# Patient Record
Sex: FEMALE | Race: WHITE | Employment: FULL TIME | URBAN - METROPOLITAN AREA
[De-identification: names, ages, dates, MRNs, and addresses within clinical notes are randomized per-mention and may not be internally consistent; named-entity substitution may affect disease eponyms.]

---

## 2017-01-10 DIAGNOSIS — I10 ESSENTIAL HYPERTENSION: ICD-10-CM

## 2017-01-10 RX ORDER — LISINOPRIL AND HYDROCHLOROTHIAZIDE 12.5; 1 MG/1; MG/1
1 TABLET ORAL DAILY
Qty: 90 TABLET | Refills: 1 | OUTPATIENT
Start: 2017-01-10

## 2017-03-21 DIAGNOSIS — E78.1 HYPERTRIGLYCERIDEMIA: ICD-10-CM

## 2017-03-21 RX ORDER — FENOFIBRATE 145 MG/1
145 TABLET, COATED ORAL DAILY
Qty: 90 TABLET | Refills: 1 | OUTPATIENT
Start: 2017-03-21

## 2017-04-03 ENCOUNTER — TELEPHONE (OUTPATIENT)
Dept: INTERNAL MEDICINE CLINIC | Age: 47
End: 2017-04-03

## 2017-04-04 ENCOUNTER — OFFICE VISIT (OUTPATIENT)
Dept: INTERNAL MEDICINE CLINIC | Age: 47
End: 2017-04-04

## 2017-04-04 VITALS
SYSTOLIC BLOOD PRESSURE: 110 MMHG | RESPIRATION RATE: 12 BRPM | WEIGHT: 230.6 LBS | BODY MASS INDEX: 42.44 KG/M2 | DIASTOLIC BLOOD PRESSURE: 72 MMHG | TEMPERATURE: 98.1 F | HEART RATE: 75 BPM | OXYGEN SATURATION: 99 % | HEIGHT: 62 IN

## 2017-04-04 DIAGNOSIS — I10 ESSENTIAL HYPERTENSION: Primary | ICD-10-CM

## 2017-04-04 DIAGNOSIS — E78.1 HYPERTRIGLYCERIDEMIA: ICD-10-CM

## 2017-04-04 DIAGNOSIS — Z00.00 ANNUAL PHYSICAL EXAM: ICD-10-CM

## 2017-04-04 DIAGNOSIS — H93.8X2 EAR FULLNESS, LEFT: ICD-10-CM

## 2017-04-04 DIAGNOSIS — R73.03 PRE-DIABETES: ICD-10-CM

## 2017-04-04 DIAGNOSIS — K21.9 GASTROESOPHAGEAL REFLUX DISEASE, ESOPHAGITIS PRESENCE NOT SPECIFIED: ICD-10-CM

## 2017-04-04 DIAGNOSIS — H61.21 EXCESSIVE CERUMEN IN RIGHT EAR CANAL: ICD-10-CM

## 2017-04-04 LAB
CHOLESTEROL, TOTAL: 166 MG/DL (ref 0–199)
HDLC SERPL-MCNC: 28 MG/DL (ref 40–60)
LDL CHOLESTEROL CALCULATED: 97 MG/DL
TRIGL SERPL-MCNC: 204 MG/DL (ref 0–150)
TSH SERPL DL<=0.05 MIU/L-ACNC: 2.19 UIU/ML (ref 0.27–4.2)
VLDLC SERPL CALC-MCNC: 41 MG/DL

## 2017-04-04 PROCEDURE — 99214 OFFICE O/P EST MOD 30 MIN: CPT | Performed by: INTERNAL MEDICINE

## 2017-04-06 ASSESSMENT — ENCOUNTER SYMPTOMS
COUGH: 0
CHEST TIGHTNESS: 0
WHEEZING: 0
ABDOMINAL PAIN: 0
SHORTNESS OF BREATH: 0
SINUS PRESSURE: 0
RHINORRHEA: 0
CONSTIPATION: 0
DIARRHEA: 0
NAUSEA: 0
VOMITING: 0
SORE THROAT: 0
BLOOD IN STOOL: 0

## 2017-07-02 DIAGNOSIS — I10 ESSENTIAL HYPERTENSION: ICD-10-CM

## 2017-07-03 RX ORDER — LISINOPRIL AND HYDROCHLOROTHIAZIDE 12.5; 1 MG/1; MG/1
TABLET ORAL
Qty: 90 TABLET | Refills: 0 | Status: SHIPPED | OUTPATIENT
Start: 2017-07-03 | End: 2017-10-09 | Stop reason: SDUPTHER

## 2017-09-26 DIAGNOSIS — E78.1 HYPERTRIGLYCERIDEMIA: ICD-10-CM

## 2017-09-26 RX ORDER — FENOFIBRATE 145 MG/1
TABLET, COATED ORAL
Qty: 90 TABLET | Refills: 0 | Status: SHIPPED | OUTPATIENT
Start: 2017-09-26 | End: 2017-11-09 | Stop reason: SDUPTHER

## 2017-10-09 DIAGNOSIS — I10 ESSENTIAL HYPERTENSION: ICD-10-CM

## 2017-10-09 RX ORDER — LISINOPRIL AND HYDROCHLOROTHIAZIDE 12.5; 1 MG/1; MG/1
TABLET ORAL
Qty: 90 TABLET | Refills: 0 | Status: SHIPPED | OUTPATIENT
Start: 2017-10-09 | End: 2017-11-09 | Stop reason: SDUPTHER

## 2017-11-09 ENCOUNTER — OFFICE VISIT (OUTPATIENT)
Dept: INTERNAL MEDICINE CLINIC | Age: 47
End: 2017-11-09

## 2017-11-09 VITALS
DIASTOLIC BLOOD PRESSURE: 74 MMHG | BODY MASS INDEX: 40.3 KG/M2 | TEMPERATURE: 98 F | HEIGHT: 62 IN | WEIGHT: 219 LBS | HEART RATE: 86 BPM | SYSTOLIC BLOOD PRESSURE: 114 MMHG | RESPIRATION RATE: 12 BRPM | OXYGEN SATURATION: 98 %

## 2017-11-09 DIAGNOSIS — R73.03 PREDIABETES: ICD-10-CM

## 2017-11-09 DIAGNOSIS — F41.9 ANXIETY: ICD-10-CM

## 2017-11-09 DIAGNOSIS — E78.2 MIXED HYPERLIPIDEMIA: ICD-10-CM

## 2017-11-09 DIAGNOSIS — R82.998 LEUKOCYTES IN URINE: ICD-10-CM

## 2017-11-09 DIAGNOSIS — Z00.00 ANNUAL PHYSICAL EXAM: Primary | ICD-10-CM

## 2017-11-09 DIAGNOSIS — Z12.31 ENCOUNTER FOR SCREENING MAMMOGRAM FOR BREAST CANCER: ICD-10-CM

## 2017-11-09 DIAGNOSIS — Z23 NEED FOR INFLUENZA VACCINATION: ICD-10-CM

## 2017-11-09 DIAGNOSIS — I10 ESSENTIAL HYPERTENSION: ICD-10-CM

## 2017-11-09 LAB
BILIRUBIN, POC: NORMAL
BLOOD URINE, POC: NORMAL
CLARITY, POC: NORMAL
COLOR, POC: NORMAL
GLUCOSE URINE, POC: NORMAL
KETONES, POC: NORMAL
LEUKOCYTE EST, POC: NORMAL
NITRITE, POC: NORMAL
PH, POC: 7
PROTEIN, POC: NORMAL
SPECIFIC GRAVITY, POC: 1.01
UROBILINOGEN, POC: 0.2

## 2017-11-09 PROCEDURE — 90471 IMMUNIZATION ADMIN: CPT | Performed by: INTERNAL MEDICINE

## 2017-11-09 PROCEDURE — 99396 PREV VISIT EST AGE 40-64: CPT | Performed by: INTERNAL MEDICINE

## 2017-11-09 PROCEDURE — 90630 INFLUENZA, QUADV, 18-64 YRS, ID, PF, MICRO INJ, 0.1ML (FLUZONE QUADV, PF): CPT | Performed by: INTERNAL MEDICINE

## 2017-11-09 PROCEDURE — 81002 URINALYSIS NONAUTO W/O SCOPE: CPT | Performed by: INTERNAL MEDICINE

## 2017-11-09 RX ORDER — FENOFIBRATE 145 MG/1
TABLET, COATED ORAL
Qty: 90 TABLET | Refills: 1 | Status: SHIPPED | OUTPATIENT
Start: 2017-11-09 | End: 2018-05-10 | Stop reason: SDUPTHER

## 2017-11-09 RX ORDER — SERTRALINE HYDROCHLORIDE 25 MG/1
25 TABLET, FILM COATED ORAL DAILY
Qty: 30 TABLET | Refills: 1 | Status: SHIPPED | OUTPATIENT
Start: 2017-11-09 | End: 2017-12-11 | Stop reason: SDUPTHER

## 2017-11-09 RX ORDER — LISINOPRIL AND HYDROCHLOROTHIAZIDE 12.5; 1 MG/1; MG/1
TABLET ORAL
Qty: 90 TABLET | Refills: 1 | Status: SHIPPED | OUTPATIENT
Start: 2017-11-09 | End: 2018-05-10 | Stop reason: SDUPTHER

## 2017-11-09 ASSESSMENT — ENCOUNTER SYMPTOMS
DIARRHEA: 0
NAUSEA: 0
VOICE CHANGE: 0
PHOTOPHOBIA: 0
BACK PAIN: 1
VOMITING: 0
APNEA: 0
WHEEZING: 0
EYE PAIN: 0
CHEST TIGHTNESS: 0
SINUS PRESSURE: 0
CONSTIPATION: 0
SORE THROAT: 0
EYE ITCHING: 0
RECTAL PAIN: 0
FACIAL SWELLING: 0
COUGH: 0
RHINORRHEA: 0
ABDOMINAL DISTENTION: 0
TROUBLE SWALLOWING: 0
SHORTNESS OF BREATH: 0
ABDOMINAL PAIN: 0
ANAL BLEEDING: 0
EYE REDNESS: 0
BLOOD IN STOOL: 0
EYE DISCHARGE: 0
CHOKING: 1

## 2017-11-09 ASSESSMENT — PATIENT HEALTH QUESTIONNAIRE - PHQ9
2. FEELING DOWN, DEPRESSED OR HOPELESS: 0
SUM OF ALL RESPONSES TO PHQ9 QUESTIONS 1 & 2: 0
1. LITTLE INTEREST OR PLEASURE IN DOING THINGS: 0
SUM OF ALL RESPONSES TO PHQ QUESTIONS 1-9: 0

## 2017-11-09 NOTE — PROGRESS NOTES
Mackenzie Dupree   YOB: 1970    Date of Visit:  11/9/2017    Chief Complaint   Patient presents with    Annual Exam       HPI  Pt presents for annual physical exam.    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 Negative for appetite change, chills, diaphoresis, fatigue, fever and unexpected weight change. HENT: Negative for congestion, ear discharge, ear pain, facial swelling, hearing loss, mouth sores, nosebleeds, postnasal drip, rhinorrhea, sinus pressure, sneezing, sore throat, tinnitus, trouble swallowing and voice change. Eyes: Negative for photophobia, pain, discharge, redness, itching and visual disturbance. Respiratory: Positive for choking (with drinking fluids couple of times in years). Negative for apnea, cough, chest tightness, shortness of breath and wheezing. Cardiovascular: Negative for chest pain, palpitations and leg swelling. Gastrointestinal: Negative for abdominal distention, abdominal pain, anal bleeding, blood in stool, constipation, diarrhea, nausea, rectal pain and vomiting. Endocrine: Negative for cold intolerance and heat intolerance.    Genitourinary: Negative for decreased urine volume, difficulty urinating, dysuria, flank pain, frequency, genital sores, hematuria, pelvic pain, urgency, vaginal bleeding, vaginal discharge and vaginal pain. LMP 2013 s/p hysterectomy   Musculoskeletal: Positive for arthralgias (patient is s/p a Podiatrist for left foot pain) and back pain (pt states she sees a Chiropractor). Negative for gait problem, joint swelling, myalgias, neck pain and neck stiffness. Skin: Negative for rash and wound. Neurological: Positive for dizziness (episode 1 month or so ago for less than 30 seconds). Negative for tremors, seizures, syncope, facial asymmetry, speech difficulty, weakness, light-headedness, numbness and headaches. Hematological: Negative for adenopathy. Does not bruise/bleed easily. Psychiatric/Behavioral: Negative for decreased concentration, dysphoric mood and sleep disturbance. The patient is not nervous/anxious. All other systems reviewed and are negative. Past Medical History:   Diagnosis Date    Endometriosis     Hypertension        Past Surgical History:   Procedure Laterality Date    [facility]  2004   [facility]    COLONOSCOPY  03/18/2015    [facility]OME[facility]    FOOT SURGERY Left 2013    left foot recontstructon    HYSTERECTOMY  2013    complete - vaginal hyst    TUBAL LIGATION  2013    with ablation       Outpatient Prescriptions Marked as Taking for the 11/9/17 encounter (Office Visit) with Atilano Closs, MD   Medication Sig Dispense Refill    lisinopril-hydrochlorothiazide (PRINZIDE;ZESTORETIC) 10-12.5 MG per tablet TAKE 1 TABLET BY MOUTH DAILY 90 tablet 0    fenofibrate (TRICOR) 145 MG tablet TAKE 1 TABLET BY MOUTH DAILY 90 tablet 0    Multiple Vitamins-Minerals (THERAPEUTIC MULTIVITAMIN-MINERALS) tablet Take 1 tablet by mouth daily      ibuprofen (ADVIL;MOTRIN) 200 MG tablet Take 400 mg by mouth every 6 hours as needed for Pain      omeprazole (PRILOSEC) 20 MG capsule Take 1 capsule by mouth Daily 90 capsule 1    fluticasone (FLONASE) 50 MCG/ACT nasal spray 2 sprays by Nasal route daily.  1 Bottle 3         Allergies   Allergen Reactions    Sulfa Antibiotics Rash       Social History   Substance Use Topics    Smoking status: Never Smoker    Smokeless tobacco: Never Used    Alcohol use 0.5 oz/week     1 Standard drinks or equivalent per week      Comment: social       Family History   Problem Relation Age of Onset    Diabetes Mother     High Blood Pressure Mother     Stroke Mother     High Blood Pressure Father     Cancer Maternal Aunt 58     breast    Cancer Maternal Grandmother 72     colon cancer    Cancer Paternal Grandmother      lung       Immunization History   Administered Date(s) Administered    Hepatitis B (Recombivax HB) 07/01/2015, 08/12/2015    Influenza, Intradermal, Quadrivalent, Preservative Free 10/27/2016, 11/09/2017    Tdap (Boostrix, Adacel) 09/24/2012       Vitals:    11/09/17 1656   BP: 114/74   Site: Right Arm   Position: Sitting   Cuff Size: Large Adult   Pulse: 86   Resp: 12   Temp: 98 °F (36.7 °C)   TempSrc: Oral   SpO2: 98%   Weight: 219 lb (99.3 kg)   Height: 5' 1.5\" (1.562 m)     Body mass index is 40.71 kg/m². Physical Exam   Constitutional: She is oriented to person, place, and time. She appears well-developed and well-nourished. No distress. Obese middle aged WF   HENT:   Head: Normocephalic and atraumatic. Right Ear: Hearing, tympanic membrane and ear canal normal.   Left Ear: Hearing, tympanic membrane and ear canal normal.   Nose: Nose normal.   Mouth/Throat: Uvula is midline, oropharynx is clear and moist and mucous membranes are normal.   Eyes: Conjunctivae, EOM and lids are normal. Pupils are equal, round, and reactive to light. Neck: Neck supple. Carotid bruit is not present. No thyromegaly present. Cardiovascular: Normal rate, regular rhythm, S1 normal, S2 normal, normal heart sounds and intact distal pulses. Exam reveals no gallop and no friction rub. No murmur heard.   Pulmonary/Chest: Effort normal and breath sounds normal. No exhibits no tenderness. Left lower leg: She exhibits no tenderness. Right foot: There is no tenderness and no swelling. Left foot: There is no tenderness and no swelling. Lymphadenopathy:        Head (right side): No submandibular adenopathy present. Head (left side): No submandibular adenopathy present. She has no cervical adenopathy. She has no axillary adenopathy. Neurological: She is alert and oriented to person, place, and time. She has normal strength and normal reflexes. No cranial nerve deficit. Gait normal.   Skin: Skin is warm, dry and intact. No bruising, no lesion and no rash noted. No erythema. Psychiatric: She has a normal mood and affect. Her speech is normal.   Nursing note reviewed. Assessment/Plan     1. Annual physical exam    - POCT Urinalysis no Micro  - Lipid Panel; Future  - CBC Auto Differential; Future  - Comprehensive Metabolic Panel; Future  - TSH without Reflex; Future    2. Essential hypertension    - lisinopril-hydrochlorothiazide (PRINZIDE;ZESTORETIC) 10-12.5 MG per tablet; TAKE 1 TABLET BY MOUTH DAILY  Dispense: 90 tablet; Refill: 1    3. Mixed hyperlipidemia    - Lipid Panel; Future  - fenofibrate (TRICOR) 145 MG tablet; TAKE 1 TABLET BY MOUTH DAILY  Dispense: 90 tablet; Refill: 1    4. Prediabetes    - Hemoglobin A1C; Future    5. Anxiety    - sertraline (ZOLOFT) 25 MG tablet; Take 1 tablet by mouth daily  Dispense: 30 tablet; Refill: 1    6. Need for influenza vaccination    - INFLUENZA, QUADV, 18-64 YRS, ID, PF, MICRO INJ, 0.1ML (FLUZONE QUADV, PF)    7. Leukocytes in urine    - Urine Culture; Future  - Urine Culture    8. Encounter for screening mammogram for breast cancer    - [facility] Screening Bilateral; Future      Discussed medications with patient, who voiced understanding of their use and indications. All questions answered. Return in about 4 weeks (around 12/7/2017) for anxiety.

## 2017-11-09 NOTE — PROGRESS NOTES
Vaccine Information Sheet, \"Influenza - Inactivated\"  given to Adis Prince, or parent/legal guardian of  Adis Prince and verbalized understanding. Patient responses:    Have you ever had a reaction to a flu vaccine? No  Are you able to eat eggs without adverse effects? Yes  Do you have any current illness? No  Have you ever had Guillian Boone Syndrome? No    Flu vaccine given per order. Please see immunization tab.

## 2017-11-09 NOTE — PATIENT INSTRUCTIONS
been estimated at 1 or 2 additional cases per million people vaccinated. This is much lower than the risk of severe complications from flu, which can be prevented by flu vaccine. · [facility] children who get the flu shot along with pneumococcal vaccine (PCV13) and/or DTaP vaccine at the same time might be slightly more likely to have a seizure caused by fever. Ask your doctor for more information. Tell your doctor if a child who is getting flu vaccine has ever had a seizure  Problems that could happen after any injected vaccine:  · People sometimes faint after a medical procedure, including vaccination. Sitting or lying down for about 15 minutes can help prevent fainting, and injuries caused by a fall. Tell your doctor if you feel dizzy, or have vision changes or ringing in the ears. · Some people get severe pain in the shoulder and have difficulty moving the arm where a shot was given. This happens very rarely. · Any medication can cause a severe allergic reaction. Such reactions from a vaccine are very rare, estimated at about 1 in a million doses, and would happen within a few minutes to a few hours after the vaccination. As with any medicine, there is a very remote chance of a vaccine causing a serious injury or death. The safety of vaccines is always being monitored. For more information, visit: www.cdc.gov/vaccinesafety/. What if there is a serious reaction? What should I look for? · Look for anything that concerns you, such as signs of a severe allergic reaction, very high fever, or unusual behavior. Signs of a severe allergic reaction can include hives, swelling of the face and throat, difficulty breathing, a fast heartbeat, dizziness, and weakness  usually within a few minutes to a few hours after the vaccination. What should I do?   · If you think it is a severe allergic reaction or other emergency that can't wait, call 9-1-1 and get the person to the nearest hospital. Otherwise, call your symptoms such as sweaty palms and a nervous feeling. In an anxiety disorder, the symptoms are far more severe. Constant worry, muscle tension, trouble sleeping, nausea and diarrhea, and other symptoms can make normal daily activities difficult or impossible. These symptoms may occur for no reason, and they can affect your work, school, or social life. Medicines, counseling, and self-care can all help. Follow-up care is a key part of your treatment and safety. Be sure to make and go to all appointments, and call your doctor if you are having problems. It's also a good idea to know your test results and keep a list of the medicines you take. How can you care for yourself at home? · Take medicines exactly as directed. Call your doctor if you think you are having a problem with your medicine. · Go to your counseling sessions and follow-up appointments. · Recognize and accept your anxiety. Then, when you are in a situation that makes you anxious, say to yourself, \"This is not an emergency. I feel uncomfortable, but I am not in danger. I can keep going even if I feel anxious. \"  · Be kind to your body:  ¨ Relieve tension with exercise or a massage. ¨ Get enough rest.  ¨ Avoid alcohol, caffeine, nicotine, and illegal drugs. They can increase your anxiety level and cause sleep problems. ¨ Learn and do relaxation techniques. See below for more about these techniques. · Engage your mind. Get out and do something you enjoy. Go to a funny movie, or take a walk or hike. Plan your day. Having too much or too little to do can make you anxious. · Keep a record of your symptoms. Discuss your fears with a good friend or family member, or join a support group for people with similar problems. Talking to others sometimes relieves stress. · Get involved in social groups, or volunteer to help others. Being alone sometimes makes things seem worse than they are.   · Get at least 30 minutes of exercise on most days of the week to relieve stress. Walking is a good choice. You also may want to do other activities, such as running, swimming, cycling, or playing tennis or team sports. Relaxation techniques  Do relaxation exercises 10 to 20 minutes a day. You can play soothing, relaxing music while you do them, if you wish. · Tell others in your house that you are going to do your relaxation exercises. Ask them not to disturb you. · Find a comfortable place, away from all distractions and noise. · Lie down on your back, or sit with your back straight. · Focus on your breathing. Make it slow and steady. · Breathe in through your nose. Breathe out through either your nose or mouth. · Breathe deeply, filling up the area between your navel and your rib cage. Breathe so that your belly goes up and down. · Do not hold your breath. · Breathe like this for 5 to 10 minutes. Notice the feeling of calmness throughout your whole body. As you continue to breathe slowly and deeply, relax by doing the following for another 5 to 10 minutes:  · Tighten and relax each muscle group in your body. You can begin at your toes and work your way up to your head. · Imagine your muscle groups relaxing and becoming heavy. · Empty your mind of all thoughts. · Let yourself relax more and more deeply. · Become aware of the state of calmness that surrounds you. · When your relaxation time is over, you can bring yourself back to alertness by moving your fingers and toes and then your hands and feet and then stretching and moving your entire body. Sometimes people fall asleep during relaxation, but they usually wake up shortly afterward. · Always give yourself time to return to full alertness before you drive a car or do anything that might cause an accident if you are not fully alert. Never play a relaxation tape while you drive a car. When should you call for help? Call 911 anytime you think you may need emergency care.  For example, call if:  · You feel you cannot stop from hurting yourself or someone else. Keep the numbers for these national suicide hotlines: 8-936-440-TALK (7-596.877.9849) and 5-408-NRTGDID (0-570.989.1315). If you or someone you know talks about suicide or feeling hopeless, get help right away. Watch closely for changes in your health, and be sure to contact your doctor if:  · You have anxiety or fear that affects your life. · You have symptoms of anxiety that are new or different from those you had before. Where can you learn more? Go to https://One World Virtualpepiceweb.MobilePaks. org and sign in to your [employer] account. Enter P754 in the [employer] box to learn more about \"Anxiety Disorder: Care Instructions. \"     If you do not have an account, please click on the \"Sign Up Now\" link. Current as of: July 26, 2016  Content Version: 11.3  © 0225-9776 [employer]. Care instructions adapted under license by [facility] etouches [facility] ([facility]). If you have questions about a medical condition or this instruction, always ask your healthcare professional. [place] [address] any warranty or liability for your use of this information. Patient Education        Well Visit, Ages 25 to 48: Care Instructions  Your Care Instructions  Physical exams can help you stay healthy. Your doctor has checked your overall health and may have suggested ways to take good care of yourself. He or she also may have recommended tests. At home, you can help prevent illness with healthy eating, regular exercise, and other steps. Follow-up care is a key part of your treatment and safety. Be sure to make and go to all appointments, and call your doctor if you are having problems. It's also a good idea to know your test results and keep a list of the medicines you take. How can you care for yourself at home? · Reach and stay at a healthy weight.  This will lower your risk for many problems, such as obesity, diabetes, heart disease, and high blood factors. · Vision. Talk with your doctor about how often to have a glaucoma test.  · Diabetes. Ask your doctor whether you should have tests for diabetes. · Colon cancer. Have a test for colon cancer at age 48. You may have one of several tests. If you are younger than 48, you may need a test earlier if you have any risk factors. Risk factors include whether you already had a precancerous polyp removed from your colon or whether your parent, brother, sister, or child has had colon cancer. For women  · Breast exam and mammogram. Talk to your doctor about when you should have a clinical breast exam and a mammogram. Medical experts differ on whether and how often women under 50 should have these tests. Your doctor can help you decide what is right for you. · Pap test and pelvic exam. Begin Pap tests at age 24. A Pap test is the best way to find cervical cancer. The test often is part of a pelvic exam. Ask how often to have this test.  · Tests for sexually transmitted infections (STIs). Ask whether you should have tests for STIs. You may be at risk if you have sex with more than one person, especially if your partners do not wear condoms. For men  · Tests for sexually transmitted infections (STIs). Ask whether you should have tests for STIs. You may be at risk if you have sex with more than one person, especially if you do not wear a condom. · Testicular cancer exam. Ask your doctor whether you should check your testicles regularly. · Prostate exam. Talk to your doctor about whether you should have a blood test (called a PSA test) for prostate cancer. Experts differ on whether and when men should have this test. Some experts suggest it if you are older than 39 and are -American or have a father or brother who got prostate cancer when he was younger than 72. When should you call for help?   Watch closely for changes in your health, and be sure to contact your doctor if you have any problems or symptoms that concern you. Where can you learn more? Go to https://chpepiceweb.healthResQU. org and sign in to your Mobi account. Enter P072 in the [facility] box to learn more about \"Well Visit, Ages 25 to 48: Care Instructions. \"     If you do not have an account, please click on the \"Sign Up Now\" link. Current as of: July 19, 2016  Content Version: 11.3  © 4100-2560 [employer]. Care instructions adapted under license by [facility] ([facility]). If you have questions about a medical condition or this instruction, always ask your healthcare professional. [address] any warranty or liability for your use of this information.

## 2017-11-11 LAB — URINE CULTURE, ROUTINE: NORMAL

## 2017-12-11 ENCOUNTER — OFFICE VISIT (OUTPATIENT)
Dept: INTERNAL MEDICINE CLINIC | Age: 47
End: 2017-12-11

## 2017-12-11 VITALS
HEIGHT: 62 IN | OXYGEN SATURATION: 98 % | DIASTOLIC BLOOD PRESSURE: 78 MMHG | BODY MASS INDEX: 40.45 KG/M2 | WEIGHT: 219.8 LBS | HEART RATE: 86 BPM | RESPIRATION RATE: 12 BRPM | TEMPERATURE: 98.5 F | SYSTOLIC BLOOD PRESSURE: 122 MMHG

## 2017-12-11 DIAGNOSIS — F41.9 ANXIETY: Primary | ICD-10-CM

## 2017-12-11 PROCEDURE — 99213 OFFICE O/P EST LOW 20 MIN: CPT | Performed by: INTERNAL MEDICINE

## 2017-12-11 RX ORDER — NAPROXEN 500 MG/1
500 TABLET ORAL 2 TIMES DAILY WITH MEALS
COMMUNITY
End: 2019-06-13

## 2017-12-11 RX ORDER — SERTRALINE HYDROCHLORIDE 25 MG/1
25 TABLET, FILM COATED ORAL DAILY
Qty: 90 TABLET | Refills: 0 | Status: SHIPPED | OUTPATIENT
Start: 2017-12-11 | End: 2018-04-10 | Stop reason: SDUPTHER

## 2017-12-11 ASSESSMENT — ENCOUNTER SYMPTOMS
CHEST TIGHTNESS: 0
VOMITING: 0
SHORTNESS OF BREATH: 0
NAUSEA: 0

## 2017-12-11 ASSESSMENT — PATIENT HEALTH QUESTIONNAIRE - PHQ9
SUM OF ALL RESPONSES TO PHQ QUESTIONS 1-9: 0
1. LITTLE INTEREST OR PLEASURE IN DOING THINGS: 0
2. FEELING DOWN, DEPRESSED OR HOPELESS: 0
SUM OF ALL RESPONSES TO PHQ9 QUESTIONS 1 & 2: 0

## 2017-12-11 NOTE — PROGRESS NOTES
BP: 122/78   Pulse: 86   Resp: 12   Temp: 98.5 °F (36.9 °C)   SpO2: 98%   Weight: 219 lb 12.8 oz (99.7 kg)   Height: 5' 1.5\" (1.562 m)     Body mass index is 40.86 kg/m². Physical Exam   Constitutional: She appears well-developed and well-nourished. No distress. Obese middle aged WF   HENT:   Mouth/Throat: Oropharynx is clear and moist.   Eyes: EOM are normal. Pupils are equal, round, and reactive to light. Neck: Neck supple. No thyromegaly present. Cardiovascular: Normal rate, regular rhythm and normal heart sounds. No murmur heard. Pulmonary/Chest: Effort normal and breath sounds normal. No respiratory distress. Psychiatric: She has a normal mood and affect. Nursing note reviewed. Assessment/Plan     1. Anxiety  -stable  - Continue sertraline (ZOLOFT) 25 MG tablet; Take 1 tablet by mouth daily        Discussed medications with patient, who voiced understanding of their use and indications. All questions answered. Return in about 5 months (around 5/11/2018) for hypertension, anxiety, hypertriglyceridemia, and GERD.

## 2018-02-01 DIAGNOSIS — F41.9 ANXIETY: ICD-10-CM

## 2018-02-01 RX ORDER — SERTRALINE HYDROCHLORIDE 25 MG/1
25 TABLET, FILM COATED ORAL DAILY
Qty: 90 TABLET | Refills: 1 | Status: CANCELLED | OUTPATIENT
Start: 2018-02-01

## 2018-02-05 ENCOUNTER — OFFICE VISIT (OUTPATIENT)
Dept: INTERNAL MEDICINE CLINIC | Age: 48
End: 2018-02-05

## 2018-02-05 VITALS
RESPIRATION RATE: 14 BRPM | HEIGHT: 62 IN | OXYGEN SATURATION: 97 % | SYSTOLIC BLOOD PRESSURE: 110 MMHG | HEART RATE: 89 BPM | BODY MASS INDEX: 40.63 KG/M2 | WEIGHT: 220.8 LBS | DIASTOLIC BLOOD PRESSURE: 70 MMHG | TEMPERATURE: 98 F

## 2018-02-05 DIAGNOSIS — R00.2 PALPITATIONS: ICD-10-CM

## 2018-02-05 DIAGNOSIS — R06.02 SHORTNESS OF BREATH: Primary | ICD-10-CM

## 2018-02-05 DIAGNOSIS — R06.02 SHORTNESS OF BREATH: ICD-10-CM

## 2018-02-05 LAB
BASOPHILS ABSOLUTE: 0.1 K/UL (ref 0–0.2)
BASOPHILS RELATIVE PERCENT: 0.6 %
EOSINOPHILS ABSOLUTE: 0.2 K/UL (ref 0–0.6)
EOSINOPHILS RELATIVE PERCENT: 1.8 %
HCT VFR BLD CALC: 37.4 % (ref 36–48)
HEMOGLOBIN: 12.3 G/DL (ref 12–16)
LYMPHOCYTES ABSOLUTE: 2.4 K/UL (ref 1–5.1)
LYMPHOCYTES RELATIVE PERCENT: 27.1 %
MCH RBC QN AUTO: 26.9 PG (ref 26–34)
MCHC RBC AUTO-ENTMCNC: 33 G/DL (ref 31–36)
MCV RBC AUTO: 81.5 FL (ref 80–100)
MONOCYTES ABSOLUTE: 0.6 K/UL (ref 0–1.3)
MONOCYTES RELATIVE PERCENT: 7.4 %
NEUTROPHILS ABSOLUTE: 5.5 K/UL (ref 1.7–7.7)
NEUTROPHILS RELATIVE PERCENT: 63.1 %
PDW BLD-RTO: 13.8 % (ref 12.4–15.4)
PLATELET # BLD: 363 K/UL (ref 135–450)
PMV BLD AUTO: 8.1 FL (ref 5–10.5)
RBC # BLD: 4.58 M/UL (ref 4–5.2)
WBC # BLD: 8.7 K/UL (ref 4–11)

## 2018-02-05 PROCEDURE — 99213 OFFICE O/P EST LOW 20 MIN: CPT | Performed by: INTERNAL MEDICINE

## 2018-02-05 PROCEDURE — 93000 ELECTROCARDIOGRAM COMPLETE: CPT | Performed by: INTERNAL MEDICINE

## 2018-02-05 NOTE — PATIENT INSTRUCTIONS
-Chest xray  -Echocardiogram  -Continue same medications  -wean caffeine  -avoid decongestants      Patient Education        Palpitations: Care Instructions  Your Care Instructions    Heart palpitations are the uncomfortable sensation that your heart is beating fast or irregularly. You might feel pounding or fluttering in your chest. It might feel like your heart is skipping a beat. Although palpitations may be caused by a heart problem, they also occur because of stress, fatigue, or use of alcohol, caffeine, or nicotine. Many medicines, including diet pills, antihistamines, decongestants, and some herbal products, can cause heart palpitations. Nearly everyone has palpitations from time to time. Depending on your symptoms, your doctor may need to do more tests to try to find the cause of your palpitations. Follow-up care is a key part of your treatment and safety. Be sure to make and go to all appointments, and call your doctor if you are having problems. It's also a good idea to know your test results and keep a list of the medicines you take. How can you care for yourself at home? · Avoid caffeine, nicotine, and excess alcohol. · Do not take illegal drugs, such as methamphetamines and cocaine. · Do not take weight loss or diet medicines unless you talk with your doctor first.  · Get plenty of sleep. · Do not overeat. · If you have palpitations again, take deep breaths and try to relax. This may slow a racing heart. · If you start to feel lightheaded, lie down to avoid injuries that might result if you pass out and fall down. · Keep a record of your palpitations and bring it to your next doctor's appointment. Write down:  ¨ The date and time. ¨ Your pulse. (If your heart is beating fast, it may be hard to count your pulse.)  ¨ What you were doing when the palpitations started. ¨ How long the palpitations lasted. ¨ Any other symptoms. · If an activity causes palpitations, slow down or stop.  Talk to your doctor before you do that activity again. · Take your medicines exactly as prescribed. Call your doctor if you think you are having a problem with your medicine. When should you call for help? Call 911 anytime you think you may need emergency care. For example, call if:  ? · You passed out (lost consciousness). ? · You have symptoms of a heart attack. These may include:  ¨ Chest pain or pressure, or a strange feeling in the chest.  ¨ Sweating. ¨ Shortness of breath. ¨ Pain, pressure, or a strange feeling in the back, neck, jaw, or upper belly or in one or both shoulders or arms. ¨ Lightheadedness or sudden weakness. ¨ A fast or irregular heartbeat. After you call 911, the  may tell you to chew 1 adult-strength or 2 to 4 low-dose aspirin. Wait for an ambulance. Do not try to drive yourself. ? · You have symptoms of a stroke. These may include:  ¨ Sudden numbness, tingling, weakness, or loss of movement in your face, arm, or leg, especially on only one side of your body. ¨ Sudden vision changes. ¨ Sudden trouble speaking. ¨ Sudden confusion or trouble understanding simple statements. ¨ Sudden problems with walking or balance. ¨ A sudden, severe headache that is different from past headaches. ?Call your doctor now or seek immediate medical care if:  ? · You have heart palpitations and:  ¨ Are dizzy or lightheaded, or you feel like you may faint. ¨ Have new or increased shortness of breath. ? Watch closely for changes in your health, and be sure to contact your doctor if:  ? · You continue to have heart palpitations. Where can you learn more? Go to [email or web address]://chperichareweb.[email or web address] org and sign in to your BusyEvent account. Enter R508 in the [employer] box to learn more about \"Palpitations: Care Instructions. \"     If you do not have an account, please click on the \"Sign Up Now\" link.   Current as of: September 21, 2016  Content Version: 11.5  © 6692-3410 Healthwise,

## 2018-02-06 LAB
ANION GAP SERPL CALCULATED.3IONS-SCNC: 15 MMOL/L (ref 3–16)
BUN BLDV-MCNC: 20 MG/DL (ref 7–20)
CALCIUM SERPL-MCNC: 10.5 MG/DL (ref 8.3–10.6)
CHLORIDE BLD-SCNC: 103 MMOL/L (ref 99–110)
CO2: 27 MMOL/L (ref 21–32)
CREAT SERPL-MCNC: 0.7 MG/DL (ref 0.6–1.1)
GFR AFRICAN AMERICAN: >60
GFR NON-AFRICAN AMERICAN: >60
GLUCOSE BLD-MCNC: 94 MG/DL (ref 70–99)
POTASSIUM SERPL-SCNC: 4.5 MMOL/L (ref 3.5–5.1)
SODIUM BLD-SCNC: 145 MMOL/L (ref 136–145)
TSH SERPL DL<=0.05 MIU/L-ACNC: 2.49 UIU/ML (ref 0.27–4.2)

## 2018-02-13 ENCOUNTER — HOSPITAL ENCOUNTER (OUTPATIENT)
Dept: NEUROLOGY | Age: 48
Discharge: HOME | End: 2018-02-13
Attending: INTERNAL MEDICINE | Admitting: INTERNAL MEDICINE

## 2018-02-13 DIAGNOSIS — R06.02 SHORTNESS OF BREATH: ICD-10-CM

## 2018-02-13 DIAGNOSIS — R00.2 PALPITATIONS: ICD-10-CM

## 2018-02-13 LAB
LEFT VENTRICULAR EJECTION FRACTION MODE: NORMAL
LV EF: 55 %
LV EF: 55 %
LVEF MODALITY: NORMAL

## 2018-02-15 LAB
ACQUISITION DURATION: NORMAL S
AVERAGE HEART RATE: 82 BPM
EKG DIAGNOSIS: NORMAL
HOLTER MAX HEART RATE: 123 BPM
HOOKUP DATE: NORMAL
HOOKUP TIME: NORMAL
Lab: NORMAL
MAX HEART RATE TIME/DATE: NORMAL
MIN HEART RATE TIME/DATE: NORMAL
MIN HEART RATE: 53 BPM
NUMBER OF QRS COMPLEXES: NORMAL
NUMBER OF SUPRAVENTRICULAR BEATS IN RUNS: 0
NUMBER OF SUPRAVENTRICULAR COUPLETS: 1
NUMBER OF SUPRAVENTRICULAR ECTOPICS: 2
NUMBER OF SUPRAVENTRICULAR ISOLATED BEATS: 0
NUMBER OF SUPRAVENTRICULAR RUNS: 0
NUMBER OF VENTRICULAR BEATS IN RUNS: 0
NUMBER OF VENTRICULAR BIGEMINAL CYCLES: 0
NUMBER OF VENTRICULAR COUPLETS: 0
NUMBER OF VENTRICULAR ECTOPICS: 1
NUMBER OF VENTRICULAR ISOLATED BEATS: 1
NUMBER OF VENTRICULAR RUNS: 0

## 2018-04-10 DIAGNOSIS — F41.9 ANXIETY: ICD-10-CM

## 2018-04-10 RX ORDER — SERTRALINE HYDROCHLORIDE 25 MG/1
25 TABLET, FILM COATED ORAL DAILY
Qty: 90 TABLET | Refills: 0 | Status: SHIPPED | OUTPATIENT
Start: 2018-04-10 | End: 2018-07-08 | Stop reason: SDUPTHER

## 2018-05-10 ENCOUNTER — OFFICE VISIT (OUTPATIENT)
Dept: INTERNAL MEDICINE CLINIC | Age: 48
End: 2018-05-10

## 2018-05-10 VITALS
HEIGHT: 62 IN | BODY MASS INDEX: 41.18 KG/M2 | DIASTOLIC BLOOD PRESSURE: 70 MMHG | OXYGEN SATURATION: 96 % | HEART RATE: 82 BPM | SYSTOLIC BLOOD PRESSURE: 122 MMHG | WEIGHT: 223.8 LBS

## 2018-05-10 DIAGNOSIS — K21.9 GASTROESOPHAGEAL REFLUX DISEASE, ESOPHAGITIS PRESENCE NOT SPECIFIED: ICD-10-CM

## 2018-05-10 DIAGNOSIS — L84 FOOT CALLUS: ICD-10-CM

## 2018-05-10 DIAGNOSIS — R73.03 PREDIABETES: ICD-10-CM

## 2018-05-10 DIAGNOSIS — F41.9 ANXIETY: ICD-10-CM

## 2018-05-10 DIAGNOSIS — E78.2 MIXED HYPERLIPIDEMIA: ICD-10-CM

## 2018-05-10 DIAGNOSIS — I10 ESSENTIAL HYPERTENSION: Primary | ICD-10-CM

## 2018-05-10 LAB — HBA1C MFR BLD: 5.9 %

## 2018-05-10 PROCEDURE — 83036 HEMOGLOBIN GLYCOSYLATED A1C: CPT | Performed by: INTERNAL MEDICINE

## 2018-05-10 PROCEDURE — 99214 OFFICE O/P EST MOD 30 MIN: CPT | Performed by: INTERNAL MEDICINE

## 2018-05-10 RX ORDER — FENOFIBRATE 145 MG/1
TABLET, COATED ORAL
Qty: 90 TABLET | Refills: 1 | Status: SHIPPED | OUTPATIENT
Start: 2018-05-10 | End: 2018-11-12 | Stop reason: SDUPTHER

## 2018-05-10 RX ORDER — LISINOPRIL AND HYDROCHLOROTHIAZIDE 12.5; 1 MG/1; MG/1
TABLET ORAL
Qty: 90 TABLET | Refills: 1 | Status: SHIPPED | OUTPATIENT
Start: 2018-05-10 | End: 2018-11-12 | Stop reason: SDUPTHER

## 2018-05-11 ASSESSMENT — ENCOUNTER SYMPTOMS
WHEEZING: 0
CHEST TIGHTNESS: 0
CONSTIPATION: 0
SINUS PRESSURE: 0
RHINORRHEA: 0
DIARRHEA: 0
ABDOMINAL PAIN: 0
SHORTNESS OF BREATH: 0
SORE THROAT: 0
BLOOD IN STOOL: 0
NAUSEA: 0
VOMITING: 0
COUGH: 0

## 2018-06-01 DIAGNOSIS — E78.2 MIXED HYPERLIPIDEMIA: ICD-10-CM

## 2018-06-01 DIAGNOSIS — I10 ESSENTIAL HYPERTENSION: ICD-10-CM

## 2018-06-01 LAB
A/G RATIO: 1.8 (ref 1.1–2.2)
ALBUMIN SERPL-MCNC: 4.6 G/DL (ref 3.4–5)
ALP BLD-CCNC: 71 U/L (ref 40–129)
ALT SERPL-CCNC: 52 U/L (ref 10–40)
ANION GAP SERPL CALCULATED.3IONS-SCNC: 14 MMOL/L (ref 3–16)
AST SERPL-CCNC: 32 U/L (ref 15–37)
BILIRUB SERPL-MCNC: <0.2 MG/DL (ref 0–1)
BUN BLDV-MCNC: 17 MG/DL (ref 7–20)
CALCIUM SERPL-MCNC: 10.2 MG/DL (ref 8.3–10.6)
CHLORIDE BLD-SCNC: 100 MMOL/L (ref 99–110)
CHOLESTEROL, TOTAL: 182 MG/DL (ref 0–199)
CO2: 26 MMOL/L (ref 21–32)
CREAT SERPL-MCNC: 0.6 MG/DL (ref 0.6–1.1)
GFR AFRICAN AMERICAN: >60
GFR NON-AFRICAN AMERICAN: >60
GLOBULIN: 2.5 G/DL
GLUCOSE BLD-MCNC: 93 MG/DL (ref 70–99)
HDLC SERPL-MCNC: 26 MG/DL (ref 40–60)
LDL CHOLESTEROL CALCULATED: 104 MG/DL
POTASSIUM SERPL-SCNC: 4.3 MMOL/L (ref 3.5–5.1)
SODIUM BLD-SCNC: 140 MMOL/L (ref 136–145)
TOTAL PROTEIN: 7.1 G/DL (ref 6.4–8.2)
TRIGL SERPL-MCNC: 261 MG/DL (ref 0–150)
VLDLC SERPL CALC-MCNC: 52 MG/DL

## 2018-07-08 DIAGNOSIS — F41.9 ANXIETY: ICD-10-CM

## 2018-07-09 RX ORDER — SERTRALINE HYDROCHLORIDE 25 MG/1
25 TABLET, FILM COATED ORAL DAILY
Qty: 90 TABLET | Refills: 0 | Status: SHIPPED | OUTPATIENT
Start: 2018-07-09 | End: 2018-10-05 | Stop reason: SDUPTHER

## 2018-10-05 DIAGNOSIS — F41.9 ANXIETY: ICD-10-CM

## 2018-10-05 RX ORDER — SERTRALINE HYDROCHLORIDE 25 MG/1
TABLET, FILM COATED ORAL
Qty: 90 TABLET | Refills: 0 | Status: SHIPPED | OUTPATIENT
Start: 2018-10-05 | End: 2019-01-02 | Stop reason: SDUPTHER

## 2018-11-12 ENCOUNTER — OFFICE VISIT (OUTPATIENT)
Dept: INTERNAL MEDICINE CLINIC | Age: 48
End: 2018-11-12
Payer: COMMERCIAL

## 2018-11-12 VITALS
TEMPERATURE: 98.8 F | BODY MASS INDEX: 41.72 KG/M2 | HEART RATE: 93 BPM | SYSTOLIC BLOOD PRESSURE: 112 MMHG | DIASTOLIC BLOOD PRESSURE: 70 MMHG | WEIGHT: 226.7 LBS | HEIGHT: 62 IN | RESPIRATION RATE: 14 BRPM | OXYGEN SATURATION: 97 %

## 2018-11-12 DIAGNOSIS — R82.998 LEUKOCYTES IN URINE: ICD-10-CM

## 2018-11-12 DIAGNOSIS — I10 ESSENTIAL HYPERTENSION: ICD-10-CM

## 2018-11-12 DIAGNOSIS — L84 CALLUS OF FOOT: ICD-10-CM

## 2018-11-12 DIAGNOSIS — K21.9 GASTROESOPHAGEAL REFLUX DISEASE, ESOPHAGITIS PRESENCE NOT SPECIFIED: ICD-10-CM

## 2018-11-12 DIAGNOSIS — F41.9 ANXIETY: ICD-10-CM

## 2018-11-12 DIAGNOSIS — R00.2 PALPITATIONS: ICD-10-CM

## 2018-11-12 DIAGNOSIS — Z23 NEED FOR INFLUENZA VACCINATION: ICD-10-CM

## 2018-11-12 DIAGNOSIS — R73.03 PREDIABETES: ICD-10-CM

## 2018-11-12 DIAGNOSIS — Z00.00 ANNUAL PHYSICAL EXAM: Primary | ICD-10-CM

## 2018-11-12 DIAGNOSIS — E78.2 MIXED HYPERLIPIDEMIA: ICD-10-CM

## 2018-11-12 DIAGNOSIS — Z12.31 ENCOUNTER FOR SCREENING MAMMOGRAM FOR BREAST CANCER: ICD-10-CM

## 2018-11-12 LAB
BILIRUBIN, POC: ABNORMAL
BLOOD URINE, POC: ABNORMAL
CLARITY, POC: CLEAR
COLOR, POC: YELLOW
GLUCOSE URINE, POC: ABNORMAL
KETONES, POC: ABNORMAL
LEUKOCYTE EST, POC: ABNORMAL
NITRITE, POC: ABNORMAL
PH, POC: 6
PROTEIN, POC: ABNORMAL
SPECIFIC GRAVITY, POC: 1.02
UROBILINOGEN, POC: 0.2

## 2018-11-12 PROCEDURE — 99396 PREV VISIT EST AGE 40-64: CPT | Performed by: INTERNAL MEDICINE

## 2018-11-12 PROCEDURE — 90686 IIV4 VACC NO PRSV 0.5 ML IM: CPT | Performed by: INTERNAL MEDICINE

## 2018-11-12 PROCEDURE — 90471 IMMUNIZATION ADMIN: CPT | Performed by: INTERNAL MEDICINE

## 2018-11-12 PROCEDURE — 81002 URINALYSIS NONAUTO W/O SCOPE: CPT | Performed by: INTERNAL MEDICINE

## 2018-11-12 RX ORDER — LISINOPRIL AND HYDROCHLOROTHIAZIDE 12.5; 1 MG/1; MG/1
TABLET ORAL
Qty: 90 TABLET | Refills: 1 | Status: SHIPPED | OUTPATIENT
Start: 2018-11-12 | End: 2019-07-04 | Stop reason: SDUPTHER

## 2018-11-12 RX ORDER — FENOFIBRATE 145 MG/1
TABLET, COATED ORAL
Qty: 90 TABLET | Refills: 1 | Status: SHIPPED | OUTPATIENT
Start: 2018-11-12 | End: 2019-07-04 | Stop reason: SDUPTHER

## 2018-11-12 ASSESSMENT — ENCOUNTER SYMPTOMS
PHOTOPHOBIA: 0
CONSTIPATION: 0
SORE THROAT: 0
VOMITING: 0
DIARRHEA: 0
TROUBLE SWALLOWING: 0
BACK PAIN: 0
COUGH: 0
EYE ITCHING: 0
RHINORRHEA: 0
ABDOMINAL DISTENTION: 0
CHOKING: 0
ANAL BLEEDING: 0
SINUS PRESSURE: 0
VOICE CHANGE: 0
ABDOMINAL PAIN: 0
EYE DISCHARGE: 0
CHEST TIGHTNESS: 0
APNEA: 0
NAUSEA: 0
BLOOD IN STOOL: 0
RECTAL PAIN: 0
WHEEZING: 0
FACIAL SWELLING: 0
EYE PAIN: 0
EYE REDNESS: 0
SHORTNESS OF BREATH: 0

## 2018-11-12 ASSESSMENT — PATIENT HEALTH QUESTIONNAIRE - PHQ9
1. LITTLE INTEREST OR PLEASURE IN DOING THINGS: 0
SUM OF ALL RESPONSES TO PHQ QUESTIONS 1-9: 0
SUM OF ALL RESPONSES TO PHQ9 QUESTIONS 1 & 2: 0
SUM OF ALL RESPONSES TO PHQ QUESTIONS 1-9: 0
2. FEELING DOWN, DEPRESSED OR HOPELESS: 0

## 2018-11-12 NOTE — PATIENT INSTRUCTIONS
cases per million people vaccinated. This is much lower than the risk of severe complications from flu, which can be prevented by flu vaccine. · [facility] children who get the flu shot along with pneumococcal vaccine (PCV13) and/or DTaP vaccine at the same time might be slightly more likely to have a seizure caused by fever. Ask your doctor for more information. Tell your doctor if a child who is getting flu vaccine has ever had a seizure  Problems that could happen after any injected vaccine:  · People sometimes faint after a medical procedure, including vaccination. Sitting or lying down for about 15 minutes can help prevent fainting, and injuries caused by a fall. Tell your doctor if you feel dizzy, or have vision changes or ringing in the ears. · Some people get severe pain in the shoulder and have difficulty moving the arm where a shot was given. This happens very rarely. · Any medication can cause a severe allergic reaction. Such reactions from a vaccine are very rare, estimated at about 1 in a million doses, and would happen within a few minutes to a few hours after the vaccination. As with any medicine, there is a very remote chance of a vaccine causing a serious injury or death. The safety of vaccines is always being monitored. For more information, visit: www.cdc.gov/vaccinesafety/. What if there is a serious reaction? What should I look for? · Look for anything that concerns you, such as signs of a severe allergic reaction, very high fever, or unusual behavior. Signs of a severe allergic reaction can include hives, swelling of the face and throat, difficulty breathing, a fast heartbeat, dizziness, and weakness - usually within a few minutes to a few hours after the vaccination. What should I do? · If you think it is a severe allergic reaction or other emergency that can't wait, call 9-1-1 and get the person to the nearest hospital. Otherwise, call your doctor.   · Reactions should be reported to the \"Vaccine Adverse Event Reporting System\" (VAERS). Your doctor should file this report, or you can do it yourself through the VAERS website at www.vaers. hhs.gov, or by calling 5-606.464.4871. VAERS does not give medical advice. The National Vaccine Injury Compensation Program  The National Vaccine Injury Compensation Program (VICP) is a federal program that was created to compensate people who may have been injured by certain vaccines. Persons who believe they may have been injured by a vaccine can learn about the program and about filing a claim by calling 6-995.536.2884 or visiting the [employer] website at www.[facility].gov/vaccinecompensation. There is a time limit to file a claim for compensation. How can I learn more? · Ask your healthcare provider. He or she can give you the vaccine package insert or suggest other sources of information. · Call your local or state health department. · Contact the Centers for Disease Control and Prevention (CDC):  ? Call 4-122.350.1690 (1-800-CDC-INFO) or  ? Visit CDC's website at www.cdc.gov/flu  Vaccine Information Statement  Inactivated Influenza Vaccine  8/7/2015)  42 JESSENIA [place] 647PB-32  Department of Health and Human Services  Centers for Disease Control and Prevention  Many Vaccine Information Statements are available in [place] and other languages. See www.immunize.org/vis. Muchas hojas de información sobre vacunas están disponibles en español y en otros idiomas. Visite www.immunize.org/vis. Care instructions adapted under license by [facility] ([facility]). If you have questions about a medical condition or this instruction, always ask your healthcare professional. [place] [address] any warranty or liability for your use of this information. Patient Education        Well Visit, Ages 25 to 48: Care Instructions  Your Care Instructions    Physical exams can help you stay healthy.  Your doctor has checked your overall health and may have suggested ways to take good

## 2018-11-12 NOTE — PROGRESS NOTES
PHQ Scores 11/12/2018 12/11/2017 11/9/2017 10/27/2016   PHQ2 Score 0 0 0 0   PHQ9 Score 0 0 0 0     Interpretation of Total Score Depression Severity: 1-4 = Minimal depression, 5-9 = Mild depression, 10-14 = Moderate depression, 15-19 = Moderately severe depression, 20-27 = Severe depression    Vaccine Information Sheet, \"Influenza - Inactivated\"  given to Luca Wolf, or parent/legal guardian of  Luca Wolf and verbalized understanding. Patient responses:    Have you ever had a reaction to a flu vaccine? No  Are you able to eat eggs without adverse effects? Yes  Do you have any current illness? No  Have you ever had Guillian [place]anna Syndrome? No    Flu vaccine given per order. Please see immunization tab.

## 2018-11-14 LAB — URINE CULTURE, ROUTINE: NORMAL

## 2019-01-02 DIAGNOSIS — F41.9 ANXIETY: ICD-10-CM

## 2019-01-02 RX ORDER — SERTRALINE HYDROCHLORIDE 25 MG/1
TABLET, FILM COATED ORAL
Qty: 90 TABLET | Refills: 1 | Status: SHIPPED | OUTPATIENT
Start: 2019-01-02 | End: 2019-06-30 | Stop reason: SDUPTHER

## 2019-02-26 ENCOUNTER — OFFICE VISIT (OUTPATIENT)
Dept: INTERNAL MEDICINE CLINIC | Age: 49
End: 2019-02-26
Payer: COMMERCIAL

## 2019-02-26 VITALS
HEART RATE: 95 BPM | TEMPERATURE: 98.4 F | WEIGHT: 228.2 LBS | SYSTOLIC BLOOD PRESSURE: 100 MMHG | DIASTOLIC BLOOD PRESSURE: 70 MMHG | HEIGHT: 62 IN | BODY MASS INDEX: 41.99 KG/M2 | OXYGEN SATURATION: 98 %

## 2019-02-26 DIAGNOSIS — R19.7 DIARRHEA, UNSPECIFIED TYPE: ICD-10-CM

## 2019-02-26 DIAGNOSIS — J06.9 UPPER RESPIRATORY TRACT INFECTION, UNSPECIFIED TYPE: Primary | ICD-10-CM

## 2019-02-26 DIAGNOSIS — R11.0 NAUSEA: ICD-10-CM

## 2019-02-26 DIAGNOSIS — R53.83 FATIGUE, UNSPECIFIED TYPE: ICD-10-CM

## 2019-02-26 PROCEDURE — 99214 OFFICE O/P EST MOD 30 MIN: CPT | Performed by: INTERNAL MEDICINE

## 2019-02-26 RX ORDER — CELECOXIB 200 MG/1
200 CAPSULE ORAL DAILY
COMMUNITY
End: 2019-06-13

## 2019-02-26 RX ORDER — BROMPHENIRAMINE MALEATE, PSEUDOEPHEDRINE HYDROCHLORIDE, AND DEXTROMETHORPHAN HYDROBROMIDE 2; 30; 10 MG/5ML; MG/5ML; MG/5ML
5 SYRUP ORAL 4 TIMES DAILY PRN
Qty: 120 ML | Refills: 0 | Status: SHIPPED | OUTPATIENT
Start: 2019-02-26 | End: 2019-06-13

## 2019-02-26 RX ORDER — AMOXICILLIN 875 MG/1
875 TABLET, COATED ORAL 2 TIMES DAILY
Qty: 20 TABLET | Refills: 0 | Status: SHIPPED | OUTPATIENT
Start: 2019-02-26 | End: 2019-03-08

## 2019-02-26 RX ORDER — ONDANSETRON 4 MG/1
4 TABLET, FILM COATED ORAL 3 TIMES DAILY PRN
Qty: 10 TABLET | Refills: 0 | Status: SHIPPED | OUTPATIENT
Start: 2019-02-26 | End: 2019-05-13

## 2019-02-26 ASSESSMENT — ENCOUNTER SYMPTOMS
DIARRHEA: 1
RHINORRHEA: 0
CHEST TIGHTNESS: 0
SINUS PAIN: 0
WHEEZING: 0
SORE THROAT: 1
ABDOMINAL PAIN: 0
SHORTNESS OF BREATH: 0
CONSTIPATION: 0
COUGH: 1
BLOOD IN STOOL: 0
SINUS PRESSURE: 1
NAUSEA: 1
VOMITING: 0

## 2019-02-26 ASSESSMENT — PATIENT HEALTH QUESTIONNAIRE - PHQ9
1. LITTLE INTEREST OR PLEASURE IN DOING THINGS: 0
SUM OF ALL RESPONSES TO PHQ9 QUESTIONS 1 & 2: 0
2. FEELING DOWN, DEPRESSED OR HOPELESS: 0
SUM OF ALL RESPONSES TO PHQ QUESTIONS 1-9: 0
SUM OF ALL RESPONSES TO PHQ QUESTIONS 1-9: 0

## 2019-02-27 DIAGNOSIS — Z00.00 ANNUAL PHYSICAL EXAM: ICD-10-CM

## 2019-02-27 DIAGNOSIS — R73.03 PREDIABETES: ICD-10-CM

## 2019-02-27 LAB
A/G RATIO: 2 (ref 1.1–2.2)
ALBUMIN SERPL-MCNC: 4.5 G/DL (ref 3.4–5)
ALP BLD-CCNC: 69 U/L (ref 40–129)
ALT SERPL-CCNC: 38 U/L (ref 10–40)
ANION GAP SERPL CALCULATED.3IONS-SCNC: 12 MMOL/L (ref 3–16)
AST SERPL-CCNC: 25 U/L (ref 15–37)
BASOPHILS ABSOLUTE: 0 K/UL (ref 0–0.2)
BASOPHILS RELATIVE PERCENT: 0.4 %
BILIRUB SERPL-MCNC: 0.3 MG/DL (ref 0–1)
BUN BLDV-MCNC: 14 MG/DL (ref 7–20)
CALCIUM SERPL-MCNC: 10.1 MG/DL (ref 8.3–10.6)
CHLORIDE BLD-SCNC: 99 MMOL/L (ref 99–110)
CO2: 28 MMOL/L (ref 21–32)
CREAT SERPL-MCNC: 0.6 MG/DL (ref 0.6–1.1)
EOSINOPHILS ABSOLUTE: 0.2 K/UL (ref 0–0.6)
EOSINOPHILS RELATIVE PERCENT: 2.8 %
GFR AFRICAN AMERICAN: >60
GFR NON-AFRICAN AMERICAN: >60
GLOBULIN: 2.3 G/DL
GLUCOSE BLD-MCNC: 92 MG/DL (ref 70–99)
HCT VFR BLD CALC: 36.2 % (ref 36–48)
HEMOGLOBIN: 12.3 G/DL (ref 12–16)
LYMPHOCYTES ABSOLUTE: 1.9 K/UL (ref 1–5.1)
LYMPHOCYTES RELATIVE PERCENT: 26.4 %
MCH RBC QN AUTO: 27.2 PG (ref 26–34)
MCHC RBC AUTO-ENTMCNC: 34 G/DL (ref 31–36)
MCV RBC AUTO: 80 FL (ref 80–100)
MONOCYTES ABSOLUTE: 0.6 K/UL (ref 0–1.3)
MONOCYTES RELATIVE PERCENT: 7.9 %
NEUTROPHILS ABSOLUTE: 4.6 K/UL (ref 1.7–7.7)
NEUTROPHILS RELATIVE PERCENT: 62.5 %
PDW BLD-RTO: 13.5 % (ref 12.4–15.4)
PLATELET # BLD: 362 K/UL (ref 135–450)
PMV BLD AUTO: 7.9 FL (ref 5–10.5)
POTASSIUM SERPL-SCNC: 3.7 MMOL/L (ref 3.5–5.1)
RBC # BLD: 4.53 M/UL (ref 4–5.2)
SODIUM BLD-SCNC: 139 MMOL/L (ref 136–145)
TOTAL PROTEIN: 6.8 G/DL (ref 6.4–8.2)
TSH SERPL DL<=0.05 MIU/L-ACNC: 1.44 UIU/ML (ref 0.27–4.2)
WBC # BLD: 7.3 K/UL (ref 4–11)

## 2019-02-28 LAB
ESTIMATED AVERAGE GLUCOSE: 125.5 MG/DL
HBA1C MFR BLD: 6 %

## 2019-05-13 ENCOUNTER — OFFICE VISIT (OUTPATIENT)
Dept: INTERNAL MEDICINE CLINIC | Age: 49
End: 2019-05-13
Payer: COMMERCIAL

## 2019-05-13 VITALS
WEIGHT: 238.8 LBS | BODY MASS INDEX: 43.94 KG/M2 | OXYGEN SATURATION: 97 % | TEMPERATURE: 97.8 F | HEART RATE: 103 BPM | HEIGHT: 62 IN

## 2019-05-13 DIAGNOSIS — J06.9 UPPER RESPIRATORY TRACT INFECTION, UNSPECIFIED TYPE: ICD-10-CM

## 2019-05-13 DIAGNOSIS — E78.2 MIXED HYPERLIPIDEMIA: ICD-10-CM

## 2019-05-13 DIAGNOSIS — I10 ESSENTIAL HYPERTENSION: Primary | ICD-10-CM

## 2019-05-13 DIAGNOSIS — K21.9 GASTROESOPHAGEAL REFLUX DISEASE, ESOPHAGITIS PRESENCE NOT SPECIFIED: ICD-10-CM

## 2019-05-13 DIAGNOSIS — R73.03 PREDIABETES: ICD-10-CM

## 2019-05-13 DIAGNOSIS — F41.9 ANXIETY: ICD-10-CM

## 2019-05-13 PROCEDURE — 99214 OFFICE O/P EST MOD 30 MIN: CPT | Performed by: INTERNAL MEDICINE

## 2019-05-13 RX ORDER — BROMPHENIRAMINE MALEATE, PSEUDOEPHEDRINE HYDROCHLORIDE, AND DEXTROMETHORPHAN HYDROBROMIDE 2; 30; 10 MG/5ML; MG/5ML; MG/5ML
5 SYRUP ORAL 4 TIMES DAILY PRN
Qty: 120 ML | Refills: 0 | Status: SHIPPED | OUTPATIENT
Start: 2019-05-13 | End: 2019-06-13

## 2019-05-13 NOTE — PROGRESS NOTES
Mackenzie Rueda   Date ofBirth:  1970    Date of Visit:  5/13/2019    Chief Complaint   Patient presents with    Anxiety    Gastroesophageal Reflux    Hyperlipidemia    Hypertension    Other     prediabetes       HPI  Hypertension- Pt takes Lisinopril-HCTZ 10-12.5mg po q day. Pt decreases salt. Pt is not exercising.     Hyperlipidemia-  Pt takes Fenofibrate 145mg po q day. Pt stats she tries to decreases fat.     Prediabetes- Pt states she doesn't decrease carbs and is a carb junkie.     Anxiety- Pt takes Sertraline 25mg po q day. Pt states her anxiety has been stable. Pt denies panic attacks, feeling nervous, or jittery.     GERD- Pt takes Omeprazole 20mg po q day. Pt denies heartburn and reflux. Pt states she gave up [employer]eps[employer]. Pt drinks iced tea. Ot doesn't drink coffee. Patient states she eats tomato based foods but they don't always bother her. Pt c/o being sick since 5/9/19 with dry cough, stuffy nose, and runny nose. Review of Systems   Constitutional: Negative for appetite change, chills, fatigue, fever and unexpected weight change. HENT: Positive for congestion and rhinorrhea. Negative for ear pain, postnasal drip, sinus pressure, sinus pain, sneezing, sore throat and trouble swallowing. Eyes: Negative for visual disturbance. Respiratory: Positive for cough. Negative for chest tightness, shortness of breath and wheezing. Cardiovascular: Negative for chest pain, palpitations and leg swelling. Gastrointestinal: Negative for abdominal pain, blood in stool, constipation, diarrhea, nausea and vomiting. Genitourinary: Negative for dysuria, frequency and hematuria. Musculoskeletal: Negative for arthralgias and myalgias. Skin: Negative for rash. Neurological: Negative for dizziness, tremors, syncope, weakness, light-headedness, numbness and headaches. Psychiatric/Behavioral: Negative for decreased concentration, dysphoric mood and sleep disturbance. The patient is nervous/anxious. Allergies   Allergen Reactions    Sulfa Antibiotics Rash     Outpatient Medications Marked as Taking for the 5/13/19 encounter (Office Visit) with Saundra Moreno MD   Medication Sig Dispense Refill    celecoxib (CELEBREX) 200 MG capsule Take 200 mg by mouth daily      sertraline (ZOLOFT) 25 MG tablet TAKE 1 TABLET BY MOUTH EVERY DAY 90 tablet 1    lisinopril-hydrochlorothiazide (PRINZIDE;ZESTORETIC) 10-12.5 MG per tablet TAKE 1 TABLET BY MOUTH DAILY 90 tablet 1    fenofibrate (TRICOR) 145 MG tablet TAKE 1 TABLET BY MOUTH DAILY 90 tablet 1    naproxen (NAPROSYN) 500 MG tablet Take 500 mg by mouth 2 times daily (with meals)      Multiple Vitamins-Minerals (THERAPEUTIC MULTIVITAMIN-MINERALS) tablet Take 1 tablet by mouth daily      ibuprofen (ADVIL;MOTRIN) 200 MG tablet Take 400 mg by mouth every 6 hours as needed for Pain      omeprazole (PRILOSEC) 20 MG capsule Take 1 capsule by mouth Daily 90 capsule 1    fluticasone (FLONASE) 50 MCG/ACT nasal spray 2 sprays by Nasal route daily. 1 Bottle 3         Vitals:    05/13/19 1441 05/13/19 1443   Pulse: 105 103   Temp: 97.8 °F (36.6 °C)    TempSrc: Oral    SpO2: 97%    Weight: 238 lb 12.8 oz (108.3 kg)    Height: 5' 1.5\" (1.562 m)      Body mass index is 44.39 kg/m². Physical Exam   Constitutional: She is oriented to person, place, and time. She appears well-developed and well-nourished. No distress. HENT:   Right Ear: Tympanic membrane and ear canal normal.   Left Ear: Tympanic membrane and ear canal normal.   Nose: Right sinus exhibits no maxillary sinus tenderness. Left sinus exhibits no maxillary sinus tenderness. Mouth/Throat: Oropharynx is clear and moist and mucous membranes are normal.   Eyes: Pupils are equal, round, and reactive to light. Conjunctivae, EOM and lids are normal.   Neck: Neck supple. Carotid bruit is not present. No thyromegaly present.    Cardiovascular: Normal rate, regular rhythm, S1 normal, S2 normal and normal heart sounds. Exam reveals no gallop and no friction rub. No murmur heard. Pulmonary/Chest: Effort normal and breath sounds normal. No respiratory distress. She has no wheezes. She has no rhonchi. She has no rales. Abdominal: Soft. Normal appearance and bowel sounds are normal. She exhibits no distension. There is no hepatosplenomegaly. There is no tenderness. Musculoskeletal: She exhibits no edema. Lymphadenopathy:        Head (right side): No submandibular adenopathy present. Head (left side): No submandibular adenopathy present. Neurological: She is alert and oriented to person, place, and time. Psychiatric: She has a normal mood and affect. Nursing note reviewed. No results found for this visit on 05/13/19.   Lab Review   Orders Only on 02/27/2019   Component Date Value    WBC 02/27/2019 7.3     RBC 02/27/2019 4.53     Hemoglobin 02/27/2019 12.3     Hematocrit 02/27/2019 36.2     MCV 02/27/2019 80.0     MCH 02/27/2019 27.2     MCHC 02/27/2019 34.0     RDW 02/27/2019 13.5     Platelets 37/12/3909 362     MPV 02/27/2019 7.9     Neutrophils % 02/27/2019 62.5     Lymphocytes % 02/27/2019 26.4     Monocytes % 02/27/2019 7.9     Eosinophils % 02/27/2019 2.8     Basophils % 02/27/2019 0.4     Neutrophils # 02/27/2019 4.6     Lymphocytes # 02/27/2019 1.9     Monocytes # 02/27/2019 0.6     Eosinophils # 02/27/2019 0.2     Basophils # 02/27/2019 0.0     Sodium 02/27/2019 139     Potassium 02/27/2019 3.7     Chloride 02/27/2019 99     CO2 02/27/2019 28     Anion Gap 02/27/2019 12     Glucose 02/27/2019 92     BUN 02/27/2019 14     CREATININE 02/27/2019 0.6     GFR Non- 02/27/2019 >60     GFR  02/27/2019 >60     Calcium 02/27/2019 10.1     Total Protein 02/27/2019 6.8     Alb 02/27/2019 4.5     Albumin/Globulin Ratio 02/27/2019 2.0     Total Bilirubin 02/27/2019 0.3     Alkaline Phosphatase 02/27/2019 69     ALT 02/27/2019 38     AST 02/27/2019 25     Globulin 02/27/2019 2.3     Hemoglobin A1C 02/27/2019 6.0     eAG 02/27/2019 125.5     TSH 02/27/2019 1.44          Assessment/Plan     1. Essential hypertension  -stable  -Continue same medications  -Low sodium diet  -Regular aerobic exercise    2. Mixed hyperlipidemia  -Continue same medications  -Low fat, low cholesterol diet  -Regular aerobic exercise    3. Gastroesophageal reflux disease, esophagitis presence not specified  -stable  -Continue same medications  -Decrease caffeine, avoid spicy foods, avoid tomato based foods  -Eat small meals instead of large meals  -Wait 2-3 hours after eating before lying down    4. Anxiety  -stable  -Continue same medications    5. Prediabetes  -Low carbohydrate diet  -Regular aerobic exercise    6. Upper respiratory tract infection, unspecified type  - brompheniramine-pseudoephedrine-DM (BROMFED DM) 2-30-10 MG/5ML syrup; Take 5 mLs by mouth 4 times daily as needed for Congestion or Cough  Dispense: 120 mL; Refill: 0      Discussed medications with patient, who voiced understanding of their use and indications. All questions answered.       Return in about 6 months (around 11/13/2019) for annual physical exam.

## 2019-05-19 ASSESSMENT — ENCOUNTER SYMPTOMS
TROUBLE SWALLOWING: 0
VOMITING: 0
NAUSEA: 0
DIARRHEA: 0
COUGH: 1
BLOOD IN STOOL: 0
SINUS PAIN: 0
WHEEZING: 0
SINUS PRESSURE: 0
RHINORRHEA: 1
ABDOMINAL PAIN: 0
CHEST TIGHTNESS: 0
SHORTNESS OF BREATH: 0
SORE THROAT: 0
CONSTIPATION: 0

## 2019-06-13 NOTE — PROGRESS NOTES
no eye makeup) or nail polish on your fingers or toes. 10. DO NOT wear any jewelry or piercings on day of surgery. All body piercing jewelry must be removed. 11. If you have ___dentures, they will be removed before going to the OR; we will provide you a container. If you wear ___contact lenses or ___glasses, they will be removed; please bring a case for them. 12. Please see your family doctor/pediatrician for a history & physical and/or concerning medications. Bring any test results/reports from your physician's office. PCP__________________Phone___________H&P Appt. Date________             13 If you  have a Living Will and Durable Power of  for Healthcare, please bring in a copy. 15. Notify your Surgeon if you develop any illness between now and surgery  time, cough, cold, fever, sore throat, nausea, vomiting, etc.  Please notify your surgeon if you experience dizziness, shortness of breath or blurred vision between now & the time of your surgery             15. X  DO NOT shave your operative site 96 hours prior to surgery. For face & neck surgery, men may use an electric razor 48 hours prior to surgery. 16. Shower the night before surgery with _X__Antibacterial soap ___Hibiclens             17. To provide excellent care visitors will be limited to one in the room at any given time. 18.  Please bring picture ID and insurance card. 19.  Visit our web site for additional information:  [email or web address]              20.During flu season no children under the age of 15 are permitted in the hospital for the safety of all patients.                               21. If you take a long acting insulin in the evening only  take half of your usual  dose the night  before your procedure              22. If you use a c-pap please bring DOS if staying overnight,             23.For your convenience [facility] has a pharmacy on site to fill your prescriptions. 24. If you use oxygen and have a portable tank please bring it  with you the DOS             25. Bring a complete list of all your medications with name and dose include any supplements. 26. Other__________________________________________   *Please call pre admission testing if you any further questions   Deena Barajas     [id number] [facility]  [phone]   [address] [place] [address]       All above information reviewed with patient in person or by phone. Patient verbalizes understanding. All questions and concerns addressed.                                                                                                  Patient/Rep____PATIENT________________                                                                                                                                    PRE OP INSTRUCTIONS

## 2019-06-24 ENCOUNTER — ANESTHESIA (OUTPATIENT)
Dept: OPERATING ROOM | Age: 49
End: 2019-06-24
Payer: COMMERCIAL

## 2019-06-24 ENCOUNTER — HOSPITAL ENCOUNTER (OUTPATIENT)
Age: 49
Setting detail: OUTPATIENT SURGERY
Discharge: HOME | End: 2019-06-24
Attending: PODIATRIST | Admitting: PODIATRIST
Payer: COMMERCIAL

## 2019-06-24 ENCOUNTER — ANESTHESIA EVENT (OUTPATIENT)
Dept: OPERATING ROOM | Age: 49
End: 2019-06-24
Payer: COMMERCIAL

## 2019-06-24 ENCOUNTER — APPOINTMENT (OUTPATIENT)
Dept: GENERAL RADIOLOGY | Age: 49
End: 2019-06-24
Attending: PODIATRIST
Payer: COMMERCIAL

## 2019-06-24 VITALS
SYSTOLIC BLOOD PRESSURE: 135 MMHG | TEMPERATURE: 96.9 F | HEIGHT: 62 IN | OXYGEN SATURATION: 100 % | WEIGHT: 232.56 LBS | BODY MASS INDEX: 42.8 KG/M2 | DIASTOLIC BLOOD PRESSURE: 85 MMHG | HEART RATE: 85 BPM | RESPIRATION RATE: 2 BRPM

## 2019-06-24 VITALS — OXYGEN SATURATION: 95 % | TEMPERATURE: 98.2 F | SYSTOLIC BLOOD PRESSURE: 117 MMHG | DIASTOLIC BLOOD PRESSURE: 89 MMHG

## 2019-06-24 DIAGNOSIS — G89.18 POST-OP PAIN: Primary | ICD-10-CM

## 2019-06-24 PROCEDURE — 7100000010 HC PHASE II RECOVERY - FIRST 15 MIN: Performed by: PODIATRIST

## 2019-06-24 PROCEDURE — 3700000000 HC ANESTHESIA ATTENDED CARE: Performed by: PODIATRIST

## 2019-06-24 PROCEDURE — 6360000002 HC RX W HCPCS: Performed by: NURSE ANESTHETIST, CERTIFIED REGISTERED

## 2019-06-24 PROCEDURE — 6370000000 HC RX 637 (ALT 250 FOR IP)

## 2019-06-24 PROCEDURE — 2580000003 HC RX 258: Performed by: PODIATRIST

## 2019-06-24 PROCEDURE — 3700000001 HC ADD 15 MINUTES (ANESTHESIA): Performed by: PODIATRIST

## 2019-06-24 PROCEDURE — 64445 NJX AA&/STRD SCIATIC NRV IMG: CPT | Performed by: FAMILY MEDICINE

## 2019-06-24 PROCEDURE — 7100000000 HC PACU RECOVERY - FIRST 15 MIN: Performed by: PODIATRIST

## 2019-06-24 PROCEDURE — 2720000010 HC SURG SUPPLY STERILE: Performed by: PODIATRIST

## 2019-06-24 PROCEDURE — 6360000002 HC RX W HCPCS: Performed by: FAMILY MEDICINE

## 2019-06-24 PROCEDURE — 2709999900 HC NON-CHARGEABLE SUPPLY: Performed by: PODIATRIST

## 2019-06-24 PROCEDURE — C1713 ANCHOR/SCREW BN/BN,TIS/BN: HCPCS | Performed by: PODIATRIST

## 2019-06-24 PROCEDURE — 7100000001 HC PACU RECOVERY - ADDTL 15 MIN: Performed by: PODIATRIST

## 2019-06-24 PROCEDURE — 2500000003 HC RX 250 WO HCPCS: Performed by: PODIATRIST

## 2019-06-24 PROCEDURE — 3600000014 HC SURGERY LEVEL 4 ADDTL 15MIN: Performed by: PODIATRIST

## 2019-06-24 PROCEDURE — 6360000002 HC RX W HCPCS: Performed by: PODIATRIST

## 2019-06-24 PROCEDURE — 7100000011 HC PHASE II RECOVERY - ADDTL 15 MIN: Performed by: PODIATRIST

## 2019-06-24 PROCEDURE — 3600000004 HC SURGERY LEVEL 4 BASE: Performed by: PODIATRIST

## 2019-06-24 PROCEDURE — 2500000003 HC RX 250 WO HCPCS: Performed by: NURSE ANESTHETIST, CERTIFIED REGISTERED

## 2019-06-24 DEVICE — SYSTEM IMPL W/ BIOCOMPOSITE SUT ANCHR 2X5IN JUMPSTART DSG: Type: IMPLANTABLE DEVICE | Site: HEEL | Status: FUNCTIONAL

## 2019-06-24 RX ORDER — CEFAZOLIN SODIUM 2 G/100ML
2 INJECTION, SOLUTION INTRAVENOUS
Status: COMPLETED | OUTPATIENT
Start: 2019-06-24 | End: 2019-06-24

## 2019-06-24 RX ORDER — MIDAZOLAM HYDROCHLORIDE 1 MG/ML
2 INJECTION INTRAMUSCULAR; INTRAVENOUS ONCE
Status: COMPLETED | OUTPATIENT
Start: 2019-06-24 | End: 2019-06-24

## 2019-06-24 RX ORDER — FENTANYL CITRATE 50 UG/ML
INJECTION, SOLUTION INTRAMUSCULAR; INTRAVENOUS PRN
Status: DISCONTINUED | OUTPATIENT
Start: 2019-06-24 | End: 2019-06-24 | Stop reason: SDUPTHER

## 2019-06-24 RX ORDER — LIDOCAINE HYDROCHLORIDE 20 MG/ML
INJECTION, SOLUTION EPIDURAL; INFILTRATION; INTRACAUDAL; PERINEURAL PRN
Status: DISCONTINUED | OUTPATIENT
Start: 2019-06-24 | End: 2019-06-24 | Stop reason: SDUPTHER

## 2019-06-24 RX ORDER — BUPIVACAINE HYDROCHLORIDE 5 MG/ML
INJECTION, SOLUTION EPIDURAL; INTRACAUDAL
Status: COMPLETED | OUTPATIENT
Start: 2019-06-24 | End: 2019-06-24

## 2019-06-24 RX ORDER — PROPOFOL 10 MG/ML
INJECTION, EMULSION INTRAVENOUS PRN
Status: DISCONTINUED | OUTPATIENT
Start: 2019-06-24 | End: 2019-06-24 | Stop reason: SDUPTHER

## 2019-06-24 RX ORDER — ONDANSETRON 2 MG/ML
INJECTION INTRAMUSCULAR; INTRAVENOUS PRN
Status: DISCONTINUED | OUTPATIENT
Start: 2019-06-24 | End: 2019-06-24 | Stop reason: SDUPTHER

## 2019-06-24 RX ORDER — FENTANYL CITRATE 50 UG/ML
100 INJECTION, SOLUTION INTRAMUSCULAR; INTRAVENOUS ONCE
Status: COMPLETED | OUTPATIENT
Start: 2019-06-24 | End: 2019-06-24

## 2019-06-24 RX ORDER — SODIUM CHLORIDE 9 MG/ML
INJECTION, SOLUTION INTRAVENOUS CONTINUOUS
Status: DISCONTINUED | OUTPATIENT
Start: 2019-06-24 | End: 2019-06-24 | Stop reason: HOSPADM

## 2019-06-24 RX ORDER — HYDROCODONE BITARTRATE AND ACETAMINOPHEN 5; 325 MG/1; MG/1
1 TABLET ORAL ONCE
Status: DISCONTINUED | OUTPATIENT
Start: 2019-06-24 | End: 2019-06-24 | Stop reason: HOSPADM

## 2019-06-24 RX ORDER — HYDROCODONE BITARTRATE AND ACETAMINOPHEN 5; 325 MG/1; MG/1
1 TABLET ORAL EVERY 6 HOURS PRN
Qty: 35 TABLET | Refills: 0 | Status: SHIPPED | OUTPATIENT
Start: 2019-06-24 | End: 2019-06-29

## 2019-06-24 RX ORDER — DEXAMETHASONE SODIUM PHOSPHATE 4 MG/ML
INJECTION, SOLUTION INTRA-ARTICULAR; INTRALESIONAL; INTRAMUSCULAR; INTRAVENOUS; SOFT TISSUE PRN
Status: DISCONTINUED | OUTPATIENT
Start: 2019-06-24 | End: 2019-06-24 | Stop reason: SDUPTHER

## 2019-06-24 RX ORDER — HYDROCODONE BITARTRATE AND ACETAMINOPHEN 5; 325 MG/1; MG/1
TABLET ORAL
Status: COMPLETED
Start: 2019-06-24 | End: 2019-06-24

## 2019-06-24 RX ORDER — SUCCINYLCHOLINE/SOD CL,ISO/PF 200MG/10ML
SYRINGE (ML) INTRAVENOUS PRN
Status: DISCONTINUED | OUTPATIENT
Start: 2019-06-24 | End: 2019-06-24 | Stop reason: SDUPTHER

## 2019-06-24 RX ORDER — LIDOCAINE HYDROCHLORIDE 10 MG/ML
0.5 INJECTION, SOLUTION EPIDURAL; INFILTRATION; INTRACAUDAL; PERINEURAL ONCE
Status: DISCONTINUED | OUTPATIENT
Start: 2019-06-24 | End: 2019-06-24 | Stop reason: HOSPADM

## 2019-06-24 RX ORDER — MIDAZOLAM HYDROCHLORIDE 1 MG/ML
INJECTION INTRAMUSCULAR; INTRAVENOUS PRN
Status: DISCONTINUED | OUTPATIENT
Start: 2019-06-24 | End: 2019-06-24 | Stop reason: SDUPTHER

## 2019-06-24 RX ADMIN — CEFAZOLIN SODIUM 2 G: 2 INJECTION, SOLUTION INTRAVENOUS at 07:27

## 2019-06-24 RX ADMIN — LIDOCAINE HYDROCHLORIDE 100 MG: 20 INJECTION, SOLUTION EPIDURAL; INFILTRATION; INTRACAUDAL; PERINEURAL at 07:35

## 2019-06-24 RX ADMIN — SODIUM CHLORIDE: 9 INJECTION, SOLUTION INTRAVENOUS at 07:30

## 2019-06-24 RX ADMIN — PROPOFOL 200 MG: 10 INJECTION, EMULSION INTRAVENOUS at 07:35

## 2019-06-24 RX ADMIN — PHENYLEPHRINE HYDROCHLORIDE 100 MCG: 10 INJECTION INTRAVENOUS at 08:46

## 2019-06-24 RX ADMIN — ONDANSETRON 4 MG: 2 INJECTION INTRAMUSCULAR; INTRAVENOUS at 07:54

## 2019-06-24 RX ADMIN — FENTANYL CITRATE 50 MCG: 50 INJECTION, SOLUTION INTRAMUSCULAR; INTRAVENOUS at 07:50

## 2019-06-24 RX ADMIN — HYDROCODONE BITARTRATE AND ACETAMINOPHEN 1 TABLET: 5; 325 TABLET ORAL at 09:53

## 2019-06-24 RX ADMIN — Medication 140 MG: at 07:35

## 2019-06-24 RX ADMIN — PHENYLEPHRINE HYDROCHLORIDE 100 MCG: 10 INJECTION INTRAVENOUS at 08:35

## 2019-06-24 RX ADMIN — FENTANYL CITRATE 50 MCG: 50 INJECTION INTRAMUSCULAR; INTRAVENOUS at 07:01

## 2019-06-24 RX ADMIN — DEXAMETHASONE SODIUM PHOSPHATE 8 MG: 4 INJECTION, SOLUTION INTRAMUSCULAR; INTRAVENOUS at 07:54

## 2019-06-24 RX ADMIN — MIDAZOLAM HYDROCHLORIDE 1 MG: 1 INJECTION, SOLUTION INTRAMUSCULAR; INTRAVENOUS at 07:30

## 2019-06-24 RX ADMIN — SODIUM CHLORIDE: 9 INJECTION, SOLUTION INTRAVENOUS at 06:40

## 2019-06-24 RX ADMIN — FENTANYL CITRATE 50 MCG: 50 INJECTION, SOLUTION INTRAMUSCULAR; INTRAVENOUS at 07:35

## 2019-06-24 RX ADMIN — SODIUM CHLORIDE: 9 INJECTION, SOLUTION INTRAVENOUS at 08:49

## 2019-06-24 RX ADMIN — MIDAZOLAM 2 MG: 1 INJECTION INTRAMUSCULAR; INTRAVENOUS at 07:01

## 2019-06-24 ASSESSMENT — PULMONARY FUNCTION TESTS
PIF_VALUE: 22
PIF_VALUE: 22
PIF_VALUE: 27
PIF_VALUE: 3
PIF_VALUE: 28
PIF_VALUE: 22
PIF_VALUE: 23
PIF_VALUE: 23
PIF_VALUE: 22
PIF_VALUE: 22
PIF_VALUE: 23
PIF_VALUE: 22
PIF_VALUE: 24
PIF_VALUE: 22
PIF_VALUE: 24
PIF_VALUE: 23
PIF_VALUE: 25
PIF_VALUE: 2
PIF_VALUE: 23
PIF_VALUE: 22
PIF_VALUE: 23
PIF_VALUE: 0
PIF_VALUE: 0
PIF_VALUE: 1
PIF_VALUE: 0
PIF_VALUE: 21
PIF_VALUE: 2
PIF_VALUE: 22
PIF_VALUE: 24
PIF_VALUE: 21
PIF_VALUE: 23
PIF_VALUE: 22
PIF_VALUE: 1
PIF_VALUE: 23
PIF_VALUE: 22
PIF_VALUE: 1
PIF_VALUE: 23
PIF_VALUE: 23
PIF_VALUE: 1
PIF_VALUE: 22
PIF_VALUE: 29
PIF_VALUE: 22
PIF_VALUE: 21
PIF_VALUE: 22
PIF_VALUE: 21
PIF_VALUE: 22
PIF_VALUE: 36
PIF_VALUE: 4
PIF_VALUE: 22
PIF_VALUE: 27
PIF_VALUE: 22
PIF_VALUE: 21
PIF_VALUE: 25
PIF_VALUE: 22
PIF_VALUE: 22
PIF_VALUE: 23
PIF_VALUE: 22
PIF_VALUE: 23
PIF_VALUE: 21
PIF_VALUE: 22
PIF_VALUE: 25
PIF_VALUE: 24
PIF_VALUE: 1
PIF_VALUE: 26
PIF_VALUE: 22
PIF_VALUE: 0
PIF_VALUE: 22
PIF_VALUE: 23
PIF_VALUE: 23
PIF_VALUE: 22
PIF_VALUE: 1
PIF_VALUE: 22
PIF_VALUE: 22
PIF_VALUE: 1

## 2019-06-24 ASSESSMENT — PAIN SCALES - GENERAL
PAINLEVEL_OUTOF10: 4
PAINLEVEL_OUTOF10: 0

## 2019-06-24 ASSESSMENT — PAIN - FUNCTIONAL ASSESSMENT
PAIN_FUNCTIONAL_ASSESSMENT: 0-10
PAIN_FUNCTIONAL_ASSESSMENT: PREVENTS OR INTERFERES SOME ACTIVE ACTIVITIES AND ADLS

## 2019-06-24 NOTE — ANESTHESIA PROCEDURE NOTES
Peripheral Block    Patient location during procedure: pre-op  Start time: 6/24/2019 7:08 AM  End time: 6/24/2019 7:12 AM  Staffing  Anesthesiologist: Tate Ross MD  Performed: anesthesiologist   Preanesthetic Checklist  Completed: patient identified, site marked, surgical consent, pre-op evaluation, timeout performed, IV checked, risks and benefits discussed, monitors and equipment checked, anesthesia consent given, oxygen available and patient being monitored  Peripheral Block  Patient position: supine  Prep: ChloraPrep  Patient monitoring: cardiac monitor, continuous pulse ox, frequent blood pressure checks and IV access  Block type: Sciatic  Laterality: right  Injection technique: single-shot  Procedures: ultrasound guided  Local infiltration: lidocaine  Infiltration strength: 1 %  Dose: 3 mL  Popliteal  Provider prep: mask and sterile gloves  Local infiltration: lidocaine  Needle  Needle type: combined needle/nerve stimulator   Needle gauge: 21 G  Needle length: 10 cm  Needle localization: ultrasound guidance  Assessment  Injection assessment: negative aspiration for heme, no paresthesia on injection and local visualized surrounding nerve on ultrasound  Paresthesia pain: none  Slow fractionated injection: yes  Hemodynamics: stable  Additional Notes  Right lateral sciatic block placed with US and NS to 0.8 mA. Poor pictures but good twitch. With local injection the nerve became more evident. No complications. 30 ml 0.5% bupi.         Reason for block: post-op pain management and at surgeon's request

## 2019-06-24 NOTE — ANESTHESIA PRE PROCEDURE
Department of Anesthesiology  Preprocedure Note       Name:  Reuben Newberry   Age:  51 y.o.  :  1970                                          MRN:  [id number]         Date:  2019      Surgeon: Reynaldo Travis):  Saw Chance DPM    Procedure: ACHILLES TENDON RELEASE RIGHT (POPLITEAL BLOCK) (Right Ankle)  HEEL EXOSTECTOMY RIGHT (Right )    Medications prior to admission:   Prior to Admission medications    Medication Sig Start Date End Date Taking? Authorizing Provider   sertraline (ZOLOFT) 25 MG tablet TAKE 1 TABLET BY MOUTH EVERY DAY 19   Kojo Boyer MD   lisinopril-hydrochlorothiazide (PRINZIDE;ZESTORETIC) 10-12.5 MG per tablet TAKE 1 TABLET BY MOUTH DAILY 18   Kojo Boyer MD   fenofibrate (TRICOR) 145 MG tablet TAKE 1 TABLET BY MOUTH DAILY 18   Kojo Boyer MD   Multiple Vitamins-Minerals (THERAPEUTIC MULTIVITAMIN-MINERALS) tablet Take 1 tablet by mouth daily    Historical Provider, MD   ibuprofen (ADVIL;MOTRIN) 200 MG tablet Take 400 mg by mouth every 6 hours as needed for Pain    Historical Provider, MD   omeprazole (PRILOSEC) 20 MG capsule Take 1 capsule by mouth Daily 12/17/15   Frank Escobar MD   fluticasone [facility]) 50 MCG/ACT nasal spray 2 sprays by Nasal route daily. 1/6/15   Emeterio Boyd DO       Current medications:    Current Facility-Administered Medications   Medication Dose Route Frequency Provider Last Rate Last Dose    0.9 % sodium chloride infusion   Intravenous Continuous Saw Chance DPM        lidocaine PF 1 % injection 0.5 mL  0.5 mL Intradermal Once Saw Chance DPM        ceFAZolin (ANCEF) 2 g in dextrose 4 % 100 mL IVPB (premix)  2 g Intravenous On Call to [address] DPM           Allergies:     Allergies   Allergen Reactions    Sulfa Antibiotics Rash       Problem List:    Patient Active Problem List   Diagnosis Code    HTN (hypertension) I10    Surgical menopause on hormone replacement 10.1 02/27/2019    BILITOT 0.3 02/27/2019    ALKPHOS 69 02/27/2019    AST 25 02/27/2019    ALT 38 02/27/2019       POC Tests: No results for input(s): POCGLU, POCNA, POCK, POCCL, POCBUN, POCHEMO, POCHCT in the last 72 hours. Coags: No results found for: PROTIME, INR, APTT    HCG (If Applicable): No results found for: PREGTESTUR, PREGSERUM, HCG, HCGQUANT     ABGs: No results found for: PHART, PO2ART, UNS2EJJ, NYP3EUD, BEART, S1EFJEJU     Type & Screen (If Applicable):  No results found for: LABABO, LABRH    Anesthesia Evaluation    Airway: Mallampati: II  TM distance: >3 FB   Neck ROM: full  Mouth opening: > = 3 FB Dental:          Pulmonary:                              Cardiovascular:    (+) hypertension:,         Rhythm: regular  Rate: normal                    Neuro/Psych:               GI/Hepatic/Renal:   (+) GERD:,           Endo/Other:                     Abdominal:           Vascular:                                        Anesthesia Plan      general     ASA 3       Induction: intravenous. Anesthetic plan and risks discussed with patient. Plan discussed with CRNA.                   Golden Manuel MD   6/24/2019

## 2019-06-24 NOTE — BRIEF OP NOTE
Brief Postoperative Note  ______________________________________________________________    Patient: Sanaz Cartagena  YOB: 1970  MRN: [id number]  Date of Procedure: 6/24/2019    Pre-Op Diagnosis: ACHILLES TENDINITIS RIGHT LEGT M76.61    Post-Op Diagnosis: Same       Procedure(s):  RIGHT ACHILLES TENDON RELEASE AND HEEL EXOSTECTOMY    Anesthesia: General    Surgeon(s):  Yeny Barrera DPM        Estimated Blood Loss (mL): less than 50     Complications: None    Specimens:   * No specimens in log *    Implants:  Implant Name Type Inv. Item Serial No.  Lot No. LRB No. Used   ANCHOR [employer] W/JUMPSTART Fastener [employer] W/JUMPSTART  [address] [id number] Right 1         Drains: * No LDAs found *    Findings: Large posterior spur removed. Small vertical tear of Achilles encountered and repaired.      Domingo Anne DPM  Date: 6/24/2019  Time: 8:51 AM

## 2019-06-24 NOTE — ANESTHESIA POSTPROCEDURE EVALUATION
Department of Anesthesiology  Postprocedure Note    Patient: Jarrett Friedman  MRN: [id number]  YOB: 1970  Date of evaluation: 6/24/2019  Time:  11:26 AM     Procedure Summary     Date:  06/24/19 Room / Location:  [facility] ASC OR [address] [place] [address] ASC OR    Anesthesia Start:  0730 Anesthesia Stop:  8617    Procedure:  RIGHT ACHILLES TENDON RELEASE AND HEEL EXOSTECTOMY (Right Ankle) Diagnosis:  (ACHILLES TENDINITIS RIGHT LEGT M76.61)    Surgeon:  Misa Sloan DPM Responsible Provider:  Yinka Fernández MD    Anesthesia Type:  general ASA Status:  3          Anesthesia Type: general    Mario Phase I: Mario Score: 10    Mario Phase II: Mario Score: 10    Last vitals: Reviewed and per EMR flowsheets.        Anesthesia Post Evaluation    Level of consciousness: awake  Complications: no

## 2019-06-24 NOTE — PROGRESS NOTES
Monitors applied. Time out completed prior to right popliteal block. Versed 2mg and fentanyl 50 mcg given IV as ordered by anesthesia.

## 2019-06-24 NOTE — H&P
[facility]   [facility] AND PHYSICAL      I am seeing KECIA RASHEL COOK at the request of Dr Ankush Green for evaluation of the patient's medical problems prior to right achilles tendon release right ( popliteal block) right ankle heel exostectomy right         6/24/2019 6:42 AM    Patient Information:  KECIA Tariq is a 51 y.o. female [id number]  PCP:  Sherrie Goemz MD (Tel: [phone] )    Chief complaint:  I am having surgery on my achilles     History of Present Illness:  Steven Renee is a 51 y.o. female who presented with right foot pain. Symptom onset was gradual for a time period of several weeks. The severity is described as moderate. The course of her symptoms over time is recurrent. The symptoms improved with non weight bearing and worsened with weight bearing. The patient's symptom is associated with heel and lower leg pain. History obtained from patient and record review     REVIEW OF SYSTEMS:   Constitutional:  Negative for fever,chills or night sweats  ENT:  Negative for rhinorrhea, epistaxis, hoarseness, sore throat. Respiratory:   Negative for shortness of breath,wheezing  Cardiovascular:   Negative for  chest pain, palpitations   Gastrointestinal:  Negative for nausea, vomiting, diarrhea  Genitourinary:  Negative for polyuria, dysuria   Hematologic/Lymphatic:  Negative for  bleeding tendency, easy bruising  Musculoskeletal:  Negative for myalgias and arthralgias  Neurologic:  Negative for  confusion,dysarthria. Skin:  Negative for itching,rash  Psychiatric:  Negative for depression,anxiety, agitation. Endocrine:  Negative for polydipsia,polyuria,heat /cold intolerance. Past Medical History:   has a past medical history of Anxiety, Endometriosis, Hyperlipidemia, and Hypertension. Past Surgical History:   has a past surgical history that includes Foot surgery (Left, 2013); Hysterectomy (2013);  Tubal ligation (); Cholecystectomy (); Appendectomy (); Colonoscopy (2015); and  section. Medications:  No current facility-administered medications on file prior to encounter. Current Outpatient Medications on File Prior to Encounter   Medication Sig Dispense Refill    sertraline (ZOLOFT) 25 MG tablet TAKE 1 TABLET BY MOUTH EVERY DAY 90 tablet 1    lisinopril-hydrochlorothiazide (PRINZIDE;ZESTORETIC) 10-12.5 MG per tablet TAKE 1 TABLET BY MOUTH DAILY 90 tablet 1    fenofibrate (TRICOR) 145 MG tablet TAKE 1 TABLET BY MOUTH DAILY 90 tablet 1    Multiple Vitamins-Minerals (THERAPEUTIC MULTIVITAMIN-MINERALS) tablet Take 1 tablet by mouth daily      omeprazole (PRILOSEC) 20 MG capsule Take 1 capsule by mouth Daily 90 capsule 1    ibuprofen (ADVIL;MOTRIN) 200 MG tablet Take 400 mg by mouth every 6 hours as needed for Pain      fluticasone (FLONASE) 50 MCG/ACT nasal spray 2 sprays by Nasal route daily. 1 Bottle 3       Allergies: Allergies   Allergen Reactions    Sulfa Antibiotics Rash        Social History:   reports that she has never smoked. She has never used smokeless tobacco. She reports that she drinks about 0.5 oz of alcohol per week. She reports that she does not use drugs. Family History:  family history includes Cancer in her paternal grandmother; Cancer (age of onset: 58) in her maternal aunt; Cancer (age of onset: 72) in her maternal grandmother; Diabetes in her mother; High Blood Pressure in her father and mother; Stroke in her mother. Physical Exam:  /89   Pulse 84   Temp 97.9 °F (36.6 °C) (Temporal)   Resp 18   Ht 5' 2\" (1.575 m)   Wt 230 lb (104.3 kg)   SpO2 99%   BMI 42.07 kg/m²     General appearance:  Appears comfortable. Well nourished  Eyes: Sclera clear, pupils equal  ENT: Moist mucus membranes, no thrush. Trachea midline. Cardiovascular: Regular rhythm, normal S1, S2. No murmur, gallop, rub.  No edema in lower extremities  Respiratory: Clear to auscultation bilaterally, no wheeze, good inspiratory effort  Gastrointestinal: Abdomen soft, non-tender, not distended, normal bowel sounds  Musculoskeletal: No cyanosis in digits, neck supple  Neurology: Cranial nerves grossly intact. Alert and oriented in time, place and person. No speech or motor deficits  Psychiatry: Appropriate affect. Not agitated  Skin: Warm, dry, normal turgor, no rash    Labs:  CBC:   Lab Results   Component Value Date    WBC 7.3 02/27/2019    RBC 4.53 02/27/2019    HGB 12.3 02/27/2019    HCT 36.2 02/27/2019    MCV 80.0 02/27/2019    MCH 27.2 02/27/2019    MCHC 34.0 02/27/2019    RDW 13.5 02/27/2019     02/27/2019    MPV 7.9 02/27/2019     BMP:    Lab Results   Component Value Date     02/27/2019    K 3.7 02/27/2019    CL 99 02/27/2019    CO2 28 02/27/2019    BUN 14 02/27/2019    CREATININE 0.6 02/27/2019    CALCIUM 10.1 02/27/2019    GFRAA >60 02/27/2019    LABGLOM >60 02/27/2019    GLUCOSE 92 02/27/2019         Problem List  HTN  GERD  Foot pain      Assessment & Recommendation:     1. HTN:  BP  In range on current therpay   2. GERD: no current sx on PPI therapy  3. DEPRESSION:  Stable on home therapy   4. Foot pain: for surgery today      Patient is medically optimized for surgery. Thank you for the opportunity to participate in the care of your patient.   Venkata Calderón, TROY - CNP    6/24/2019 6:42 AM

## 2019-06-24 NOTE — PROGRESS NOTES
CLINICAL PHARMACY NOTE: MEDS TO [address] Select Patient?: No  Total # of Prescriptions Filled: 1   The following medications were delivered to the patient:  · Hydrocodone/APAP  Total # of Interventions Completed: 0  Time Spent (min): 75    Additional Documentation:  Delivered to patient  Richy Ortega

## 2019-06-30 DIAGNOSIS — F41.9 ANXIETY: ICD-10-CM

## 2019-07-01 NOTE — TELEPHONE ENCOUNTER
Medication:   Requested Prescriptions     Pending Prescriptions Disp Refills    sertraline (ZOLOFT) 25 MG tablet [Pharmacy Med Name: SERTRALINE HCL 25 MG TABLET] 90 tablet 0     Sig: TAKE ONE TABLET BY MOUTH DAILY       Last Filled:  1/2/2019    Patient Phone Number: [phone] (home)     Last appt: 5/13/2019   Next appt: 11/26/2019    Last Labs DM:   Lab Results   Component Value Date    LABA1C 6.0 02/27/2019     Last Lipid:   Lab Results   Component Value Date    CHOL 182 06/01/2018    TRIG 261 06/01/2018    HDL 26 06/01/2018    LDLCALC 104 06/01/2018     Last PSA: No results found for: PSA  Last Thyroid:   Lab Results   Component Value Date    TSH 1.44 02/27/2019    T4FREE 1.2 01/09/2015

## 2019-07-02 RX ORDER — SERTRALINE HYDROCHLORIDE 25 MG/1
TABLET, FILM COATED ORAL
Qty: 90 TABLET | Refills: 1 | Status: SHIPPED | OUTPATIENT
Start: 2019-07-02 | End: 2020-03-20 | Stop reason: SDUPTHER

## 2019-07-04 DIAGNOSIS — I10 ESSENTIAL HYPERTENSION: ICD-10-CM

## 2019-07-04 DIAGNOSIS — E78.2 MIXED HYPERLIPIDEMIA: ICD-10-CM

## 2019-07-05 RX ORDER — FENOFIBRATE 145 MG/1
TABLET, COATED ORAL
Qty: 90 TABLET | Refills: 1 | Status: SHIPPED | OUTPATIENT
Start: 2019-07-05 | End: 2020-01-01

## 2019-07-05 RX ORDER — LISINOPRIL AND HYDROCHLOROTHIAZIDE 12.5; 1 MG/1; MG/1
TABLET ORAL
Qty: 90 TABLET | Refills: 1 | Status: SHIPPED | OUTPATIENT
Start: 2019-07-05 | End: 2020-01-01

## 2019-07-30 NOTE — OP NOTE
[facility]                     [facility], [address] [place] [address]                                OPERATIVE REPORT    PATIENT NAME: KECIA COOK                      :        1970  MED REC NO:   [id number]                          ROOM:  ACCOUNT NO:   [de-identified]                           ADMIT DATE: 2019  PROVIDER:     Tiffanie Fontaine DPM    DATE OF PROCEDURE:  2019    PREOPERATIVE DIAGNOSES:  1. Raj's deformity, right foot. 2.  Achilles tendinitis, right foot. POSTOPERATIVE DIAGNOSES:  1. Raj's deformity, right foot. 2.  Longitudinal Achilles tendon tear, right Achilles tendon. OPERATION PERFORMED:  1. Right posterior calcaneal exostectomy. 2.  Right Achilles tendon repair. SURGEON:  Tiffanie Fontaine DPM    ANESTHESIA:  General.    HEMOSTASIS:  Thigh tourniquet set at 350 mmHg. INDICATIONS:  This 56-year-old female presented to my office with a  chief complaint of pain along the posterior aspect of her right heel and  ankle. The patient had tried conservative care including physical  therapy, antiinflammatories, and immobilization in a walking boot;  however, none of these have helped for very long. The patient would  like to proceed with surgical options at this time. The patient was  seen preoperatively. Procedure and postop course were discussed as well  as alternatives, risks, and complications. No guarantees were given. All questions were answered to the patient's satisfaction. The patient  agrees to comply and opts to proceed with the operation. REPORT OF OPERATION:  The patient was brought to the operating room,  properly identified and placed on the operating room table in the prone  position. The right lower extremity was then scrubbed, prepped, and  draped in the usual sterile and aseptic manner.   An Esmarch bandage was  then used to exsanguinate the right lower extremity and a previously  applied well-padded thigh tourniquet was inflated to 350 mmHg. Attention was then directed to the posterior aspect of the right heel  and ankle area where a linear longitudinal incision of approximately 6  cm in length was made. The incision was deepened in the same plane with  care taken to identify and retract all vital neurovascular structures. All superficial bleeders were cauterized or ligated as deemed necessary. The incision was carried down to the level of the paratenon of the  Achilles tendon where the paratenon was longitudinally incised and  marked with 2-0 Vicryl. The paratenon was then reflected off the  posterior aspect of the Achilles tendon. Upon inspection of the  Achilles tendon, it was noted that there was some discoloration and  fibrosis of the posterior aspect of the central area of the Achilles  tendon. Upon further inspection it was noted that there was a small  longitudinal tear in the midsubstance of the Achilles tendon. At this  time, a #15 blade was used to incise the Achilles tendon longitudinally  and then the Achilles tendon was then reflected off the posterior aspect  of the calcaneus. Next, with the use of a sagittal saw and an  aggressive hand rasp, the posterior Raj's resection and bone spur  was removed and remodeled along the posterior right calcaneus. The  surgical site was then evaluated intraoperatively and using  intraoperative fluoroscopy an adequate resection of the Raj's  deformity as well as the posterior calcaneal exostectomy had been  achieved. The surgical site was then flushed with copious amounts of  normal sterile saline, suctioned and found to be free of all debris. Next, the Achilles tendon was evaluated and all fibrotic and degenerated  fibers of the Achilles tendon was debrided. Next, the Achilles tendon  was then repaired using 2-0 FiberWire. At this time, reattachment of  the Achilles tendon was achieved using the Arthrex SpeedBridge. Four  holes were drilled in the posterior aspect of the calcaneus and the  FiberWire and suture was passed through the Achilles tendon and the  anchors were inserted into the calcaneus in accordance with normal  operation of the Arthrex Achilles tendon SpeedBridge. The foot was put  through its range of motion and it was noted that adequate length and  tightness had been achieved to the Achilles tendon. The surgical site  was flushed with copious amounts of normal sterile saline and closure  was undertaken in layers with paratenon being closed using 2-0 Vicryl,  subcutaneous tissue was closed using 2-0 Vicryl, and the skin was closed  using staples. The patient was then placed in a postoperative dressing  consisting of Adaptic, 4 x 4s, Kerlix, and a modified Sinclair compression  dressing of cast padding, posterior splint, and a double Ace wrap was  applied to the right lower extremity. The patient tolerated the  procedure and anesthesia well and left the operating room with vital  signs stable and vascular status intact to all digits of her right foot. Following a period of postoperative monitoring, she will be discharged  home after being given both oral and written home-going instructions. She is to follow up in my office within one week.         Bijal Dan DPM    D: 07/30/2019 0:14:04       T: 07/30/2019 7:29:17     [email or web address]  Job#: [id number]     Doc#: [id number]    CC:

## 2019-09-23 ENCOUNTER — HOSPITAL ENCOUNTER (OUTPATIENT)
Dept: WOUND CARE | Age: 49
Discharge: HOME | End: 2019-09-23
Payer: COMMERCIAL

## 2019-09-23 VITALS
HEART RATE: 88 BPM | TEMPERATURE: 97.3 F | RESPIRATION RATE: 16 BRPM | SYSTOLIC BLOOD PRESSURE: 145 MMHG | DIASTOLIC BLOOD PRESSURE: 86 MMHG

## 2019-09-23 PROCEDURE — 99203 OFFICE O/P NEW LOW 30 MIN: CPT

## 2019-09-23 PROCEDURE — 11042 DBRDMT SUBQ TIS 1ST 20SQCM/<: CPT | Performed by: PODIATRIST

## 2019-09-23 RX ORDER — NAPROXEN 250 MG/1
250 TABLET ORAL 2 TIMES DAILY WITH MEALS
COMMUNITY
End: 2020-03-20

## 2019-09-23 RX ORDER — LIDOCAINE 40 MG/G
CREAM TOPICAL PRN
Status: DISCONTINUED | OUTPATIENT
Start: 2019-09-23 | End: 2019-09-24 | Stop reason: HOSPADM

## 2019-09-23 ASSESSMENT — PAIN SCALES - GENERAL: PAINLEVEL_OUTOF10: 3

## 2019-09-23 ASSESSMENT — PAIN DESCRIPTION - ORIENTATION: ORIENTATION: RIGHT

## 2019-09-23 ASSESSMENT — PAIN DESCRIPTION - PAIN TYPE: TYPE: SURGICAL PAIN

## 2019-09-23 ASSESSMENT — PAIN DESCRIPTION - DESCRIPTORS: DESCRIPTORS: BURNING

## 2019-09-23 ASSESSMENT — PAIN DESCRIPTION - FREQUENCY: FREQUENCY: INTERMITTENT

## 2019-09-23 ASSESSMENT — PAIN DESCRIPTION - LOCATION: LOCATION: FOOT

## 2019-09-23 NOTE — PROGRESS NOTES
Debridement    Using curette the wound was sharply debrided    down through and including the removal of epidermis, dermis and subcutaneous tissue. Devitalized Tissue Debrided:  fibrin, biofilm and slough    Pre Debridement Measurements:  Are located in the Wound Documentation Flow Sheet    Wound #: 1     Post  Debridement Measurements:  Wound 09/23/19 Heel Right #1 (Active)   Wound Image   9/23/2019  2:05 PM   Wound Pressure Stage  3 9/23/2019  2:05 PM   Wound Cleansed Rinsed/Irrigated with saline 9/23/2019  2:05 PM   Wound Length (cm) 2.3 cm 9/23/2019  2:05 PM   Wound Width (cm) 1.8 cm 9/23/2019  2:05 PM   Wound Depth (cm) 0.6 cm 9/23/2019  2:05 PM   Wound Surface Area (cm^2) 4.14 cm^2 9/23/2019  2:05 PM   Wound Volume (cm^3) 2.48 cm^3 9/23/2019  2:05 PM   Post-Procedure Length (cm) 2.3 cm 9/23/2019  2:17 PM   Post-Procedure Width (cm) 1.8 cm 9/23/2019  2:17 PM   Post-Procedure Depth (cm) 0.6 cm 9/23/2019  2:17 PM   Post-Procedure Surface Area (cm^2) 4.14 cm^2 9/23/2019  2:17 PM   Post-Procedure Volume (cm^3) 2.48 cm^3 9/23/2019  2:17 PM   Wound Assessment Bleeding 9/23/2019  2:17 PM   Drainage Amount Moderate 9/23/2019  2:17 PM   Drainage Description Serosanguinous 9/23/2019  2:05 PM   Odor None 9/23/2019  2:05 PM   [place]%Wound Bed 0 9/23/2019  2:05 PM   Red%Wound Bed 0 9/23/2019  2:05 PM   Yellow%Wound Bed 100 9/23/2019  2:05 PM   Black%Wound Bed 0 9/23/2019  2:05 PM   Purple%Wound Bed 0 9/23/2019  2:05 PM   Other%Wound Bed 0 9/23/2019  2:05 PM   Number of days: 0           Total Surface Area Debrided:  4.14 sq cm     Percentage of wound debrided 100%    Bleeding:  Minimal    Hemostasis Achieved:  by pressure    Procedural Pain:  2  / 10     Post Procedural Pain:  0 / 10     Response to treatment:  Well tolerated by patient. Plan:     The nature of the patient's condition was explained in depth.  The patient was informed that their compliance to the treatment plan is paramount to successful healing and prevention of further ulceration and/or infection     Discharge Treatment       With each dressing change, rinse wounds with 0.9% Saline. (May use wound wash or soft contact solution. Both can be purchased at a local drug store). If unable to obtain saline, may use a gentle soap and water. Dressing care:Right Foot- pack with collagen, betadine to the karina wound, dry dressing.  Change every other day    Written Patient Discharge Instructions Given            Electronically signed by Nic Enrique DPM on 9/23/2019 at 2:24 PM

## 2019-09-30 ENCOUNTER — HOSPITAL ENCOUNTER (OUTPATIENT)
Dept: WOUND CARE | Age: 49
Discharge: HOME | End: 2019-09-30
Payer: COMMERCIAL

## 2019-09-30 VITALS — HEART RATE: 97 BPM | SYSTOLIC BLOOD PRESSURE: 145 MMHG | RESPIRATION RATE: 16 BRPM | DIASTOLIC BLOOD PRESSURE: 79 MMHG

## 2019-09-30 PROCEDURE — 11042 DBRDMT SUBQ TIS 1ST 20SQCM/<: CPT

## 2019-09-30 RX ORDER — LIDOCAINE 40 MG/G
CREAM TOPICAL PRN
Status: DISCONTINUED | OUTPATIENT
Start: 2019-09-30 | End: 2019-10-01 | Stop reason: HOSPADM

## 2019-09-30 ASSESSMENT — PAIN SCALES - GENERAL: PAINLEVEL_OUTOF10: 5

## 2019-09-30 ASSESSMENT — PAIN DESCRIPTION - DESCRIPTORS: DESCRIPTORS: BURNING

## 2019-09-30 ASSESSMENT — PAIN DESCRIPTION - PAIN TYPE: TYPE: SURGICAL PAIN

## 2019-09-30 ASSESSMENT — PAIN DESCRIPTION - FREQUENCY: FREQUENCY: INTERMITTENT

## 2019-09-30 ASSESSMENT — PAIN DESCRIPTION - ORIENTATION: ORIENTATION: RIGHT

## 2019-09-30 ASSESSMENT — PAIN DESCRIPTION - LOCATION: LOCATION: FOOT

## 2019-10-14 ENCOUNTER — HOSPITAL ENCOUNTER (OUTPATIENT)
Dept: WOUND CARE | Age: 49
Discharge: HOME | End: 2019-10-14
Payer: COMMERCIAL

## 2019-10-14 VITALS
TEMPERATURE: 97.2 F | HEART RATE: 89 BPM | DIASTOLIC BLOOD PRESSURE: 76 MMHG | SYSTOLIC BLOOD PRESSURE: 118 MMHG | BODY MASS INDEX: 45.45 KG/M2 | WEIGHT: 247 LBS | HEIGHT: 62 IN

## 2019-10-14 PROCEDURE — 11042 DBRDMT SUBQ TIS 1ST 20SQCM/<: CPT

## 2019-10-14 RX ORDER — LIDOCAINE 40 MG/G
CREAM TOPICAL PRN
Status: DISCONTINUED | OUTPATIENT
Start: 2019-10-14 | End: 2019-10-15 | Stop reason: HOSPADM

## 2019-10-28 ENCOUNTER — HOSPITAL ENCOUNTER (OUTPATIENT)
Dept: WOUND CARE | Age: 49
Discharge: HOME | End: 2019-10-28
Payer: COMMERCIAL

## 2019-10-28 VITALS — RESPIRATION RATE: 16 BRPM | HEART RATE: 87 BPM | SYSTOLIC BLOOD PRESSURE: 136 MMHG | DIASTOLIC BLOOD PRESSURE: 87 MMHG

## 2019-10-28 PROCEDURE — 11042 DBRDMT SUBQ TIS 1ST 20SQCM/<: CPT

## 2019-10-28 RX ORDER — LIDOCAINE 40 MG/G
CREAM TOPICAL PRN
Status: DISCONTINUED | OUTPATIENT
Start: 2019-10-28 | End: 2019-10-29 | Stop reason: HOSPADM

## 2019-10-28 ASSESSMENT — PAIN DESCRIPTION - DESCRIPTORS: DESCRIPTORS: SORE

## 2019-10-28 ASSESSMENT — PAIN DESCRIPTION - PAIN TYPE: TYPE: CHRONIC PAIN

## 2019-10-28 ASSESSMENT — PAIN SCALES - GENERAL: PAINLEVEL_OUTOF10: 2

## 2019-10-28 ASSESSMENT — PAIN DESCRIPTION - ORIENTATION: ORIENTATION: RIGHT

## 2019-10-28 ASSESSMENT — PAIN DESCRIPTION - LOCATION: LOCATION: FOOT

## 2019-11-11 ENCOUNTER — HOSPITAL ENCOUNTER (OUTPATIENT)
Dept: WOUND CARE | Age: 49
Discharge: HOME | End: 2019-11-11
Payer: COMMERCIAL

## 2019-11-11 VITALS — SYSTOLIC BLOOD PRESSURE: 138 MMHG | HEART RATE: 99 BPM | DIASTOLIC BLOOD PRESSURE: 78 MMHG | RESPIRATION RATE: 16 BRPM

## 2019-11-11 PROCEDURE — 99212 OFFICE O/P EST SF 10 MIN: CPT

## 2019-11-11 RX ORDER — LIDOCAINE 40 MG/G
CREAM TOPICAL PRN
Status: DISCONTINUED | OUTPATIENT
Start: 2019-11-11 | End: 2019-11-12 | Stop reason: HOSPADM

## 2020-03-20 ENCOUNTER — HOSPITAL ENCOUNTER (OUTPATIENT)
Age: 50
Discharge: HOME | End: 2020-03-20
Payer: COMMERCIAL

## 2020-03-20 ENCOUNTER — OFFICE VISIT (OUTPATIENT)
Dept: PRIMARY CARE CLINIC | Age: 50
End: 2020-03-20
Payer: COMMERCIAL

## 2020-03-20 ENCOUNTER — HOSPITAL ENCOUNTER (OUTPATIENT)
Dept: GENERAL RADIOLOGY | Age: 50
Discharge: HOME | End: 2020-03-20
Payer: COMMERCIAL

## 2020-03-20 VITALS
HEART RATE: 86 BPM | DIASTOLIC BLOOD PRESSURE: 70 MMHG | WEIGHT: 247 LBS | SYSTOLIC BLOOD PRESSURE: 118 MMHG | OXYGEN SATURATION: 98 % | BODY MASS INDEX: 45.45 KG/M2 | HEIGHT: 62 IN

## 2020-03-20 PROBLEM — M79.644 PAIN OF RIGHT MIDDLE FINGER: Status: ACTIVE | Noted: 2020-03-20

## 2020-03-20 PROBLEM — J30.9 ALLERGIC RHINITIS: Status: ACTIVE | Noted: 2020-03-20

## 2020-03-20 PROCEDURE — 90471 IMMUNIZATION ADMIN: CPT | Performed by: INTERNAL MEDICINE

## 2020-03-20 PROCEDURE — 99214 OFFICE O/P EST MOD 30 MIN: CPT | Performed by: INTERNAL MEDICINE

## 2020-03-20 PROCEDURE — 90653 IIV ADJUVANT VACCINE IM: CPT | Performed by: INTERNAL MEDICINE

## 2020-03-20 PROCEDURE — 73130 X-RAY EXAM OF HAND: CPT

## 2020-03-20 RX ORDER — SERTRALINE HYDROCHLORIDE 25 MG/1
TABLET, FILM COATED ORAL
Qty: 90 TABLET | Refills: 1 | Status: SHIPPED | OUTPATIENT
Start: 2020-03-20 | End: 2020-09-12

## 2020-03-20 RX ORDER — LISINOPRIL AND HYDROCHLOROTHIAZIDE 12.5; 1 MG/1; MG/1
1 TABLET ORAL DAILY
Qty: 90 TABLET | Refills: 1 | Status: SHIPPED | OUTPATIENT
Start: 2020-03-20 | End: 2020-09-12

## 2020-03-20 RX ORDER — FENOFIBRATE 145 MG/1
145 TABLET, COATED ORAL DAILY
Qty: 90 TABLET | Refills: 1 | Status: SHIPPED | OUTPATIENT
Start: 2020-03-20 | End: 2020-09-12

## 2020-03-20 ASSESSMENT — PATIENT HEALTH QUESTIONNAIRE - PHQ9
SUM OF ALL RESPONSES TO PHQ9 QUESTIONS 1 & 2: 0
2. FEELING DOWN, DEPRESSED OR HOPELESS: 0
SUM OF ALL RESPONSES TO PHQ QUESTIONS 1-9: 0
1. LITTLE INTEREST OR PLEASURE IN DOING THINGS: 0
SUM OF ALL RESPONSES TO PHQ QUESTIONS 1-9: 0

## 2020-03-20 ASSESSMENT — ENCOUNTER SYMPTOMS
SORE THROAT: 0
CHEST TIGHTNESS: 0
BLOOD IN STOOL: 0
EYE ITCHING: 1
TROUBLE SWALLOWING: 0
SINUS PAIN: 0
CONSTIPATION: 0
VOMITING: 0
RHINORRHEA: 1
COUGH: 0
NAUSEA: 0
SHORTNESS OF BREATH: 0
DIARRHEA: 0
ABDOMINAL PAIN: 0
SINUS PRESSURE: 0
WHEEZING: 0

## 2020-03-20 NOTE — PATIENT INSTRUCTIONS
last up to 10 days. The flu can make you feel very sick, but most of the time it doesn't lead to other problems. But it can cause serious problems in people who are older or who have a long-term illness, such as heart disease or diabetes. You can help prevent the flu by getting a flu vaccine every year, as soon as it is available. You cannot get the flu from the vaccine. The vaccine prevents most cases of the flu. But even when the vaccine doesn't prevent the flu, it can make symptoms less severe and reduce the chance of problems from the flu. Sometimes, young children and people who have an immune system problem may have a slight fever or muscle aches or pains 6 to 12 hours after getting the shot. These symptoms usually last 1 or 2 days. Follow-up care is a key part of your treatment and safety. Be sure to make and go to all appointments, and call your doctor if you are having problems. It's also a good idea to know your test results and keep a list of the medicines you take. Who should get the flu vaccine? Everyone age 7 months or older should get a flu vaccine each year. It lowers the chance of getting and spreading the flu. The vaccine is very important for people who are at high risk for getting other health problems from the flu. This includes:  · Anyone 48years of age or older. · People who live in a long-term care center, such as a nursing home. · All children 6 months through 25years of age. · Adults and children 6 months and older who have long-term heart or lung problems, such as asthma. · Adults and children 6 months and older who needed medical care or were in a hospital during the past year because of diabetes, chronic kidney disease, or a weak immune system (including HIV or AIDS). · Women who will be pregnant during the flu season. · People who have any condition that can make it hard to breathe or swallow (such as a brain injury or muscle disorders).   · People who can give the flu to click on the \"Sign Up Now\" link. Current as of: December 8, 2019Content Version: 12.4  © 0369-9858 [employer]. Care instructions adapted under license by [facility] ([facility]). If you have questions about a medical condition or this instruction, always ask your healthcare professional. [address] any warranty or liability for your use of this information. Patient Education        Influenza (Flu) Vaccine: Care Instructions  Your Care Instructions    Influenza (flu) is an infection in the lungs and breathing passages. It is caused by the influenza virus. There are different strains, or types, of the flu virus every year. The flu comes on quickly. It can cause a cough, stuffy nose, fever, chills, tiredness, and aches and pains. These symptoms may last up to 10 days. The flu can make you feel very sick, but most of the time it doesn't lead to other problems. But it can cause serious problems in people who are older or who have a long-term illness, such as heart disease or diabetes. You can help prevent the flu by getting a flu vaccine every year, as soon as it is available. You cannot get the flu from the vaccine. The vaccine prevents most cases of the flu. But even when the vaccine doesn't prevent the flu, it can make symptoms less severe and reduce the chance of problems from the flu. Sometimes, young children and people who have an immune system problem may have a slight fever or muscle aches or pains 6 to 12 hours after getting the shot. These symptoms usually last 1 or 2 days. Follow-up care is a key part of your treatment and safety. Be sure to make and go to all appointments, and call your doctor if you are having problems. It's also a good idea to know your test results and keep a list of the medicines you take. Who should get the flu vaccine? Everyone age 7 months or older should get a flu vaccine each year. It lowers the chance of getting and spreading the flu.  The vaccine is very important for people who are at high risk for getting other health problems from the flu. This includes:  · Anyone 48years of age or older. · People who live in a long-term care center, such as a nursing home. · All children 6 months through 25years of age. · Adults and children 6 months and older who have long-term heart or lung problems, such as asthma. · Adults and children 6 months and older who needed medical care or were in a hospital during the past year because of diabetes, chronic kidney disease, or a weak immune system (including HIV or AIDS). · Women who will be pregnant during the flu season. · People who have any condition that can make it hard to breathe or swallow (such as a brain injury or muscle disorders). · People who can give the flu to others who are at high risk for problems from the flu. This includes all health care workers and close contacts of people age 72 or older. Who should not get the flu vaccine? The person who gives the vaccine may tell you not to get it if you:  · Have a severe allergy to eggs or any part of the vaccine. · Have had a severe reaction to a flu vaccine in the past.  · Have had Guillain-Barré syndrome (GBS). · Are sick with a fever. (Get the vaccine when symptoms are gone.)  How can you care for yourself at home? · If you or your child has a sore arm or a slight fever after the shot, take an over-the-counter pain medicine, such as acetaminophen (Tylenol) or ibuprofen (Advil, Motrin). Read and follow all instructions on the label. Do not give aspirin to anyone younger than 20. It has been linked to Reye syndrome, a serious illness. · Do not take two or more pain medicines at the same time unless the doctor told you to. Many pain medicines have acetaminophen, which is Tylenol. Too much acetaminophen (Tylenol) can be harmful. When should you call for help? Call 911 anytime you think you may need emergency care.  For example, call if after

## 2020-03-20 NOTE — PROGRESS NOTES
PHQ Scores 3/20/2020 2/26/2019 11/12/2018 12/11/2017 11/9/2017 10/27/2016   PHQ2 Score 0 0 0 0 0 0   PHQ9 Score 0 0 0 0 0 0     Interpretation of Total Score Depression Severity: 1-4 = Minimal depression, 5-9 = Mild depression, 10-14 = Moderate depression, 15-19 = Moderately severe depression, 20-27 = Severe depression      Vaccine Information Sheet, \"Influenza - Inactivated\"  given to Marge Shafer, or parent/legal guardian of  Marge Shafer and verbalized understanding. Patient responses:    Have you ever had a reaction to a flu vaccine? No  Do you have any current illness? No  Have you ever had Guillian [place] Syndrome? No  Do you have a serious allergy to any of the follow: Neomycin, Polymyxin, Thimerosal, eggs or egg products? No    Flu vaccine given per order. Please see immunization tab. Risks and benefits explained. Current VIS given.

## 2020-03-20 NOTE — PROGRESS NOTES
Mackenzie Weaver   Date ofBirth:  1970    Date of Visit:  3/20/2020    Chief Complaint   Patient presents with    Hypertension    Hyperlipidemia    Gastroesophageal Reflux    Anxiety    Other     Prediabetes       HPI  Hypertension- Pt takes Lisinopril-HCTZ 10-12.5mg po q day. Pt decreases salt. Pt is not exercising.     Hyperlipidemia-  Pt takes Fenofibrate 145mg po q day. Pt decreases fat and cholesterol. Pt is not exercising. Prediabetes- Pt states she is terrible about decreasing carbohydrates.     Anxiety- Pt takes Sertraline 25mg po q day. Pt states her anxiety has been ok. Pt denies feeling nervous or jittery. Pt denies panic attacks.     GERD- Pt takes Omeprazole 20mg po q day. Pt denies heartburn and reflux. Pt avoids spicy foods. Pt drinks Diet Pepsi. Pt states she eats tomato based foods sometimes. Allergic Rhinitis- Pt states her allergies are seasonal. Pt uses over the counter Flonase nasal spray and generic Claritin as needed. Pt c/o runny nose, post nasal drainage, and watery eyes. Pt states she had itchy eyes last week that is better. Pt states her right middle finger hurts for 1 month or so. Pain is sharp or achy. Pt states it swells. Pt states she takes Ibuprofen and ices it. Pt states it hurts to make a fist. Pt states she just finished PT for right carpal tunnel mid February. Pt sees [facility], Dr. Nasir Escoto. Review of Systems   Constitutional: Negative for appetite change, chills, fatigue, fever and unexpected weight change. HENT: Positive for postnasal drip and rhinorrhea. Negative for congestion, ear pain, sinus pressure, sinus pain, sneezing, sore throat and trouble swallowing. Eyes: Positive for itching. Negative for visual disturbance. Respiratory: Negative for cough, chest tightness, shortness of breath and wheezing. Cardiovascular: Negative for chest pain, palpitations and leg swelling.    Gastrointestinal: Negative for abdominal pain, blood in stool, constipation, diarrhea, nausea and vomiting. Endocrine: Negative for cold intolerance and heat intolerance. Genitourinary: Negative for dysuria, frequency and hematuria. Musculoskeletal: Positive for arthralgias and joint swelling. Negative for myalgias. Skin: Negative for rash. Neurological: Negative for dizziness, tremors, syncope, weakness, light-headedness, numbness and headaches. Psychiatric/Behavioral: Negative for decreased concentration, dysphoric mood and sleep disturbance. The patient is nervous/anxious. Allergies   Allergen Reactions    Sulfa Antibiotics Rash     Outpatient Medications Marked as Taking for the 3/20/20 encounter (Office Visit) with Galen Gonzalez MD   Medication Sig Dispense Refill    lisinopril-hydrochlorothiazide (PRINZIDE;ZESTORETIC) 10-12.5 MG per tablet TAKE ONE TABLET BY MOUTH DAILY 90 tablet 0    fenofibrate (TRICOR) 145 MG tablet TAKE ONE TABLET BY MOUTH DAILY 90 tablet 0    sertraline (ZOLOFT) 25 MG tablet TAKE ONE TABLET BY MOUTH DAILY 90 tablet 1    Multiple Vitamins-Minerals (THERAPEUTIC MULTIVITAMIN-MINERALS) tablet Take 1 tablet by mouth daily      omeprazole (PRILOSEC) 20 MG capsule Take 1 capsule by mouth Daily 90 capsule 1    fluticasone (FLONASE) 50 MCG/ACT nasal spray 2 sprays by Nasal route daily. 1 Bottle 3         Vitals:    03/20/20 1051   BP: 118/70   Site: Right Upper Arm   Position: Sitting   Cuff Size: Medium Adult   Pulse: 86   SpO2: 98%   Weight: 247 lb (112 kg)   Height: 5' 2\" (1.575 m)     Body mass index is 45.18 kg/m². Physical Exam  Nursing note reviewed. Constitutional:       General: She is not in acute distress. Appearance: Normal appearance. She is well-developed. HENT:      Mouth/Throat:      Pharynx: Oropharynx is clear. Eyes:      General: Lids are normal.      Extraocular Movements: Extraocular movements intact.       Conjunctiva/sclera: Conjunctivae normal.      Pupils: Pupils are equal, round, and

## 2020-03-30 ENCOUNTER — TELEPHONE (OUTPATIENT)
Dept: PRIMARY CARE CLINIC | Age: 50
End: 2020-03-30

## 2020-06-03 DIAGNOSIS — I10 ESSENTIAL HYPERTENSION: ICD-10-CM

## 2020-06-03 DIAGNOSIS — E78.2 MIXED HYPERLIPIDEMIA: ICD-10-CM

## 2020-06-03 DIAGNOSIS — R73.03 PREDIABETES: ICD-10-CM

## 2020-06-03 LAB
A/G RATIO: 1.9 (ref 1.1–2.2)
ALBUMIN SERPL-MCNC: 4.7 G/DL (ref 3.4–5)
ALP BLD-CCNC: 80 U/L (ref 40–129)
ALT SERPL-CCNC: 41 U/L (ref 10–40)
ANION GAP SERPL CALCULATED.3IONS-SCNC: 14 MMOL/L (ref 3–16)
AST SERPL-CCNC: 29 U/L (ref 15–37)
BILIRUB SERPL-MCNC: 0.3 MG/DL (ref 0–1)
BUN BLDV-MCNC: 12 MG/DL (ref 7–20)
CALCIUM SERPL-MCNC: 10.8 MG/DL (ref 8.3–10.6)
CHLORIDE BLD-SCNC: 98 MMOL/L (ref 99–110)
CHOLESTEROL, TOTAL: 165 MG/DL (ref 0–199)
CO2: 26 MMOL/L (ref 21–32)
CREAT SERPL-MCNC: 0.7 MG/DL (ref 0.6–1.1)
GFR AFRICAN AMERICAN: >60
GFR NON-AFRICAN AMERICAN: >60
GLOBULIN: 2.5 G/DL
GLUCOSE BLD-MCNC: 107 MG/DL (ref 70–99)
HDLC SERPL-MCNC: 27 MG/DL (ref 40–60)
LDL CHOLESTEROL CALCULATED: 94 MG/DL
POTASSIUM SERPL-SCNC: 4.5 MMOL/L (ref 3.5–5.1)
SODIUM BLD-SCNC: 138 MMOL/L (ref 136–145)
TOTAL PROTEIN: 7.2 G/DL (ref 6.4–8.2)
TRIGL SERPL-MCNC: 221 MG/DL (ref 0–150)
VLDLC SERPL CALC-MCNC: 44 MG/DL

## 2020-06-04 LAB
ESTIMATED AVERAGE GLUCOSE: 122.6 MG/DL
HBA1C MFR BLD: 5.9 %

## 2020-06-05 ENCOUNTER — TELEPHONE (OUTPATIENT)
Dept: PRIMARY CARE CLINIC | Age: 50
End: 2020-06-05

## 2020-06-05 ENCOUNTER — VIRTUAL VISIT (OUTPATIENT)
Dept: PRIMARY CARE CLINIC | Age: 50
End: 2020-06-05
Payer: COMMERCIAL

## 2020-06-05 ENCOUNTER — NURSE TRIAGE (OUTPATIENT)
Dept: OTHER | Facility: CLINIC | Age: 50
End: 2020-06-05

## 2020-06-05 VITALS — HEIGHT: 62 IN | TEMPERATURE: 97.2 F | WEIGHT: 247 LBS | BODY MASS INDEX: 45.45 KG/M2

## 2020-06-05 DIAGNOSIS — E83.52 HYPERCALCEMIA: ICD-10-CM

## 2020-06-05 DIAGNOSIS — R11.0 NAUSEA: ICD-10-CM

## 2020-06-05 DIAGNOSIS — R10.13 EPIGASTRIC ABDOMINAL PAIN: ICD-10-CM

## 2020-06-05 DIAGNOSIS — R39.9 URINARY TRACT INFECTION SYMPTOMS: ICD-10-CM

## 2020-06-05 LAB
BASOPHILS ABSOLUTE: 0.1 K/UL (ref 0–0.2)
BASOPHILS RELATIVE PERCENT: 1.2 %
BILIRUBIN URINE: NEGATIVE
BLOOD, URINE: NEGATIVE
CALCIUM SERPL-MCNC: 11.3 MG/DL (ref 8.3–10.6)
CLARITY: CLEAR
COLOR: YELLOW
EOSINOPHILS ABSOLUTE: 0.1 K/UL (ref 0–0.6)
EOSINOPHILS RELATIVE PERCENT: 2 %
EPITHELIAL CELLS, UA: 1 /HPF (ref 0–5)
GLUCOSE URINE: NEGATIVE MG/DL
HCT VFR BLD CALC: 39.5 % (ref 36–48)
HEMOGLOBIN: 12.8 G/DL (ref 12–16)
HYALINE CASTS: 1 /LPF (ref 0–8)
KETONES, URINE: NEGATIVE MG/DL
LEUKOCYTE ESTERASE, URINE: ABNORMAL
LIPASE: 60 U/L (ref 13–60)
LYMPHOCYTES ABSOLUTE: 1.7 K/UL (ref 1–5.1)
LYMPHOCYTES RELATIVE PERCENT: 23.8 %
MCH RBC QN AUTO: 26.7 PG (ref 26–34)
MCHC RBC AUTO-ENTMCNC: 32.5 G/DL (ref 31–36)
MCV RBC AUTO: 82.3 FL (ref 80–100)
MICROSCOPIC EXAMINATION: YES
MONOCYTES ABSOLUTE: 0.4 K/UL (ref 0–1.3)
MONOCYTES RELATIVE PERCENT: 6.1 %
NEUTROPHILS ABSOLUTE: 4.8 K/UL (ref 1.7–7.7)
NEUTROPHILS RELATIVE PERCENT: 66.9 %
NITRITE, URINE: NEGATIVE
PDW BLD-RTO: 14.5 % (ref 12.4–15.4)
PH UA: 6.5 (ref 5–8)
PLATELET # BLD: 342 K/UL (ref 135–450)
PMV BLD AUTO: 7.5 FL (ref 5–10.5)
PROTEIN UA: NEGATIVE MG/DL
RBC # BLD: 4.81 M/UL (ref 4–5.2)
RBC UA: 2 /HPF (ref 0–4)
SPECIFIC GRAVITY UA: 1.01 (ref 1–1.03)
URINE TYPE: ABNORMAL
UROBILINOGEN, URINE: 0.2 E.U./DL
WBC # BLD: 7.2 K/UL (ref 4–11)
WBC UA: 2 /HPF (ref 0–5)

## 2020-06-05 PROCEDURE — 99214 OFFICE O/P EST MOD 30 MIN: CPT | Performed by: INTERNAL MEDICINE

## 2020-06-05 RX ORDER — PANTOPRAZOLE SODIUM 40 MG/1
40 TABLET, DELAYED RELEASE ORAL
Qty: 30 TABLET | Refills: 0 | Status: SHIPPED | OUTPATIENT
Start: 2020-06-05 | End: 2020-06-24

## 2020-06-05 RX ORDER — PHENAZOPYRIDINE HYDROCHLORIDE 200 MG/1
200 TABLET, FILM COATED ORAL 3 TIMES DAILY
Qty: 6 TABLET | Refills: 0 | Status: SHIPPED | OUTPATIENT
Start: 2020-06-05 | End: 2020-06-07

## 2020-06-05 RX ORDER — CIPROFLOXACIN 250 MG/1
250 TABLET, FILM COATED ORAL 2 TIMES DAILY
Qty: 14 TABLET | Refills: 0 | Status: SHIPPED | OUTPATIENT
Start: 2020-06-05 | End: 2020-06-12

## 2020-06-05 RX ORDER — PROMETHAZINE HYDROCHLORIDE 25 MG/1
25 TABLET ORAL EVERY 6 HOURS PRN
Qty: 20 TABLET | Refills: 0 | Status: SHIPPED | OUTPATIENT
Start: 2020-06-05 | End: 2022-08-11 | Stop reason: SDUPTHER

## 2020-06-05 ASSESSMENT — PATIENT HEALTH QUESTIONNAIRE - PHQ9
SUM OF ALL RESPONSES TO PHQ9 QUESTIONS 1 & 2: 0
SUM OF ALL RESPONSES TO PHQ QUESTIONS 1-9: 0
2. FEELING DOWN, DEPRESSED OR HOPELESS: 0
1. LITTLE INTEREST OR PLEASURE IN DOING THINGS: 0
SUM OF ALL RESPONSES TO PHQ QUESTIONS 1-9: 0

## 2020-06-05 NOTE — PROGRESS NOTES
blood in stool, constipation and vomiting. Genitourinary: Positive for dysuria and urgency. Negative for decreased urine volume, difficulty urinating, flank pain, frequency and hematuria. Musculoskeletal: Positive for back pain. Negative for arthralgias, gait problem, joint swelling and myalgias. Skin: Negative for rash. Neurological: Negative for dizziness, tremors, syncope, weakness, light-headedness, numbness and headaches. Psychiatric/Behavioral: Negative for dysphoric mood and sleep disturbance. The patient is nervous/anxious (Pt takes Sertraline). Prior to Visit Medications    Medication Sig Taking? Authorizing Provider   lisinopril-hydroCHLOROthiazide (PRINZIDE;ZESTORETIC) 10-12.5 MG per tablet Take 1 tablet by mouth daily Yes Tristan Tatum MD   fenofibrate (TRICOR) 145 MG tablet Take 1 tablet by mouth daily Yes Tristan Tatum MD   sertraline (ZOLOFT) 25 MG tablet TAKE ONE TABLET BY MOUTH DAILY Yes Tristan Tatum MD   Multiple Vitamins-Minerals (THERAPEUTIC MULTIVITAMIN-MINERALS) tablet Take 1 tablet by mouth daily Yes Historical Provider, MD   fluticasone (FLONASE) 50 MCG/ACT nasal spray 2 sprays by Nasal route daily. Patient not taking: Reported on 6/12/2020 Yes Deysi Wahl DO       Social History     Tobacco Use    Smoking status: Never Smoker    Smokeless tobacco: Never Used   Substance Use Topics    Alcohol use:  Yes     Alcohol/week: 0.8 standard drinks     Types: 1 Standard drinks or equivalent per week     Comment: social    Drug use: No        Allergies   Allergen Reactions    Sulfa Antibiotics Rash       PHYSICAL EXAMINATION:  [ INSTRUCTIONS:  \"[x]\" Indicates a positive item  \"[]\" Indicates a negative item  -- DELETE ALL ITEMS NOT EXAMINED]  Vital Signs: (As obtained by patient/caregiver or practitioner observation)    Blood pressure-  Heart rate-    Respiratory rate-    Temperature-  Pulse oximetry-     Vitals:    06/05/20 1101   Temp: 97.2 °F (36.2 °C)

## 2020-06-07 LAB — URINE CULTURE, ROUTINE: NORMAL

## 2020-06-08 ENCOUNTER — HOSPITAL ENCOUNTER (OUTPATIENT)
Dept: ULTRASOUND IMAGING | Age: 50
Discharge: HOME | End: 2020-06-08
Payer: COMMERCIAL

## 2020-06-08 PROCEDURE — 76700 US EXAM ABDOM COMPLETE: CPT

## 2020-06-09 ENCOUNTER — HOSPITAL ENCOUNTER (OUTPATIENT)
Dept: WOMENS IMAGING | Age: 50
Discharge: HOME | End: 2020-06-09
Payer: COMMERCIAL

## 2020-06-09 PROCEDURE — 77067 SCR MAMMO BI INCL CAD: CPT

## 2020-06-12 ENCOUNTER — VIRTUAL VISIT (OUTPATIENT)
Dept: PRIMARY CARE CLINIC | Age: 50
End: 2020-06-12
Payer: COMMERCIAL

## 2020-06-12 ENCOUNTER — TELEPHONE (OUTPATIENT)
Dept: ENDOCRINOLOGY | Age: 50
End: 2020-06-12

## 2020-06-12 PROBLEM — M54.50 LOW BACK PAIN WITHOUT SCIATICA: Status: ACTIVE | Noted: 2020-06-12

## 2020-06-12 PROBLEM — R11.0 NAUSEA: Status: ACTIVE | Noted: 2020-06-12

## 2020-06-12 PROBLEM — E83.52 HYPERCALCEMIA: Status: ACTIVE | Noted: 2020-06-12

## 2020-06-12 PROBLEM — R10.13 EPIGASTRIC ABDOMINAL PAIN: Status: ACTIVE | Noted: 2020-06-12

## 2020-06-12 PROBLEM — R19.7 DIARRHEA: Status: ACTIVE | Noted: 2020-06-12

## 2020-06-12 PROBLEM — R39.9 URINARY TRACT INFECTION SYMPTOMS: Status: ACTIVE | Noted: 2020-06-12

## 2020-06-12 PROCEDURE — 99214 OFFICE O/P EST MOD 30 MIN: CPT | Performed by: INTERNAL MEDICINE

## 2020-06-12 ASSESSMENT — ENCOUNTER SYMPTOMS
RHINORRHEA: 0
TROUBLE SWALLOWING: 0
SORE THROAT: 0
SINUS PRESSURE: 0
BACK PAIN: 1
SHORTNESS OF BREATH: 0
BLOOD IN STOOL: 0
COUGH: 0
DIARRHEA: 1
ABDOMINAL PAIN: 1
VOMITING: 0
CHEST TIGHTNESS: 0
NAUSEA: 1
CONSTIPATION: 0
WHEEZING: 0

## 2020-06-12 NOTE — PROGRESS NOTES
(video visit) encounter to address concerns as mentioned above. A caregiver was present when appropriate. Due to this being a TeleHealth encounter (During [place]-[facility] public health emergency), evaluation of the following organ systems was limited: Vitals/Constitutional/EENT/Resp/CV/GI//MS/Neuro/Skin/Heme-Lymph-Imm. Pursuant to the emergency declaration under the [address] [place] [address] and the [employer] and [employer] Act, this Virtual Visit was conducted with patient's (and/or legal guardian's) consent, to reduce the patient's risk of exposure to COVID-19 and provide necessary medical care. The patient (and/or legal guardian) has also been advised to contact this office for worsening conditions or problems, and seek emergency medical treatment and/or call 911 if deemed necessary. Patient identification was verified at the start of the visit: Yes    Total time spent on this encounter: Not billed by time    Services were provided through a video synchronous discussion virtually to substitute for in-person clinic visit. Patient and provider were located at their individual homes. --Janae Ghosh MD on 6/18/2020 at 8:08 AM    An electronic signature was used to authenticate this note.

## 2020-06-18 ASSESSMENT — ENCOUNTER SYMPTOMS
SINUS PRESSURE: 0
DIARRHEA: 0
SHORTNESS OF BREATH: 0
SINUS PAIN: 0
CHEST TIGHTNESS: 0
SORE THROAT: 0
ABDOMINAL PAIN: 1
WHEEZING: 0
VOMITING: 0
TROUBLE SWALLOWING: 0
COUGH: 0
CONSTIPATION: 0
BLOOD IN STOOL: 0
BACK PAIN: 1
RHINORRHEA: 1
NAUSEA: 1

## 2020-06-24 RX ORDER — PANTOPRAZOLE SODIUM 40 MG/1
TABLET, DELAYED RELEASE ORAL
Qty: 30 TABLET | Refills: 2 | Status: SHIPPED | OUTPATIENT
Start: 2020-06-24 | End: 2020-09-01 | Stop reason: SDUPTHER

## 2020-06-25 DIAGNOSIS — E83.52 HYPERCALCEMIA: ICD-10-CM

## 2020-06-26 LAB — PARATHYROID HORMONE INTACT: 38 PG/ML (ref 14–72)

## 2020-06-29 ENCOUNTER — TELEPHONE (OUTPATIENT)
Dept: PRIMARY CARE CLINIC | Age: 50
End: 2020-06-29

## 2020-07-07 ENCOUNTER — OFFICE VISIT (OUTPATIENT)
Dept: ENDOCRINOLOGY | Age: 50
End: 2020-07-07
Payer: COMMERCIAL

## 2020-07-07 ENCOUNTER — HOSPITAL ENCOUNTER (OUTPATIENT)
Dept: ULTRASOUND IMAGING | Age: 50
Discharge: HOME | End: 2020-07-07
Payer: COMMERCIAL

## 2020-07-07 VITALS
TEMPERATURE: 98.6 F | DIASTOLIC BLOOD PRESSURE: 82 MMHG | HEART RATE: 86 BPM | HEIGHT: 62 IN | WEIGHT: 248.8 LBS | BODY MASS INDEX: 45.78 KG/M2 | SYSTOLIC BLOOD PRESSURE: 133 MMHG | OXYGEN SATURATION: 97 %

## 2020-07-07 PROBLEM — E66.01 CLASS 3 SEVERE OBESITY WITH BODY MASS INDEX (BMI) OF 45.0 TO 49.9 IN ADULT (HCC): Status: ACTIVE | Noted: 2020-07-07

## 2020-07-07 PROBLEM — E04.9 GOITER: Status: ACTIVE | Noted: 2020-07-07

## 2020-07-07 PROBLEM — E21.0 HYPERPARATHYROIDISM, PRIMARY (HCC): Status: ACTIVE | Noted: 2020-07-07

## 2020-07-07 PROBLEM — E28.319 PREMATURE MENOPAUSE: Status: ACTIVE | Noted: 2020-07-07

## 2020-07-07 PROBLEM — E66.813 CLASS 3 SEVERE OBESITY WITH BODY MASS INDEX (BMI) OF 45.0 TO 49.9 IN ADULT: Status: ACTIVE | Noted: 2020-07-07

## 2020-07-07 PROCEDURE — 99244 OFF/OP CNSLTJ NEW/EST MOD 40: CPT | Performed by: INTERNAL MEDICINE

## 2020-07-07 PROCEDURE — 76536 US EXAM OF HEAD AND NECK: CPT

## 2020-07-07 NOTE — PROGRESS NOTES
Hypertriglyceridemia 12/17/2015    HTN (hypertension) 07/01/2015     Past Surgical History:   Procedure Laterality Date    ACHILLES TENDON SURGERY Right 6/24/2019    RIGHT ACHILLES TENDON RELEASE AND HEEL EXOSTECTOMY performed by Mahad Dominguez DPM at [address]  2004   [address] COLONOSCOPY  03/18/2015    Brien GOMEZ    FOOT SURGERY Left 2013    left foot recontstructon    HYSTERECTOMY  2013    complete - vaginal hyst    TUBAL LIGATION  2013    with ablation     Family History   Problem Relation Age of Onset    Diabetes Mother     High Blood Pressure Mother     Stroke Mother     High Blood Pressure Father     Cancer Maternal Aunt 58        breast    Cancer Maternal Grandmother 72        colon cancer    Cancer Paternal Grandmother         lung     Social History     Socioeconomic History    Marital status:      Spouse name: None    Number of children: None    Years of education: None    Highest education level: None   Occupational History    None   Social Needs    Financial resource strain: None    Food insecurity     Worry: None     Inability: None    Transportation needs     Medical: None     Non-medical: None   Tobacco Use    Smoking status: Never Smoker    Smokeless tobacco: Never Used   Substance and Sexual Activity    Alcohol use:  Yes     Alcohol/week: 0.8 standard drinks     Types: 1 Standard drinks or equivalent per week     Comment: social    Drug use: No    Sexual activity: Yes     Partners: Male   Lifestyle    Physical activity     Days per week: None     Minutes per session: None    Stress: None   Relationships    Social connections     Talks on phone: None     Gets together: None     Attends Restoration service: None     Active member of club or organization: None     Attends meetings of clubs or organizations: None     Relationship status: None    Intimate partner violence     Fear of current or ex partner: None     Emotionally abused: None     Physically abused: None     Forced sexual activity: None   Other Topics Concern    None   Social History Narrative    None     Current Outpatient Medications   Medication Sig Dispense Refill    Acetaminophen (TYLENOL ARTHRITIS PAIN PO) Take by mouth      pantoprazole (PROTONIX) 40 MG tablet TAKE ONE TABLET BY MOUTH EVERY MORNING BEFORE BREAKFAST 30 tablet 2    promethazine (PHENERGAN) 25 MG tablet Take 1 tablet by mouth every 6 hours as needed for Nausea 20 tablet 0    lisinopril-hydroCHLOROthiazide (PRINZIDE;ZESTORETIC) 10-12.5 MG per tablet Take 1 tablet by mouth daily 90 tablet 1    fenofibrate (TRICOR) 145 MG tablet Take 1 tablet by mouth daily 90 tablet 1    sertraline (ZOLOFT) 25 MG tablet TAKE ONE TABLET BY MOUTH DAILY 90 tablet 1    Multiple Vitamins-Minerals (THERAPEUTIC MULTIVITAMIN-MINERALS) tablet Take 1 tablet by mouth daily      fluticasone (FLONASE) 50 MCG/ACT nasal spray 2 sprays by Nasal route daily. 1 Bottle 3     No current facility-administered medications for this visit.       Allergies   Allergen Reactions    Sulfa Antibiotics Rash     Family Status   Relation Name Status    Mother  Alive    Father  Alive    [address]  (Not Specified)    MGM  (Not Specified)    Sister  Alive    Brother  Alive    PGM  (Not Specified)   Green Sister   at age 32        Down Syndrome       Review of Systems:  Constitutional: has fatigue, no fever, no recent weight gain, no recent weight loss, no changes in appetite  Eyes: no eye pain, no change in vision, no eye redness, no eye irritation, no double vision  Ears, nose, throat: no nasal congestion, no sore throat, no earache, no decrease in hearing, no hoarseness, no dry mouth, no sinus problems, no difficulty swallowing, no neck lumps, no dental problems, no mouth sores, no ringing in ears  Pulmonary: no shortness of breath, no wheezing, no dyspnea on exertion, no cough  Cardiovascular: no chest pain, no lower extremity edema, no orthopnea, no intermittent leg claudication, no palpitations  Gastrointestinal: has abdominal pain, no nausea, no vomiting, has diarrhea, has constipation, no dysphagia, has heartburn, no bloating  Genitourinary: no dysuria, no urinary incontinence, no urinary hesitancy, no urinary frequency, no feelings of urinary urgency, no nocturia  Musculoskeletal: no joint swelling, no joint stiffness, has joint pain, has muscle cramps, no muscle pain, no bone pain  Integument/Breast: no hair loss, no skin rashes, no skin lesions, no itching, no dry skin  Neurological: no numbness, no tingling, no weakness, no confusion, no headaches, no dizziness, no fainting, no tremors, no decrease in memory, no balance problems  Psychiatric: no anxiety, no depression, no insomnia  Hematologic/Lymphatic: no tendency for easy bleeding, no swollen lymph nodes, no tendency for easy bruising  Immunology: has seasonal allergies, no frequent infections, no frequent illnesses  Endocrine: has temperature intolerance    /82 (Site: Left Upper Arm, Position: Sitting, Cuff Size: Large Adult)   Pulse 86   Temp 98.6 °F (37 °C) (Infrared)   Ht 5' 2\" (1.575 m)   Wt 248 lb 12.8 oz (112.9 kg)   SpO2 97%   BMI 45.51 kg/m²    Wt Readings from Last 3 Encounters:   07/07/20 248 lb 12.8 oz (112.9 kg)   06/05/20 247 lb (112 kg)   03/20/20 247 lb (112 kg)     Body mass index is 45.51 kg/m².     OBJECTIVE:  Constitutional: no acute distress, well appearing and well nourished  Psychiatric: oriented to person, place and time, judgement and insight and normal, recent and remote memory and intact and mood and affect are normal  Skin: skin and subcutaneous tissue is normal without mass, normal turgor  Head and Face: examination of head and face revealed no abnormalities  Eyes: no lid or conjunctival swelling, erythema or discharge, pupils are normal, equal, round, reactive to light  Ears/Nose: external inspection of ears and nose revealed no abnormalities, hearing is grossly normal  Oropharynx/Mouth/Face: lips, tongue and gums are normal with no lesions, the voice quality was normal  Neck: neck is supple and symmetric, with midline trachea and no masses, thyroid is enlarged  Lymphatics: normal cervical lymph nodes, normal supraclavicular nodes  Pulmonary: no increased work of breathing or signs of respiratory distress, lungs are clear to auscultation  Cardiovascular: normal heart rate and rhythm, normal S1 and S2, no murmurs and pedal pulses and 2+ bilaterally, No edema  Abdomen: abdomen is soft, non-tender with no masses  Musculoskeletal: normal gait and station and exam of the digits and nails are normal  Neurological: normal coordination and normal general cortical function      Lab Review:    Lab Results   Component Value Date    WBC 7.2 06/05/2020    HGB 12.8 06/05/2020    HCT 39.5 06/05/2020    MCV 82.3 06/05/2020     06/05/2020     Lab Results   Component Value Date     06/03/2020    K 4.5 06/03/2020    CL 98 06/03/2020    CO2 26 06/03/2020    BUN 12 06/03/2020    CREATININE 0.7 06/03/2020    GLUCOSE 107 06/03/2020    CALCIUM 11.3 06/05/2020    PROT 7.2 06/03/2020    LABALBU 4.7 06/03/2020    BILITOT 0.3 06/03/2020    ALKPHOS 80 06/03/2020    AST 29 06/03/2020    ALT 41 06/03/2020    LABGLOM >60 06/03/2020    GFRAA >60 06/03/2020    AGRATIO 1.9 06/03/2020    GLOB 2.5 06/03/2020     Lab Results   Component Value Date    TSH 1.44 02/27/2019     Lab Results   Component Value Date    LABA1C 5.9 06/03/2020     Lab Results   Component Value Date    .6 06/03/2020     Lab Results   Component Value Date    CHOL 165 06/03/2020     Lab Results   Component Value Date    TRIG 221 06/03/2020     Lab Results   Component Value Date    HDL 27 06/03/2020     Lab Results   Component Value Date    LDLCALC 94 06/03/2020     Lab Results   Component Value Date    LABVLDL 44 06/03/2020     No results found for: [facility]  Lab Results Component Value Date    LABMICR YES 06/05/2020     Lab Results   Component Value Date    VITD25 31 01/09/2015        ASSESSMENT/PLAN/recommendations:  1. Hypercalcemia  2. Hyperparathyroidism, primary   Evaluate when results are available. - Creatinine Clearance, Urine, 24 HR; Future  - Cortisol, Urine, Free; Future  - Calcium, 24 HR Urine; Future  - Sodium, urine, 24 hour; Future  - Vitamin D 25 Hydroxy; Future  - Calcium Ionized Serum; Future  - PTH, Intact; Future  - Phosphorus; Future  - Vitamin D 1,25 Dihydroxy; Future  - Electrophoresis Protein, Serum without Reflex to Immunofixation; Future  - Immunofixation serum profile; Future    3. Goiter    - T4, Free; Future  - TSH without Reflex; Future  - Anti-Thyroglobulin Antibody; Future  - Thyroid Peroxidase Antibody; Future  - US Head Neck Soft Tissue Thyroid; Future    4.  Class 3 severe obesity with body mass index (BMI) of 45.0 to 49.9 in adult, unspecified obesity type, unspecified whether serious comorbidity present (HCC)  Diet and exercise  Since age 38.    11. Premature menopause  Calcium and vitamin D supplementation  DEXA scan      Reviewed and/or ordered clinical lab results Yes  Reviewed and/or ordered radiology tests Yes   Reviewed and/or ordered other diagnostic tests No  Discussed test results with performing physician No  Independently reviewed image, tracing, or specimen No  Made a decision to obtain old records No  Reviewed and summarized old records Yes   Calcium elevated since 2015, later upper limit of normal, in 2020 elevated again  PTH 38  Calcium 11.1-10.5-10.2-10.1-10.8-11.3, upper limit normal 10.6  TSH 1.4  Obtained history from other than patient No    Mackenzie Orta was counseled regarding symptoms of hypercalcemia, causes of hypercalcemia including primary hyperparathyroidism, goiter, premature menopause diagnosis, course and complications of disease if inadequately treated, side effects of medications, diagnosis, treatment options, and prognosis, risks, benefits, complications, and alternatives of treatment, labs, imaging and other studies and treatment targets and goals, differential diagnosis of hypercalcemia, indications for parathyroid surgery, follow-up. She understands instructions and counseling. Return in about 6 weeks (around 8/18/2020) for parathyroid disease.     Electronically signed by Marlon Christianson MD on 7/7/2020 at 11:05 PM

## 2020-07-12 ENCOUNTER — TELEPHONE (OUTPATIENT)
Dept: ENDOCRINOLOGY | Age: 50
End: 2020-07-12

## 2020-07-12 NOTE — TELEPHONE ENCOUNTER
Please inform patient that her thyroid ultrasound showed right 2.1 cm and left 1.2 cm nodules. These are thyroid, not parathyroid nodules. They will need to be biopsied. We can discuss this during the appointment, or I can refer for biopsy prior to appointment. Let me know.

## 2020-07-14 NOTE — TELEPHONE ENCOUNTER
FYI  ordered FNA of the right 2.1 cm and left 1.2 cm nodules. Left nodule is described as having irregular borders and calcification.

## 2020-07-21 DIAGNOSIS — E83.52 HYPERCALCEMIA: ICD-10-CM

## 2020-07-21 DIAGNOSIS — E04.9 GOITER: ICD-10-CM

## 2020-07-21 DIAGNOSIS — E21.0 HYPERPARATHYROIDISM, PRIMARY (HCC): ICD-10-CM

## 2020-07-21 LAB
ANTI-THYROGLOB ABS: 15 IU/ML
PARATHYROID HORMONE INTACT: 44.9 PG/ML (ref 14–72)
PHOSPHORUS: 2.8 MG/DL (ref 2.5–4.9)
T4 FREE: 1.5 NG/DL (ref 0.9–1.8)
THYROID PEROXIDASE (TPO) ABS: 46 IU/ML
TSH SERPL DL<=0.05 MIU/L-ACNC: 1.66 UIU/ML (ref 0.27–4.2)
VITAMIN D 25-HYDROXY: 42.2 NG/ML

## 2020-07-22 DIAGNOSIS — E83.52 HYPERCALCEMIA: ICD-10-CM

## 2020-07-22 DIAGNOSIS — E04.9 GOITER: ICD-10-CM

## 2020-07-22 DIAGNOSIS — E21.0 HYPERPARATHYROIDISM, PRIMARY (HCC): ICD-10-CM

## 2020-07-22 LAB
24HR URINE VOLUME (ML): 2500 ML
ALBUMIN SERPL-MCNC: 3.5 G/DL (ref 3.1–4.9)
ALPHA-1-GLOBULIN: 0.3 G/DL (ref 0.2–0.4)
ALPHA-2-GLOBULIN: 0.9 G/DL (ref 0.4–1.1)
BETA GLOBULIN: 1.2 G/DL (ref 0.9–1.6)
CALCIUM 24 HOUR URINE: 218 MG/24 HR (ref 42–353)
CREAT SERPL-MCNC: 0.7 MG/DL (ref 0.6–1.2)
CREATININE 24 HOUR URINE: 1.2 G/24HR (ref 0.6–1.5)
CREATININE CLEARANCE: 95 ML/MIN (ref 88–128)
GAMMA GLOBULIN: 1.1 G/DL (ref 0.6–1.8)
Lab: 24 HR
SODIUM 24 HOUR URINE: 105 MMOL/24 HR (ref 40–220)
SPE/IFE INTERPRETATION: NORMAL
TOTAL PROTEIN: 6.9 G/DL (ref 6.4–8.2)

## 2020-07-23 LAB
CALCIUM IONIZED, CALC AT PH 7.4: 1.36 MMOL/L (ref 1.11–1.3)
CALCIUM IONIZED: 1.34 MMOL/L (ref 1.11–1.3)
VITAMIN D 1,25-DIHYDROXY: 68.6 PG/ML (ref 19.9–79.3)

## 2020-07-25 ENCOUNTER — TELEPHONE (OUTPATIENT)
Dept: ENDOCRINOLOGY | Age: 50
End: 2020-07-25

## 2020-07-26 LAB
ALBUMIN SERPL-MCNC: 4.3 G/DL (ref 3.4–5)
CALCIUM SERPL-MCNC: 10.2 MG/DL (ref 8.3–10.6)
CORTISOL (UR), FREE: 6.7 UG/D
CORTISOL URINE, FREE (/G CRT): 5.98 UG/G CRT
CORTISOL,F,UG/L,U: 2.69 UG/L
CREATININE 24 HOUR URINE: 1125 MG/D (ref 700–1600)
CREATININE URINE: 45 MG/DL
HOURS COLLECTED: 24
INTERPRETATION: NORMAL
URINE TOTAL VOLUME: 2500

## 2020-07-27 ENCOUNTER — TELEPHONE (OUTPATIENT)
Dept: PRIMARY CARE CLINIC | Age: 50
End: 2020-07-27

## 2020-07-27 NOTE — TELEPHONE ENCOUNTER
----- Message from Flaco Arana sent at 7/24/2020  8:31 AM EDT -----  Subject: Appointment Request    QUESTIONS  Reason for appointment request? No appointments available during search  ---------------------------------------------------------------------------  --------------  CALL BACK INFO  What is the best way for the office to contact you? OK to leave message on   voicemail  Preferred Call Back Phone Number? [phone]  ---------------------------------------------------------------------------  --------------  SCRIPT ANSWERS  Relationship to Patient? Self  Have your symptoms changed? No  Appointment reason? Well Care/Follow Ups  Select a Well Care/Follow Ups appointment reason? Adult Physical Exam   [Medicare Annual Wellness   AWV   PAP   Pelvic]  (Is the patient requesting to be seen urgently for their symptoms?)? No  (Is the patient requesting to be seen urgently for their symptoms?)? No  (If the patient is female   ask this question) Are you requesting a pap smear with your physical   exam? Yes  (Patient is Medicare and requesting Annual Wellness Visit)? No  Have you been diagnosed with   tested for   or told that you are suspected of having COVID-19 (Coronavirus)? Yes  Did your symptoms begin or have you been tested for COVID-19 in the last   10 days?  Yes

## 2020-07-31 ENCOUNTER — HOSPITAL ENCOUNTER (OUTPATIENT)
Dept: ULTRASOUND IMAGING | Age: 50
Discharge: HOME | End: 2020-07-31
Payer: COMMERCIAL

## 2020-07-31 ENCOUNTER — HOSPITAL ENCOUNTER (OUTPATIENT)
Dept: GENERAL RADIOLOGY | Age: 50
Discharge: HOME | End: 2020-07-31
Payer: COMMERCIAL

## 2020-07-31 VITALS
HEIGHT: 62 IN | BODY MASS INDEX: 45.82 KG/M2 | HEART RATE: 79 BPM | WEIGHT: 249 LBS | TEMPERATURE: 96.9 F | RESPIRATION RATE: 16 BRPM | OXYGEN SATURATION: 99 % | DIASTOLIC BLOOD PRESSURE: 78 MMHG | SYSTOLIC BLOOD PRESSURE: 128 MMHG

## 2020-07-31 PROCEDURE — 10006 FNA BX W/US GDN EA ADDL: CPT

## 2020-07-31 PROCEDURE — 2500000003 HC RX 250 WO HCPCS: Performed by: INTERNAL MEDICINE

## 2020-07-31 PROCEDURE — 60100 BIOPSY OF THYROID: CPT

## 2020-07-31 PROCEDURE — 88173 CYTOPATH EVAL FNA REPORT: CPT

## 2020-07-31 PROCEDURE — 77080 DXA BONE DENSITY AXIAL: CPT

## 2020-07-31 PROCEDURE — 88305 TISSUE EXAM BY PATHOLOGIST: CPT

## 2020-07-31 PROCEDURE — 6370000000 HC RX 637 (ALT 250 FOR IP): Performed by: INTERNAL MEDICINE

## 2020-07-31 RX ORDER — BACITRACIN, NEOMYCIN, POLYMYXIN B 400; 3.5; 5 [USP'U]/G; MG/G; [USP'U]/G
OINTMENT TOPICAL ONCE
Status: COMPLETED | OUTPATIENT
Start: 2020-07-31 | End: 2020-07-31

## 2020-07-31 RX ORDER — LIDOCAINE HYDROCHLORIDE 10 MG/ML
5 INJECTION, SOLUTION EPIDURAL; INFILTRATION; INTRACAUDAL; PERINEURAL ONCE
Status: COMPLETED | OUTPATIENT
Start: 2020-07-31 | End: 2020-07-31

## 2020-07-31 RX ADMIN — LIDOCAINE HYDROCHLORIDE 5 ML: 10 INJECTION, SOLUTION EPIDURAL; INFILTRATION; INTRACAUDAL; PERINEURAL at 11:00

## 2020-07-31 RX ADMIN — BACITRACIN ZINC, NEOMYCIN SULFATE, POLYMYXIN B SULFATE: 3.5; 5000; 4 OINTMENT TOPICAL at 11:25

## 2020-07-31 NOTE — BRIEF OP NOTE
Brief Postoperative Note    Mackenzie Conley  YOB: 1970  [id number]    Pre-operative Diagnosis: bilateral thyroid nodules    Post-operative Diagnosis: Same    Procedure: bilateral thyroid nodule FNA    Anesthesia: Local    Surgeons: Deepak Sanchez MD    Estimated Blood Loss: Less than 5 mL    Complications: None    Specimens: Was Obtained: 3 FNA specimens from each nodule    Findings: Successful US guided bilateral thyroid nodule FNA.     Electronically signed by Deepak Sanchez MD on 7/31/2020 at 11:23 AM

## 2020-08-01 PROBLEM — M85.80 OSTEOPENIA: Status: ACTIVE | Noted: 2020-08-01

## 2020-08-06 ENCOUNTER — TELEPHONE (OUTPATIENT)
Dept: ENDOCRINOLOGY | Age: 50
End: 2020-08-06

## 2020-08-06 ENCOUNTER — OFFICE VISIT (OUTPATIENT)
Dept: ENDOCRINOLOGY | Age: 50
End: 2020-08-06
Payer: COMMERCIAL

## 2020-08-06 VITALS
TEMPERATURE: 98.1 F | WEIGHT: 250.4 LBS | OXYGEN SATURATION: 96 % | BODY MASS INDEX: 46.08 KG/M2 | HEART RATE: 83 BPM | SYSTOLIC BLOOD PRESSURE: 133 MMHG | HEIGHT: 62 IN | DIASTOLIC BLOOD PRESSURE: 87 MMHG

## 2020-08-06 PROBLEM — E06.3 HASHIMOTO'S THYROIDITIS: Status: ACTIVE | Noted: 2020-08-06

## 2020-08-06 PROBLEM — E04.2 MULTINODULAR GOITER: Status: ACTIVE | Noted: 2020-08-06

## 2020-08-06 PROCEDURE — 99215 OFFICE O/P EST HI 40 MIN: CPT | Performed by: INTERNAL MEDICINE
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
## 2020-08-07 NOTE — TELEPHONE ENCOUNTER
Please obtain form from [facility] for me to sign to send patient's thyroid biopsy samples for afirma testing. Thank you.

## 2020-08-11 NOTE — TELEPHONE ENCOUNTER
I spoke to the pathology department today and they will refax the form to complete for Afirma. We will then fax form back and the pathology department will handle the rest to send to [address] [place] [address].

## 2020-08-13 ENCOUNTER — HOSPITAL ENCOUNTER (OUTPATIENT)
Dept: NUCLEAR MEDICINE | Age: 50
Discharge: HOME | End: 2020-08-13
Payer: COMMERCIAL

## 2020-08-13 PROCEDURE — 78072 PARATHYRD PLANAR W/SPECT&CT: CPT

## 2020-08-13 PROCEDURE — 3430000000 HC RX DIAGNOSTIC RADIOPHARMACEUTICAL: Performed by: INTERNAL MEDICINE

## 2020-08-13 PROCEDURE — A9500 TC99M SESTAMIBI: HCPCS | Performed by: INTERNAL MEDICINE

## 2020-08-13 RX ADMIN — Medication 20.76 MILLICURIE: at 11:32

## 2020-08-15 ENCOUNTER — TELEPHONE (OUTPATIENT)
Dept: ENDOCRINOLOGY | Age: 50
End: 2020-08-15

## 2020-08-16 NOTE — TELEPHONE ENCOUNTER
I discussed sestamibi results with patient. She has an appointment with Dr. Mikel Diego at the end of September.

## 2020-08-23 ENCOUNTER — TELEPHONE (OUTPATIENT)
Dept: ENDOCRINOLOGY | Age: 50
End: 2020-08-23

## 2020-08-24 NOTE — TELEPHONE ENCOUNTER
Please call [facility] to obtain [facility] results. My request was faxed on 8/7/2020. If they do not have it yet, please ask when she expect. See messages.

## 2020-08-26 NOTE — TELEPHONE ENCOUNTER
Patient has suspicious biopsy results. Please schedule appointment on Friday, only one opening I can see is 9:50. I do not see any openings on Wednesday or Thursday. When calling patient, let her know that I would like to discuss her biopsy results in person. Patient already has an appointment with Dr. Carole Almanza for parathyroid, I will discuss in details during her appointment to address both problems with him.

## 2020-08-27 NOTE — TELEPHONE ENCOUNTER
Called patient yesterday to set up appt. LMOM for her to call back and confirm that she could make the Friday appt. I went ahead and scheduled in order to hold the spot, she just needs to confirm that she can make it.

## 2020-08-27 NOTE — TELEPHONE ENCOUNTER
PT cannot make the appt and has canceled it.  She stated she has to work till 1:45pm. She is requesting a call back [phone]

## 2020-09-01 ENCOUNTER — OFFICE VISIT (OUTPATIENT)
Dept: PRIMARY CARE CLINIC | Age: 50
End: 2020-09-01
Payer: COMMERCIAL

## 2020-09-01 VITALS
SYSTOLIC BLOOD PRESSURE: 132 MMHG | HEART RATE: 107 BPM | HEIGHT: 62 IN | OXYGEN SATURATION: 96 % | WEIGHT: 248.2 LBS | RESPIRATION RATE: 18 BRPM | BODY MASS INDEX: 45.67 KG/M2 | TEMPERATURE: 98.2 F | DIASTOLIC BLOOD PRESSURE: 86 MMHG

## 2020-09-01 LAB
BILIRUBIN, POC: NORMAL
BLOOD URINE, POC: NORMAL
CLARITY, POC: CLEAR
COLOR, POC: YELLOW
GLUCOSE URINE, POC: NORMAL
KETONES, POC: NORMAL
LEUKOCYTE EST, POC: NORMAL
NITRITE, POC: NORMAL
PH, POC: 5.5
PROTEIN, POC: NORMAL
SPECIFIC GRAVITY, POC: 1.01
UROBILINOGEN, POC: 0.2

## 2020-09-01 PROCEDURE — 81002 URINALYSIS NONAUTO W/O SCOPE: CPT | Performed by: INTERNAL MEDICINE

## 2020-09-01 PROCEDURE — 99396 PREV VISIT EST AGE 40-64: CPT | Performed by: INTERNAL MEDICINE

## 2020-09-01 RX ORDER — PANTOPRAZOLE SODIUM 40 MG/1
40 TABLET, DELAYED RELEASE ORAL 2 TIMES DAILY
Qty: 180 TABLET | Refills: 1 | Status: SHIPPED | OUTPATIENT
Start: 2020-09-01 | End: 2020-10-01

## 2020-09-01 NOTE — PATIENT INSTRUCTIONS
-Continue same medications  -Low sodium diet  -Low fat, low cholesterol diet  -low carbohydrate diet  -Regular aerobic exercise    Patient Education        Recombinant Zoster (Shingles) Vaccine: What You Need to Know  Why get vaccinated? Recombinant zoster (shingles) vaccine can prevent shingles. Shingles (also called herpes zoster, or just zoster) is a painful skin rash, usually with blisters. In addition to the rash, shingles can cause fever, headache, chills, or upset stomach. More rarely, shingles can lead to pneumonia, hearing problems, blindness, brain inflammation (encephalitis), or death. The most common complication of shingles is long-term nerve pain called postherpetic neuralgia (PHN). PHN occurs in the areas where the shingles rash was, even after the rash clears up. It can last for months or years after the rash goes away. The pain from PHN can be severe and debilitating. About 10 to 18% of people who get shingles will experience PHN. The risk of PHN increases with age. An older adult with shingles is more likely to develop PHN and have longer lasting and more severe pain than a younger person with shingles. Shingles is caused by the varicella zoster virus, the same virus that causes chickenpox. After you have chickenpox, the virus stays in your body and can cause shingles later in life. Shingles cannot be passed from one person to another, but the virus that causes shingles can spread and cause chickenpox in someone who had never had chickenpox or received chickenpox vaccine. Recombinant shingles vaccine  Recombinant shingles vaccine provides strong protection against shingles. By preventing shingles, recombinant shingles vaccine also protects against PHN. Recombinant shingles vaccine is the preferred vaccine for the prevention of shingles. However, a different vaccine, live shingles vaccine, may be used in some circumstances.   The recombinant shingles vaccine is recommended for adults 50 years and older without serious immune problems. It is given as a two-dose series. This vaccine is also recommended for people who have already gotten another type of shingles vaccine, the live shingles vaccine. There is no live virus in this vaccine. Shingles vaccine may be given at the same time as other vaccines. Talk with your health care provider  Tell your vaccine provider if the person getting the vaccine:  · Has had an allergic reaction after a previous dose of recombinant shingles vaccine, or has any severe, life-threatening allergies. · Is pregnant or breastfeeding. · Is currently experiencing an episode of shingles. In some cases, your health care provider may decide to postpone shingles vaccination to a future visit. People with minor illnesses, such as a cold, may be vaccinated. People who are moderately or severely ill should usually wait until they recover before getting recombinant shingles vaccine. Your health care provider can give you more information. Risks of a vaccine reaction  · A sore arm with mild or moderate pain is very common after recombinant shingles vaccine, affecting about 80% of vaccinated people. Redness and swelling can also happen at the site of the injection. · Tiredness, muscle pain, headache, shivering, fever, stomach pain, and nausea happen after vaccination in more than half of people who receive recombinant shingles vaccine. In clinical trials, about 1 out of 6 people who got recombinant zoster vaccine experienced side effects that prevented them from doing regular activities. Symptoms usually went away on their own in 2 to 3 days. You should still get the second dose of recombinant zoster vaccine even if you had one of these reactions after the first dose. People sometimes faint after medical procedures, including vaccination. Tell your provider if you feel dizzy or have vision changes or ringing in the ears.   As with any medicine, there is a very remote chance of a vaccine causing a severe allergic reaction, other serious injury, or death. What if there is a serious problem? An allergic reaction could occur after the vaccinated person leaves the clinic. If you see signs of a severe allergic reaction (hives, swelling of the face and throat, difficulty breathing, a fast heartbeat, dizziness, or weakness), call 9-1-1 and get the person to the nearest hospital.  For other signs that concern you, call your health care provider. Adverse reactions should be reported to the Vaccine Adverse Event Reporting System (VAERS). Your health care provider will usually file this report, or you can do it yourself. Visit the VAERS website at www.vaers. [facility]gov or call 0-888.562.9433. VAERS is only for reporting reactions, and VAERS staff do not give medical advice. How can I learn more? · Ask your health care provider. · Call your local or state health department. · Contact the Centers for Disease Control and Prevention (CDC):  ? Call 4-182.912.5306 (1-800-CDC-INFO) or  ? Visit CDC's website at www.cdc.gov/vaccines  Vaccine Information Statement  Recombinant Zoster Vaccine  10/30/2019  [facility] and [facility] for Disease Control and Prevention  Many Vaccine Information Statements are available in Syriac and other languages. See www.immunize.org/vis. Hojas de Información Sobre Vacunas están disponibles en Español y en muchos otros idiomas. Visite Suzy.si   Care instructions adapted under license by [facility] ([facility]). If you have questions about a medical condition or this instruction, always ask your healthcare professional. [place] [address] any warranty or liability for your use of this information. Patient Education        Well Visit, Women 48 to 72: Care Instructions  Your Care Instructions     Physical exams can help you stay healthy.  Your doctor has checked your overall health and may have suggested ways to take good care of yourself. He or she also may have recommended tests. At home, you can help prevent illness with healthy eating, regular exercise, and other steps. Follow-up care is a key part of your treatment and safety. Be sure to make and go to all appointments, and call your doctor if you are having problems. It's also a good idea to know your test results and keep a list of the medicines you take. How can you care for yourself at home? · Reach and stay at a healthy weight. This will lower your risk for many problems, such as obesity, diabetes, heart disease, and high blood pressure. · Get at least 30 minutes of exercise on most days of the week. Walking is a good choice. You also may want to do other activities, such as running, swimming, cycling, or playing tennis or team sports. · Do not smoke. Smoking can make health problems worse. If you need help quitting, talk to your doctor about stop-smoking programs and medicines. These can increase your chances of quitting for good. · Protect your skin from too much sun. When you're outdoors from 10 a.m. to 4 p.m., stay in the shade or cover up with clothing and a hat with a wide brim. Wear sunglasses that block UV rays. Even when it's cloudy, put broad-spectrum sunscreen (SPF 30 or higher) on any exposed skin. · See a dentist one or two times a year for checkups and to have your teeth cleaned. · Wear a seat belt in the car. Follow your doctor's advice about when to have certain tests. These tests can spot problems early. · Cholesterol. Your doctor will tell you how often to have this done based on your age, family history, or other things that can increase your risk for heart attack and stroke. · Blood pressure. Have your blood pressure checked during a routine doctor visit. Your doctor will tell you how often to check your blood pressure based on your age, your blood pressure results, and other factors.   · Mammogram. Ask your doctor how often you should have a mammogram, which is an X-ray of your breasts. A mammogram can spot breast cancer before it can be felt and when it is easiest to treat. · Pap test and pelvic exam. Ask your doctor how often you should have a Pap test. You may not need to have a Pap test as often as you used to. · Vision. Have your eyes checked every year or two or as often as your doctor suggests. Some experts recommend that you have yearly exams for glaucoma and other age-related eye problems starting at age 48. · Hearing. Tell your doctor if you notice any change in your hearing. You can have tests to find out how well you hear. · Diabetes. Ask your doctor whether you should have tests for diabetes. · Colorectal cancer. Your risk for colorectal cancer gets higher as you get older. Some experts say that adults should start regular screening at age 48 and stop at age 76. Others say to start before age 48 or continue after age 76. Talk with your doctor about your risk and when to start and stop screening. · Thyroid disease. Talk to your doctor about whether to have your thyroid checked as part of a regular physical exam. Women have an increased chance of a thyroid problem. · Osteoporosis. You should begin tests for bone density at age 72. If you are younger than 72, ask your doctor whether you have factors that may increase your risk for this disease. You may want to have this test before age 72. · Heart attack and stroke risk. At least every 4 to 6 years, you should have your risk for heart attack and stroke assessed. Your doctor uses factors such as your age, blood pressure, cholesterol, and whether you smoke or have diabetes to show what your risk for a heart attack or stroke is over the next 10 years. When should you call for help? Watch closely for changes in your health, and be sure to contact your doctor if you have any problems or symptoms that concern you. Where can you learn more? Go to https://ally.health-partners. org and sign in to your [employer] account. Enter I362 in the [place] box to learn more about \"Well Visit, Women 50 to 72: Care Instructions. \"     If you do not have an account, please click on the \"Sign Up Now\" link. Current as of: August 22, 2019               Content Version: 12.5  © 2743-2000 Healthwise, Incorporated. Care instructions adapted under license by [facility] ([facility]). If you have questions about a medical condition or this instruction, always ask your healthcare professional. [address] any warranty or liability for your use of this information.

## 2020-09-01 NOTE — PROGRESS NOTES
Mackenzie Woods   YOB: 1970    Date of Visit:  9/1/2020    Chief Complaint   Patient presents with    Annual Exam       HPI  Pt presents for annual physical exam and pap smear. Mammogram done on 6/9/20. Colonoscopy done on 3/18/15. Hypertension- Pt takes Lisinopril-HCTZ 10-12.5mg po q day. Pt decreases salt. Pt is not exercising.     Hyperlipidemia-  Pt takes Fenofibrate 145mg po q day. Pt decreases fat and cholesterol. Pt is not exercising.     Prediabetes- Pt states she is terrible about decreasing carbohydrates.     GERD- Pt takes Protonix 40mg po bid. Pt states GI increased Protonix to bid. Pt denies heartburn and reflux. Pt avoids spicy foods. Pt eats tomato based foods sometimes.     Allergic Rhinitis- Pt takes Flonase nasal spray and generic Claritin as needed. Pt c/o occasional runny nose. Anxiety- Pt takes Sertraline 25mg po q day. 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 Review of Systems   Constitutional: Negative for activity change, appetite change, chills, diaphoresis, fatigue, fever and unexpected weight change. HENT: Positive for rhinorrhea. Negative for congestion, ear discharge, ear pain, facial swelling, hearing loss, mouth sores, nosebleeds, postnasal drip, sinus pressure, sinus pain, sneezing, sore throat, tinnitus, trouble swallowing and voice change. Eyes: Negative for photophobia, pain, discharge, redness, itching and visual disturbance. Respiratory: Negative for apnea, cough, choking, chest tightness, shortness of breath and wheezing. Cardiovascular: Negative for chest pain, palpitations and leg swelling. Gastrointestinal: Negative for abdominal distention, abdominal pain, anal bleeding, blood in stool, constipation, diarrhea, nausea, rectal pain and vomiting. Endocrine: Negative for cold intolerance and heat intolerance.    Genitourinary: Negative for decreased urine volume, difficulty urinating, dysuria, flank pain, frequency, genital sores, hematuria, menstrual problem, pelvic pain, urgency, vaginal bleeding, vaginal discharge and vaginal pain. Musculoskeletal: Negative for arthralgias, back pain, gait problem, joint swelling, myalgias, neck pain and neck stiffness. Skin: Negative for rash and wound. Neurological: Negative for dizziness, tremors, seizures, syncope, facial asymmetry, speech difficulty, weakness, light-headedness, numbness and headaches. Hematological: Negative for adenopathy. Does not bruise/bleed easily. Psychiatric/Behavioral: Negative for decreased concentration, dysphoric mood and sleep disturbance. The patient is nervous/anxious. All other systems reviewed and are negative.       Past Medical History:   Diagnosis Date    Anxiety     Endometriosis     Hyperlipidemia     Hypertension        Past Surgical History:   Procedure Laterality Date    ACHILLES TENDON SURGERY Right 6/24/2019    RIGHT ACHILLES TENDON RELEASE AND HEEL EXOSTECTOMY performed by Dayana Mattson DPM at [address]  [address] [place] [address] [facility]    COLONOSCOPY  03/18/2015    Brien GOMEZ    FOOT SURGERY Left 2013    left foot recontstructon    HYSTERECTOMY  2013    complete - vaginal hyst    TUBAL LIGATION  2013    with ablation    US THYROID BIOPSY  7/31/2020    US THYROID BIOPSY [facility] ULTRASOUND       Outpatient Medications Marked as Taking for the 9/1/20 encounter (Office Visit) with Patrick Mcmullen MD   Medication Sig Dispense Refill    pantoprazole (PROTONIX) 40 MG tablet Take 1 tablet by mouth daily 30 tablet 2    Acetaminophen (TYLENOL ARTHRITIS PAIN PO) Take by mouth PRN      promethazine (PHENERGAN) 25 MG tablet Take 1 tablet by mouth every 6 hours as needed for Nausea 20 tablet 0    lisinopril-hydroCHLOROthiazide (PRINZIDE;ZESTORETIC) 10-12.5 MG per tablet Take 1 tablet by mouth daily 90 tablet 1    tenderness found. Right ankle: She exhibits no swelling. No tenderness. Left ankle: She exhibits no swelling. No tenderness. Cervical back: She exhibits normal range of motion, no tenderness and no spasm. Thoracic back: She exhibits no tenderness and no spasm. Lumbar back: She exhibits normal range of motion, no tenderness and no spasm. Right upper arm: She exhibits no tenderness. Left upper arm: She exhibits no tenderness. Right hand: She exhibits no tenderness and no swelling. Left hand: She exhibits no tenderness and no swelling. Right upper leg: She exhibits no tenderness. Left upper leg: She exhibits no tenderness. Right lower leg: She exhibits no tenderness. No edema. Left lower leg: She exhibits no tenderness. No edema. Right foot: No tenderness or swelling. Left foot: No tenderness or swelling. Lymphadenopathy:      Head:      Right side of head: No submandibular adenopathy. Left side of head: No submandibular adenopathy. Cervical: No cervical adenopathy. Skin:     General: Skin is warm and dry. Findings: No bruising, erythema, lesion or rash. Neurological:      Mental Status: She is alert and oriented to person, place, and time. Cranial Nerves: No cranial nerve deficit. Gait: Gait normal.      Deep Tendon Reflexes: Reflexes are normal and symmetric. Babinski sign absent on the right side. Babinski sign absent on the left side.    Psychiatric:         Mood and Affect: Mood normal.         Speech: Speech normal.         Results for POC orders placed in visit on 09/01/20   POCT Urinalysis no Micro   Result Value Ref Range    Color, UA yellow     Clarity, UA clear     Glucose, UA POC neg     Bilirubin, UA neg     Ketones, UA neg     Spec Grav, UA 1.015     Blood, UA POC neg     pH, UA 5.5     Protein, UA POC neg     Urobilinogen, UA 0.2     Leukocytes, UA neg     Nitrite, UA neg          Assessment/Plan 1. Annual physical exam  - POCT Urinalysis no Micro  - Patient plans to have fasting labs done  -Mammogram done on 6/9/20  - Colonoscopy done on 3/18/15  - Tdap done on 9/24/12  - Shingrix today  -Regular aerobic exercise    2. Vaginal Pap smear  - PAP SMEAR done    3. Essential hypertension  -stable  -Continue same medications  -Low sodium diet  -Regular aerobic exercise    4. Mixed hyperlipidemia  -Continue same medications  -Low fat, low cholesterol diet  -Regular aerobic exercise    5. Prediabetes  -Low carbohydrate diet  -Regular aerobic exercise    6. Gastroesophageal reflux disease, esophagitis presence not specified  - stable  - Continue pantoprazole (PROTONIX) 40 MG tablet; Take 1 tablet by mouth 2 times daily  Dispense: 180 tablet; Refill: 1  -Decrease caffeine, avoid spicy foods, avoid tomato based foods  -Eat small meals instead of large meals  -Wait 2-3 hours after eating before lying down    .,7. Allergic rhinitis, unspecified seasonality, unspecified trigger  -stable  -Continue same medications    8. Anxiety  -stable  -Continue same medications    9. Immunization due  - zoster recombinant adjuvanted vaccine [facility]) injection 50 mcg given    Discussed medications with patient, who voiced understanding of their use and indications. All questions answered. Return in about 6 months (around 3/1/2021) for hypertension, GERD, hyperlipidemia, anxiety, and prediabetes.

## 2020-09-02 ENCOUNTER — OFFICE VISIT (OUTPATIENT)
Dept: ENDOCRINOLOGY | Age: 50
End: 2020-09-02
Payer: COMMERCIAL

## 2020-09-02 VITALS
BODY MASS INDEX: 45.82 KG/M2 | OXYGEN SATURATION: 96 % | DIASTOLIC BLOOD PRESSURE: 73 MMHG | SYSTOLIC BLOOD PRESSURE: 122 MMHG | WEIGHT: 249 LBS | TEMPERATURE: 98.6 F | RESPIRATION RATE: 14 BRPM | HEIGHT: 62 IN | HEART RATE: 103 BPM

## 2020-09-02 PROCEDURE — 99215 OFFICE O/P EST HI 40 MIN: CPT | Performed by: INTERNAL MEDICINE

## 2020-09-02 NOTE — PROGRESS NOTES
SUBJECTIVE:  Sugey Bennett is a 47 y.o. female who is being evaluated for hypercalcemia. 1. Hyperparathyroidism, primary   This started in 2020. Patient was diagnosed with hypercalcemia. The problem has been gradually worsening. Patient started medication in N/A. Currently patient is on: N/A. Misses  N/A doses a month. Current complaints: fatigue, easy gets hot  No history of kidney stones  No persistent constipation, aches, depression  Takes Tums several tablets daily  4-6 weeks ago, recently no Tums for 2 weeks    2. Multinodular goiter  History of obstructive symptoms: difficulty swallowing No, changes in voice/hoarseness No.  History of radiation to patient's neck: No  Resent iodine exposure: No  Family history includes no thyroid abnormalities. Family history of thyroid cancer: No    3. Premature menopause  Menopause since age 38  Was on Estrogen    4. Class 3 severe obesity with body mass index (BMI) of 45.0 to 49.9 in adult, unspecified obesity type, unspecified whether serious comorbidity present ([facility] [address]  He is moderately healthy, active    5. Hashimoto's thyroiditis    6. Osteopenia  Calcium in diet  No bone pain  10 year risk for major osteoporotic fracture is 3.8% and risk of a hip    fracture is 0.2%. EXAMINATION:    BONE DENSITOMETRY         7/31/2020 8:26 am         TECHNIQUE:    A bone density DEXA scan was performed of the lumbar spine, bilateral hips    and left forearm on a Hadapt system.         COMPARISON:    None.         HISTORY:    ORDERING SYSTEM PROVIDED HISTORY: Hyperparathyroidism, primary [facility])    TECHNOLOGIST PROVIDED HISTORY:    Reason for exam:->Hyperparathyroidism. Please include non-dominant forearm. Is the patient pregnant?->No         FINDINGS:    LEFT FOREARM:         The BMD of the middle third of the radius of the distal forearm equals 0.64    g/cm2.  The T- and Z-scores are -0.9 and -0.2 standard deviations from the    mean.  This is within the normal range by WHO criteria.         LUMBAR SPINE:         The bone mineral density in the lumbar spine including the L1-L4 levels is    measured at 0.89 g/cm2, which corresponds to a T-score of -1.4 and a Z-score    of -0.6.  This is within the osteopenia range by WHO criteria.         LEFT HIP:         The bone mineral density in the total hip is measured at 0.92 g/cm2    corresponding to a T-score of -0.2 and a Z-score of 0.3.  This is within the    normal range by WHO criteria.         The bone mineral density of the femoral neck is measured at 0.75 g/cm2    corresponding to a T-score of -0.9 and a Z-score of -0.1.  This is within the    normal range by WHO criteria.         RIGHT HIP:         The bone mineral density in the total hip is measured at 0.90 g/cm2    corresponding to a T-score of -0.3 and a Z-score of 0.2.  This is within the    normal range by WHO criteria.         The bone mineral density of the femoral neck is measured at 0.71 g/cm2    corresponding to a T-score of -1.2 and a Z-score of -0.5.  This is within the    osteopenia range by WHO criteria.         10 year risk for major osteoporotic fracture is 3.8% and risk of a hip    fracture is 0.2%.              Impression    Osteopenia by WHO criteria.                 Past Medical History:   Diagnosis Date    Anxiety     Endometriosis     Hyperlipidemia     Hypertension      Patient Active Problem List    Diagnosis Date Noted    Multinodular goiter 08/06/2020    Hashimoto's thyroiditis 08/06/2020    Osteopenia 08/01/2020    Class 3 severe obesity with body mass index (BMI) of 45.0 to 49.9 in adult ([address] 07/07/2020    Hyperparathyroidism, primary ([address] 07/07/2020    Premature menopause 07/07/2020    Epigastric abdominal pain 06/12/2020    Urinary tract infection symptoms 06/12/2020    Nausea 06/12/2020    Low back pain without sciatica 06/12/2020    Hypercalcemia 06/12/2020    Diarrhea 06/12/2020    Allergic rhinitis 03/20/2020    Pain of right middle finger 03/20/2020    Anxiety 05/10/2018    Gastroesophageal reflux disease 10/30/2016    Surgical menopause on hormone replacement therapy 12/17/2015    Pre-diabetes 12/17/2015    Hypertriglyceridemia 12/17/2015    HTN (hypertension) 07/01/2015     Past Surgical History:   Procedure Laterality Date    ACHILLES TENDON SURGERY Right 6/24/2019    RIGHT ACHILLES TENDON RELEASE AND HEEL EXOSTECTOMY performed by Deborah Chan DPM at [address]  2004   [address] COLONOSCOPY  03/18/2015    Brien GOMEZ    FOOT SURGERY Left 2013    left foot recontstructon    HYSTERECTOMY  2013    complete - vaginal hyst    TUBAL LIGATION  2013    with ablation    US THYROID BIOPSY  7/31/2020    US THYROID BIOPSY [facility] ULTRASOUND     Family History   Problem Relation Age of Onset    Diabetes Mother     High Blood Pressure Mother     Stroke Mother     High Blood Pressure Father     Cancer Maternal Aunt 58        breast    Cancer Maternal Grandmother 72        colon cancer    Cancer Paternal Grandmother         lung     Social History     Socioeconomic History    Marital status:      Spouse name: None    Number of children: None    Years of education: None    Highest education level: None   Occupational History    None   Social Needs    Financial resource strain: None    Food insecurity     Worry: None     Inability: None    Transportation needs     Medical: None     Non-medical: None   Tobacco Use    Smoking status: Never Smoker    Smokeless tobacco: Never Used   Substance and Sexual Activity    Alcohol use:  Yes     Alcohol/week: 0.8 standard drinks     Types: 1 Standard drinks or equivalent per week     Comment: social    Drug use: No    Sexual activity: Yes     Partners: Male   Lifestyle    Physical activity     Days per week: None     Minutes per session: None    Stress: None   Relationships    Social connections     Talks on phone: None     Gets together: None     Attends [employer] witness service: None     Active member of club or organization: None     Attends meetings of clubs or organizations: None     Relationship status: None    Intimate partner violence     Fear of current or ex partner: None     Emotionally abused: None     Physically abused: None     Forced sexual activity: None   Other Topics Concern    None   Social History Narrative    None     Current Outpatient Medications   Medication Sig Dispense Refill    pantoprazole (PROTONIX) 40 MG tablet Take 1 tablet by mouth 2 times daily 180 tablet 1    Acetaminophen (TYLENOL ARTHRITIS PAIN PO) Take by mouth PRN      promethazine (PHENERGAN) 25 MG tablet Take 1 tablet by mouth every 6 hours as needed for Nausea 20 tablet 0    lisinopril-hydroCHLOROthiazide (PRINZIDE;ZESTORETIC) 10-12.5 MG per tablet Take 1 tablet by mouth daily 90 tablet 1    fenofibrate (TRICOR) 145 MG tablet Take 1 tablet by mouth daily 90 tablet 1    sertraline (ZOLOFT) 25 MG tablet TAKE ONE TABLET BY MOUTH DAILY 90 tablet 1    Multiple Vitamins-Minerals (THERAPEUTIC MULTIVITAMIN-MINERALS) tablet Take 1 tablet by mouth daily      fluticasone (FLONASE) 50 MCG/ACT nasal spray 2 sprays by Nasal route daily. 1 Bottle 3     No current facility-administered medications for this visit.       Allergies   Allergen Reactions    Sulfa Antibiotics Rash     Family Status   Relation Name Status    Mother  Alive    Father  Alive    [address]  (Not Specified)    MGM  (Not Specified)    Sister  Alive    Brother  Alive    PGM  (Not Specified)   [address] Sister   at age 32        Down Syndrome       Review of Systems:  Constitutional: has fatigue, no fever, no recent weight gain, no recent weight loss, no changes in appetite  Eyes: no eye pain, no change in vision, no eye redness, no eye irritation, no double vision  Ears, nose, throat: no nasal congestion, no sore throat, no earache, no decrease in hearing, no hoarseness, no dry mouth, no sinus problems, no difficulty swallowing, no neck lumps, no dental problems, no mouth sores, no ringing in ears  Pulmonary: no shortness of breath, no wheezing, no dyspnea on exertion, no cough  Cardiovascular: no chest pain, no lower extremity edema, no orthopnea, no intermittent leg claudication, no palpitations  Gastrointestinal: has abdominal pain, no nausea, no vomiting, has diarrhea, has constipation, no dysphagia, has heartburn, no bloating  Genitourinary: no dysuria, no urinary incontinence, no urinary hesitancy, no urinary frequency, no feelings of urinary urgency, no nocturia  Musculoskeletal: no joint swelling, no joint stiffness, has joint pain, has muscle cramps, no muscle pain, no bone pain  Integument/Breast: no hair loss, no skin rashes, no skin lesions, no itching, no dry skin  Neurological: no numbness, no tingling, no weakness, no confusion, no headaches, no dizziness, no fainting, no tremors, no decrease in memory, no balance problems  Psychiatric: no anxiety, no depression, no insomnia  Hematologic/Lymphatic: no tendency for easy bleeding, no swollen lymph nodes, no tendency for easy bruising  Immunology: has seasonal allergies, no frequent infections, no frequent illnesses  Endocrine: has temperature intolerance    /73   Pulse 103   Temp 98.6 °F (37 °C)   Resp 14   Ht 5' 2\" (1.575 m)   Wt 249 lb (112.9 kg)   SpO2 96%   BMI 45.54 kg/m²    Wt Readings from Last 3 Encounters:   09/02/20 249 lb (112.9 kg)   09/01/20 248 lb 3.2 oz (112.6 kg)   08/06/20 250 lb 6.4 oz (113.6 kg)     Body mass index is 45.54 kg/m².     OBJECTIVE:  Constitutional: no acute distress, well appearing and well nourished  Psychiatric: oriented to person, place and time, judgement and insight and normal, recent and remote memory and intact and mood and affect are normal  Skin: skin and subcutaneous tissue is normal without mass, normal turgor  Head and Face: examination of head and face revealed no abnormalities  Eyes: no lid or conjunctival swelling, erythema or discharge, pupils are normal, equal, round, reactive to light  Ears/Nose: external inspection of ears and nose revealed no abnormalities, hearing is grossly normal  Oropharynx/Mouth/Face: lips, tongue and gums are normal with no lesions, the voice quality was normal  Neck: neck is supple and symmetric, with midline trachea and no masses, thyroid is enlarged  Lymphatics: normal cervical lymph nodes, normal supraclavicular nodes  Pulmonary: no increased work of breathing or signs of respiratory distress, lungs are clear to auscultation  Cardiovascular: normal heart rate and rhythm, normal S1 and S2, no murmurs and pedal pulses and 2+ bilaterally, No edema  Abdomen: abdomen is soft, non-tender with no masses  Musculoskeletal: normal gait and station and exam of the digits and nails are normal  Neurological: normal coordination and normal general cortical function      Lab Review:    Lab Results   Component Value Date    WBC 7.2 06/05/2020    HGB 12.8 06/05/2020    HCT 39.5 06/05/2020    MCV 82.3 06/05/2020     06/05/2020     Lab Results   Component Value Date     06/03/2020    K 4.5 06/03/2020    CL 98 06/03/2020    CO2 26 06/03/2020    BUN 12 06/03/2020    CREATININE 0.7 07/22/2020    GLUCOSE 107 06/03/2020    CALCIUM 10.2 07/21/2020    PROT 6.9 07/21/2020    LABALBU 3.5 07/21/2020    LABALBU 4.3 07/21/2020    BILITOT 0.3 06/03/2020    ALKPHOS 80 06/03/2020    AST 29 06/03/2020    ALT 41 06/03/2020    LABGLOM >60 06/03/2020    GFRAA >60 06/03/2020    AGRATIO 1.9 06/03/2020    GLOB 2.5 06/03/2020     Lab Results   Component Value Date    TSH 1.66 07/21/2020     Lab Results   Component Value Date    LABA1C 5.9 06/03/2020     Lab Results   Component Value Date    .6 06/03/2020     Lab Results   Component Value Date    CHOL 165 06/03/2020     Lab Results   Component Value Date    TRIG 221 06/03/2020     Lab Results Component Value Date    HDL 27 06/03/2020     Lab Results   Component Value Date    LDLCALC 94 06/03/2020     Lab Results   Component Value Date    LABVLDL 44 06/03/2020     No results found for: [facility]  Lab Results   Component Value Date    LABMICR YES 06/05/2020     Lab Results   Component Value Date    VITD25 42.2 07/21/2020        ASSESSMENT/PLAN:  1. Hyperparathyroidism, primary   Meets indications for surgery, age 47  Refered for surgical evaluation  Parathyroid sestamibi scan did not localize adenoma  Discussed with patient limitations of parathyroid sestamibi scan   Order labs later depending on the planned return visit  24-hour urine calcium 218  Creatinine clearance 95  Calcium 11.3-10.2 (ULN 10.6)  Ionized calcium 1.34  25 hydroxy vitamin D 42.2  PTH 38-44. 9  The serum protein electrophoresis is unremarkable.  No monoclonal band   is seen on serum protein electrophoresis  - Vitamin D 25 Hydroxy; Future  - Calcium Ionized Serum; Future  - PTH, Intact; Future    2. Multinodular goiter  Left FNA biopsy sample for Afirma testing for indeterminate lesion-suspicious  Counseled patient extensively about thyroid biopsy results, cytology diagnosis, atypia of undetermined significance, risk of thyroid cancer, work-up, indications for surgery, suspicious lesions on Afirma testing  ADDENDUM:  The following test was received from [facility]). Afirma Thyroid FNA Analysis   Nodule:  A, Thyroid, lower right. 2.1 cm. Afirma Genomic Sequencing : TNP   MTC:  TNP   Parathyroid:  TNP   BRAF p V6OOE c. 1799T > A:  TNP   RET/PTC1, RET/PTC3:  TNP   Afirma Xpression [place]:  N/A   Results Interpretation:  Test cancelled (TNP) due to absence of   concurrent or prior indeterminate cytopathology findings. Test not   performed (TNP). See above comments for additional information. Nodule:  B, Thyroid, middle left, 1.1 cm.    Afirma Genomic Sequencing : Suspicious (Risk of malignancy 50%) MTC: Negative   Parathyroid:  Negative   BRAF p V6OOE c. 1799T > A:  Negative   RET/PTC1, RET/PTC3:  Not Detected   Afirma Xpression [place]:  Contact Client Services to Order Afirma XA. Results Interpretation: The result of this 1.1 cm [place] III nodule B   is Afirma GSC Suspicious, which suggests a risk of cancer of   approximately 50%. Clinical correlation and surgical resection should be   considered. A.  Thyroid, Right Fine Needle Aspiration:         No malignant cells identified        Note: The aspirate consists of small number of benign-appearing        follicular cells. B.  Thyroid, Left Fine Needle Aspiration:        Atypia of undetermined significance        Note: The aspirate consists of small number of follicular cells with        architectural atypia. Some lymphocytes are also present. - US Head Neck Soft Tissue Thyroid; Future    3. Class 3 severe obesity with body mass index (BMI) of 45.0 to 49.9 in adult, unspecified obesity type, unspecified whether serious comorbidity present (HCC)  Diet and exercise  Since age 38.    3. Premature menopause  Calcium and vitamin D supplementation  DEXA scan    5. Hashimoto's thyroiditis  New problem  TSH 1.66    6. Osteopenia  New problem  Calcium, vitamin D supplementation was discussed, written instructions were given how to calculate calcium in diet  Counseled patient on risk fracture  10 year risk for major osteoporotic fracture is 3.8% and risk of a hip    fracture is 0.2%.         Reviewed and/or ordered clinical lab results Yes  Reviewed and/or ordered radiology tests Yes   Reviewed and/or ordered other diagnostic tests No  Discussed test results with performing physician No  Independently reviewed image, tracing, or specimen No  Made a decision to obtain old records No  Reviewed and summarized old records Yes   Calcium elevated since 2015, later upper limit of normal, in 2020 elevated again  PTH 38  Calcium 11.1-10.5-10.2-10.1-10.8-11.3, upper limit normal 10.6  TSH 1.4  Obtained history from other than patient Lexie    Mackenzie Melchor was counseled regarding symptoms of primary hyperparathyroidism, thyroid nodules, premature menopause diagnosis, course and complications of disease if inadequately treated, side effects of medications, diagnosis, treatment options, and prognosis, risks, benefits, complications, and alternatives of treatment, labs, imaging and other studies and treatment targets and goals, differential diagnosis of hypercalcemia, indications for parathyroid surgery, follow-up, atypia of undetermined significance, Afirma testing, risk of thyroid cancer, indications for surgery. She understands instructions and counseling. Total visit time 40 minutes, more than 50% was face-to-face counseling time  See assessment, plan and counseling note for details    Return in about 3 months (around 12/2/2020) for thyroid, parathyroid problems.     Electronically signed by Megan Cabello MD on 9/5/2020 at 1:25 AM

## 2020-09-08 ASSESSMENT — ENCOUNTER SYMPTOMS
FACIAL SWELLING: 0
ANAL BLEEDING: 0
BACK PAIN: 0
SINUS PAIN: 0
CONSTIPATION: 0
SINUS PRESSURE: 0
VOICE CHANGE: 0
SHORTNESS OF BREATH: 0
WHEEZING: 0
BLOOD IN STOOL: 0
PHOTOPHOBIA: 0
EYE DISCHARGE: 0
ABDOMINAL PAIN: 0
DIARRHEA: 0
ABDOMINAL DISTENTION: 0
EYE REDNESS: 0
CHOKING: 0
TROUBLE SWALLOWING: 0
NAUSEA: 0
COUGH: 0
EYE PAIN: 0
EYE ITCHING: 0
CHEST TIGHTNESS: 0
VOMITING: 0
SORE THROAT: 0
APNEA: 0
RHINORRHEA: 1
RECTAL PAIN: 0

## 2020-09-12 RX ORDER — LISINOPRIL AND HYDROCHLOROTHIAZIDE 12.5; 1 MG/1; MG/1
TABLET ORAL
Qty: 90 TABLET | Refills: 1 | Status: SHIPPED | OUTPATIENT
Start: 2020-09-12 | End: 2021-03-11

## 2020-09-12 RX ORDER — FENOFIBRATE 145 MG/1
TABLET, COATED ORAL
Qty: 90 TABLET | Refills: 1 | Status: SHIPPED | OUTPATIENT
Start: 2020-09-12 | End: 2021-03-11

## 2020-09-12 RX ORDER — SERTRALINE HYDROCHLORIDE 25 MG/1
TABLET, FILM COATED ORAL
Qty: 90 TABLET | Refills: 1 | Status: SHIPPED | OUTPATIENT
Start: 2020-09-12 | End: 2021-06-03 | Stop reason: ALTCHOICE

## 2020-09-12 NOTE — TELEPHONE ENCOUNTER
Medication:   Requested Prescriptions     Pending Prescriptions Disp Refills    fenofibrate (TRICOR) 145 MG tablet [Pharmacy Med Name: FENOFIBRATE 145 MG TABLET] 90 tablet 0     Sig: TAKE ONE TABLET BY MOUTH DAILY    sertraline (ZOLOFT) 25 MG tablet [Pharmacy Med Name: SERTRALINE HCL 25 MG TABLET] 90 tablet 0     Sig: TAKE ONE TABLET BY MOUTH DAILY    lisinopril-hydroCHLOROthiazide (PRINZIDE;ZESTORETIC) 10-12.5 MG per tablet [Pharmacy Med Name: LISINOPRIL-HCTZ 10-12.5 MG TAB] 90 tablet 0     Sig: TAKE ONE TABLET BY MOUTH DAILY     Last Filled:  03/20/20    Last appt: 9/1/2020   Next appt: 3/2/2021    Last OARRS: No flowsheet data found.

## 2020-09-22 ENCOUNTER — TELEPHONE (OUTPATIENT)
Dept: PRIMARY CARE CLINIC | Age: 50
End: 2020-09-22

## 2020-09-22 NOTE — TELEPHONE ENCOUNTER
----- Message from Marshall Hines sent at 9/21/2020  2:58 PM EDT -----  Subject: Results Request    QUESTIONS  Which lab or imaging result is the patient calling about? Pap Smear  Which provider ordered the test? Marian Lora   At what location was the test performed? Date the test was performed? Additional Information for Provider? Patient is calling to get results   from pap smear.  ---------------------------------------------------------------------------  --------------  CALL BACK INFO  What is the best way for the office to contact you? OK to leave message on   voicemail  Preferred Call Back Phone Number?  [phone]

## 2020-09-23 ENCOUNTER — TELEPHONE (OUTPATIENT)
Dept: PRIMARY CARE CLINIC | Age: 50
End: 2020-09-23

## 2020-10-01 RX ORDER — PANTOPRAZOLE SODIUM 40 MG/1
TABLET, DELAYED RELEASE ORAL
Qty: 30 TABLET | Refills: 3 | Status: SHIPPED | OUTPATIENT
Start: 2020-10-01 | End: 2021-03-17

## 2020-10-01 NOTE — TELEPHONE ENCOUNTER
Medication:   Requested Prescriptions     Pending Prescriptions Disp Refills    pantoprazole (PROTONIX) 40 MG tablet [Pharmacy Med Name: PANTOPRAZOLE SOD DR 40 MG TAB] 30 tablet 1     Sig: TAKE ONE TABLET BY MOUTH EVERY MORNING BEFORE BREAKFAST     Last Filled: 9.1.20    Last appt: 9/1/2020   Next appt: 3.2.21    Last OARRS: No flowsheet data found.

## 2020-10-05 ENCOUNTER — OFFICE VISIT (OUTPATIENT)
Dept: PRIMARY CARE CLINIC | Age: 50
End: 2020-10-05
Payer: COMMERCIAL

## 2020-10-05 VITALS
SYSTOLIC BLOOD PRESSURE: 120 MMHG | TEMPERATURE: 97.3 F | OXYGEN SATURATION: 97 % | HEIGHT: 62 IN | HEART RATE: 100 BPM | WEIGHT: 247.6 LBS | DIASTOLIC BLOOD PRESSURE: 80 MMHG | BODY MASS INDEX: 45.56 KG/M2

## 2020-10-05 DIAGNOSIS — M85.80 OSTEOPENIA, UNSPECIFIED LOCATION: ICD-10-CM

## 2020-10-05 DIAGNOSIS — E28.319 PREMATURE MENOPAUSE: ICD-10-CM

## 2020-10-05 DIAGNOSIS — E04.2 MULTINODULAR GOITER: ICD-10-CM

## 2020-10-05 DIAGNOSIS — E21.0 HYPERPARATHYROIDISM, PRIMARY (HCC): ICD-10-CM

## 2020-10-05 DIAGNOSIS — Z01.811 PRE-OP CHEST EXAM: ICD-10-CM

## 2020-10-05 PROBLEM — R39.9 URINARY TRACT INFECTION SYMPTOMS: Status: RESOLVED | Noted: 2020-06-12 | Resolved: 2020-10-05

## 2020-10-05 LAB
A/G RATIO: 1.9 (ref 1.1–2.2)
ALBUMIN SERPL-MCNC: 4.5 G/DL (ref 3.4–5)
ALP BLD-CCNC: 93 U/L (ref 40–129)
ALT SERPL-CCNC: 35 U/L (ref 10–40)
ANION GAP SERPL CALCULATED.3IONS-SCNC: 16 MMOL/L (ref 3–16)
AST SERPL-CCNC: 33 U/L (ref 15–37)
BILIRUB SERPL-MCNC: <0.2 MG/DL (ref 0–1)
BUN BLDV-MCNC: 14 MG/DL (ref 7–20)
CALCIUM SERPL-MCNC: 10.5 MG/DL (ref 8.3–10.6)
CHLORIDE BLD-SCNC: 101 MMOL/L (ref 99–110)
CO2: 22 MMOL/L (ref 21–32)
CREAT SERPL-MCNC: 0.8 MG/DL (ref 0.6–1.1)
GFR AFRICAN AMERICAN: >60
GFR NON-AFRICAN AMERICAN: >60
GLOBULIN: 2.4 G/DL
GLUCOSE BLD-MCNC: 134 MG/DL (ref 70–99)
HCT VFR BLD CALC: 37.6 % (ref 36–48)
HEMOGLOBIN: 12.5 G/DL (ref 12–16)
MAGNESIUM: 2.2 MG/DL (ref 1.8–2.4)
MCH RBC QN AUTO: 26.9 PG (ref 26–34)
MCHC RBC AUTO-ENTMCNC: 33.1 G/DL (ref 31–36)
MCV RBC AUTO: 81.3 FL (ref 80–100)
PARATHYROID HORMONE INTACT: 31.9 PG/ML (ref 14–72)
PDW BLD-RTO: 14.2 % (ref 12.4–15.4)
PHOSPHORUS: 2.9 MG/DL (ref 2.5–4.9)
PLATELET # BLD: 353 K/UL (ref 135–450)
PMV BLD AUTO: 7.8 FL (ref 5–10.5)
POTASSIUM SERPL-SCNC: 3.9 MMOL/L (ref 3.5–5.1)
RBC # BLD: 4.63 M/UL (ref 4–5.2)
SODIUM BLD-SCNC: 139 MMOL/L (ref 136–145)
TOTAL PROTEIN: 6.9 G/DL (ref 6.4–8.2)
VITAMIN D 25-HYDROXY: 39.4 NG/ML
WBC # BLD: 6.4 K/UL (ref 4–11)

## 2020-10-05 PROCEDURE — 93000 ELECTROCARDIOGRAM COMPLETE: CPT | Performed by: NURSE PRACTITIONER

## 2020-10-05 PROCEDURE — 99242 OFF/OP CONSLTJ NEW/EST SF 20: CPT | Performed by: NURSE PRACTITIONER

## 2020-10-05 ASSESSMENT — ENCOUNTER SYMPTOMS
NAUSEA: 0
BLOOD IN STOOL: 0
VOMITING: 0
CHEST TIGHTNESS: 0
EYE PAIN: 0
CONSTIPATION: 0
BACK PAIN: 0
ABDOMINAL PAIN: 0
EYE ITCHING: 0
TROUBLE SWALLOWING: 0
DIARRHEA: 0
VOICE CHANGE: 0
EYE DISCHARGE: 0
COLOR CHANGE: 0
WHEEZING: 0
SHORTNESS OF BREATH: 0
SORE THROAT: 0
COUGH: 0
ABDOMINAL DISTENTION: 0

## 2020-10-05 ASSESSMENT — PATIENT HEALTH QUESTIONNAIRE - PHQ9
SUM OF ALL RESPONSES TO PHQ9 QUESTIONS 1 & 2: 0
2. FEELING DOWN, DEPRESSED OR HOPELESS: 0
1. LITTLE INTEREST OR PLEASURE IN DOING THINGS: 0
SUM OF ALL RESPONSES TO PHQ QUESTIONS 1-9: 0
SUM OF ALL RESPONSES TO PHQ QUESTIONS 1-9: 0

## 2020-10-05 NOTE — PATIENT INSTRUCTIONS
Check labs today. Will fax over results and pre op note once results received. Continue with covid test as scheduled.

## 2020-10-05 NOTE — PROGRESS NOTES
Preoperative Consultation        Name: Gareth Adan  YOB: 1970    This patient presents to the office today for a preoperative consultation at the request of surgeon, Demetrius Castellanos, who plans on performing parathyroidecomy and thyroidectomy on  at [facility].      Planned anesthesia: General   Known anesthesia problems: None   Bleeding risk: No recent or remote history of abnormal bleeding  Personal or FH ofDVT/PE: No      covid test scheduled for Saturday at [facility]       Patient Active Problem List   Diagnosis    HTN (hypertension)    Surgical menopause on hormone replacement therapy    Pre-diabetes    Hypertriglyceridemia    Gastroesophageal reflux disease    Anxiety    Allergic rhinitis    Pain of right middle finger    Epigastric abdominal pain    Urinary tract infection symptoms    Nausea    Low back pain without sciatica    Hypercalcemia    Diarrhea    Class 3 severe obesity with body mass index (BMI) of 45.0 to 49.9 in adult ([address]    Hyperparathyroidism, primary ([address]    Premature menopause    Osteopenia    Multinodular goiter    Hashimoto's thyroiditis    Colon polyp     Past Surgical History:   Procedure Laterality Date    ACHILLES TENDON SURGERY Right 2019    RIGHT ACHILLES TENDON RELEASE AND HEEL EXOSTECTOMY performed by Evette Castaneda DPM at [address]  2004     SECTION     [facility] COLONOSCOPY  2015    Brien GOMEZ    FOOT SURGERY Left 2013    left foot recontstructon    HYSTERECTOMY  2013    complete - vaginal hyst    TUBAL LIGATION      with ablation    US THYROID BIOPSY  2020    US THYROID BIOPSY [facility] ULTRASOUND     Allergies   Allergen Reactions    Sulfa Antibiotics Rash     Outpatient Medications Marked as Taking for the 10/5/20 encounter (Office Visit) with TROY Goyal CNP   Medication Sig Dispense Refill    pantoprazole (PROTONIX) 40 MG tablet TAKE ONE TABLET BY MOUTH EVERY MORNING BEFORE BREAKFAST 30 tablet 3    fenofibrate (TRICOR) 145 MG tablet TAKE ONE TABLET BY MOUTH DAILY 90 tablet 1    sertraline (ZOLOFT) 25 MG tablet TAKE ONE TABLET BY MOUTH DAILY 90 tablet 1    lisinopril-hydroCHLOROthiazide (PRINZIDE;ZESTORETIC) 10-12.5 MG per tablet TAKE ONE TABLET BY MOUTH DAILY 90 tablet 1    Acetaminophen (TYLENOL ARTHRITIS PAIN PO) Take by mouth PRN      promethazine (PHENERGAN) 25 MG tablet Take 1 tablet by mouth every 6 hours as needed for Nausea 20 tablet 0    Multiple Vitamins-Minerals (THERAPEUTIC MULTIVITAMIN-MINERALS) tablet Take 1 tablet by mouth daily      fluticasone (FLONASE) 50 MCG/ACT nasal spray 2 sprays by Nasal route daily. 1 Bottle 3       Social History     Tobacco Use    Smoking status: Never Smoker    Smokeless tobacco: Never Used   Substance Use Topics    Alcohol use: Yes     Alcohol/week: 0.8 standard drinks     Types: 1 Standard drinks or equivalent per week     Comment: social     Family History   Problem Relation Age of Onset    Diabetes Mother     High Blood Pressure Mother     Stroke Mother     High Blood Pressure Father     Cancer Maternal Aunt 58        breast    Cancer Maternal Grandmother 72        colon cancer    Cancer Paternal Grandmother         lung     Review of Systems  Review of Systems   Constitutional: Negative for activity change, appetite change, chills, fatigue and unexpected weight change. HENT: Negative for congestion, ear pain, hearing loss, nosebleeds, postnasal drip, sneezing, sore throat, trouble swallowing and voice change. Eyes: Negative for pain, discharge and itching. Respiratory: Negative for cough, chest tightness, shortness of breath and wheezing. Cardiovascular: Negative for chest pain, palpitations and leg swelling. Gastrointestinal: Negative for abdominal distention, abdominal pain, blood in stool, constipation, diarrhea, nausea and vomiting.    Endocrine: Negative for cold or JVD. Trachea: No tracheal deviation. Cardiovascular:      Rate and Rhythm: Normal rate and regular rhythm. Heart sounds: Normal heart sounds. No murmur. No friction rub. Pulmonary:      Effort: Pulmonary effort is normal. No respiratory distress. Breath sounds: Normal breath sounds. No stridor. No wheezing, rhonchi or rales. Chest:      Chest wall: No tenderness. Abdominal:      General: Abdomen is flat. There is no distension. Palpations: Abdomen is soft. Musculoskeletal: Normal range of motion. General: No deformity. Right lower leg: No edema. Left lower leg: No edema. Lymphadenopathy:      Cervical: No cervical adenopathy. Skin:     General: Skin is warm and dry. Coloration: Skin is not pale. Findings: No erythema or rash. Neurological:      General: No focal deficit present. Mental Status: She is alert and oriented to person, place, and time. Cranial Nerves: No cranial nerve deficit. Motor: No weakness. Gait: Gait normal.   Psychiatric:         Mood and Affect: Mood normal.         Behavior: Behavior normal.         Thought Content: Thought content normal.         Judgment: Judgment normal.       EKG Interpretation:  normal EKG, normal sinus rhythm. Lab Review: Yes    ASSESSMENT:      1. Pre-op chest exam  EKG completed. No acute findings. Check labs today. Will fwd results to Dr Aiden Guzman and [facility] once results received. - EKG 12 lead  - CBC; Future    2. Hyperparathyroidism, primary ([address]  Continue per specialists. 3. Hashimoto's thyroiditis  Continue per specialists. 4. Multinodular goiter  Continue per specialists. 5. Essential hypertension  Stable.      47 y.o. patient approved for Surgery      PLAN:    1. Preoperative workup as follows: ECG, hemoglobin, hematocrit, electrolytes, creatinine, glucose, liver function studies  2.  Change in medication regimen before surgery:Take all medications on morning of surgery with sip of water, and hold all other medications until after surgery  3.  No contraindications to planned surgery    Electronically signed by TROY Thomas CNP on 10/5/2020 at 11:39 AM

## 2020-11-19 NOTE — TELEPHONE ENCOUNTER
PT was prescribed Vit D Calcitrol by Dr. Moise Schwartz. She is out of it and wanted to know if Dr. Danya Mcgill would fill it for her from now on. She is requesting a script be sent to her [facility] [address]. Also, Dr. Moise Schwartz wanted her to have labs drawn for cancer markers and she didn't know if Dr. Grey Lewis would call him and find out what labs she needed and order them for her.  She can be reached at her cell

## 2020-11-23 ENCOUNTER — NURSE TRIAGE (OUTPATIENT)
Dept: OTHER | Facility: CLINIC | Age: 50
End: 2020-11-23

## 2020-11-23 NOTE — TELEPHONE ENCOUNTER
Reason for Disposition   COVID-19 Home Isolation, questions about    Answer Assessment - Initial Assessment Questions  1. COVID-19 DIAGNOSIS: \"Who made your Coronavirus (COVID-19) diagnosis? \" \"Was it confirmed by a positive lab test?\" If not diagnosed by a HCP, ask \"Are there lots of cases (community spread) where you live? \" (See public health department website, if unsure)        No    2. ONSET: \"When did the COVID-19 symptoms start?\"         11/20/2020    3. WORST SYMPTOM: \"What is your worst symptom? \" (e.g., cough, fever, shortness of breath, muscle aches)        Nasal congestion pain in my face    4. COUGH: \"Do you have a cough? \" If so, ask: \"How bad is the cough? \"          Mild    5. FEVER: \"Do you have a fever? \" If so, ask: \"What is your temperature, how was it measured, and when did it start? \"        No    6. RESPIRATORY STATUS: \"Describe your breathing? \" (e.g., shortness of breath, wheezing, unable to speak)         No     7. BETTER-SAME-WORSE: Charlean Ing you getting better, staying the same or getting worse compared to yesterday? \"  If getting worse, ask, \"In what way? \"        Same as yesterday    8. HIGH RISK DISEASE: \"Do you have any chronic medical problems? \" (e.g., asthma, heart or lung disease, weak immune system, etc.)        No     9. PREGNANCY: \"Is there any chance you are pregnant? \" \"When was your last menstrual period? \"        NA, complete hysterectomy    10. OTHER SYMPTOMS: \"Do you have any other symptoms? \"  (e.g., chills, fatigue, headache, loss of smell or taste, muscle pain, sore throat)          Loss of smell and taste, SOB, fatigue, nasal congestion    Protocols used: CORONAVIRUS (COVID-19) DIAGNOSED OR SUSPECTED-ADULT-[place]  Patient called pre-service center [facility]) [address] with red flag complaint.      Brief description of triage: loss of smell and taste, with nasal congestion      Triage indicates for patient to [facility]      Care advice provided, patient verbalizes understanding; denies any other questions or concerns; instructed to call back for any new or worsening symptoms. Attention Provider: Thank you for allowing me to participate in the care of your patient. The patient was connected to triage in response to information provided to the [facility]. Please do not respond through this encounter as the response is not directed to a shared pool.

## 2020-11-23 NOTE — TELEPHONE ENCOUNTER
I left a message for patient that I will order lab work for her upcoming denis in 12/2020. Do 5-7 days before denis. I ordered labs, please mail to patient. Let her know that I included thyroid cancer markers and all tests for calcium. Patient needs to let us know all the doses of medications, calcium and anything else what she is advised to take by Dr. Moise Schwartz, specifically Synthroid, brand or generic, calcitriol, calcium. I will call her back to answer any questions she might have.

## 2020-11-24 RX ORDER — LEVOTHYROXINE SODIUM 175 UG/1
175 TABLET ORAL DAILY
Qty: 30 TABLET | Refills: 1 | Status: SHIPPED
Start: 2020-11-24 | End: 2021-06-23 | Stop reason: DRUGHIGH

## 2020-11-24 RX ORDER — CALCIUM CARBONATE 200(500)MG
TABLET,CHEWABLE ORAL
Qty: 120 TABLET | Refills: 1
Start: 2020-11-24 | End: 2020-12-08 | Stop reason: ALTCHOICE

## 2020-11-24 RX ORDER — CALCITRIOL 0.5 UG/1
0.5 CAPSULE, LIQUID FILLED ORAL DAILY
Qty: 30 CAPSULE | Refills: 1 | Status: SHIPPED | OUTPATIENT
Start: 2020-11-24 | End: 2021-03-18

## 2020-11-24 NOTE — TELEPHONE ENCOUNTER
Mrs. Watt Lesches called back to let Dr. Rea Farias know doses of medications:    Levothyroxine- 175 mg daily  Calcitriol- 0.5 mg daily  Tums- 1000 mg 3-4 times daily

## 2020-12-02 DIAGNOSIS — C73 THYROID CANCER (HCC): ICD-10-CM

## 2020-12-02 DIAGNOSIS — E89.0 POSTOPERATIVE HYPOTHYROIDISM: ICD-10-CM

## 2020-12-02 DIAGNOSIS — E83.51 HYPOCALCEMIA: ICD-10-CM

## 2020-12-02 DIAGNOSIS — E20.89 OTHER HYPOPARATHYROIDISM: ICD-10-CM

## 2020-12-02 LAB
A/G RATIO: 1.9 (ref 1.1–2.2)
ALBUMIN SERPL-MCNC: 4.5 G/DL (ref 3.4–5)
ALP BLD-CCNC: 87 U/L (ref 40–129)
ALT SERPL-CCNC: 37 U/L (ref 10–40)
ANION GAP SERPL CALCULATED.3IONS-SCNC: 14 MMOL/L (ref 3–16)
ANTI-THYROGLOB ABS: 12 IU/ML
AST SERPL-CCNC: 34 U/L (ref 15–37)
BILIRUB SERPL-MCNC: <0.2 MG/DL (ref 0–1)
BUN BLDV-MCNC: 17 MG/DL (ref 7–20)
CALCIUM SERPL-MCNC: 8.3 MG/DL (ref 8.3–10.6)
CHLORIDE BLD-SCNC: 98 MMOL/L (ref 99–110)
CO2: 27 MMOL/L (ref 21–32)
CREAT SERPL-MCNC: 0.7 MG/DL (ref 0.6–1.1)
GFR AFRICAN AMERICAN: >60
GFR NON-AFRICAN AMERICAN: >60
GLOBULIN: 2.4 G/DL
GLUCOSE BLD-MCNC: 92 MG/DL (ref 70–99)
MAGNESIUM: 1.6 MG/DL (ref 1.8–2.4)
PARATHYROID HORMONE INTACT: 5.9 PG/ML (ref 14–72)
PHOSPHORUS: 3.7 MG/DL (ref 2.5–4.9)
POTASSIUM SERPL-SCNC: 3.5 MMOL/L (ref 3.5–5.1)
SODIUM BLD-SCNC: 139 MMOL/L (ref 136–145)
T3 FREE: 2.8 PG/ML (ref 2.3–4.2)
T4 FREE: 1.8 NG/DL (ref 0.9–1.8)
TOTAL PROTEIN: 6.9 G/DL (ref 6.4–8.2)
TSH SERPL DL<=0.05 MIU/L-ACNC: 2.1 UIU/ML (ref 0.27–4.2)
VITAMIN D 25-HYDROXY: 33.8 NG/ML

## 2020-12-05 LAB
CALCIUM IONIZED, CALC AT PH 7.4: 1.12 MMOL/L (ref 1.11–1.3)
CALCIUM IONIZED: 1.07 MMOL/L (ref 1.11–1.3)

## 2020-12-08 ENCOUNTER — OFFICE VISIT (OUTPATIENT)
Dept: ENDOCRINOLOGY | Age: 50
End: 2020-12-08
Payer: COMMERCIAL

## 2020-12-08 VITALS
DIASTOLIC BLOOD PRESSURE: 76 MMHG | BODY MASS INDEX: 45.78 KG/M2 | SYSTOLIC BLOOD PRESSURE: 128 MMHG | HEART RATE: 80 BPM | HEIGHT: 62 IN | OXYGEN SATURATION: 98 % | RESPIRATION RATE: 14 BRPM | WEIGHT: 248.8 LBS

## 2020-12-08 PROBLEM — E89.2 POSTSURGICAL HYPOPARATHYROIDISM (HCC): Status: ACTIVE | Noted: 2020-12-08

## 2020-12-08 PROBLEM — E89.0 HYPOTHYROIDISM, POSTSURGICAL: Status: ACTIVE | Noted: 2020-12-08

## 2020-12-08 PROBLEM — C73 THYROID CANCER (HCC): Status: ACTIVE | Noted: 2020-12-08

## 2020-12-08 PROBLEM — E83.42 HYPOMAGNESEMIA: Status: ACTIVE | Noted: 2020-12-08

## 2020-12-08 PROCEDURE — 99215 OFFICE O/P EST HI 40 MIN: CPT | Performed by: INTERNAL MEDICINE

## 2020-12-08 RX ORDER — CALCIUM CARBONATE 1000 MG/1
TABLET, CHEWABLE ORAL
Qty: 60 TABLET | Refills: 0
Start: 2020-12-08

## 2020-12-08 RX ORDER — MULTIVITAMIN WITH IRON
250 TABLET ORAL 2 TIMES DAILY
Qty: 60 TABLET | Refills: 1
Start: 2020-12-08 | End: 2021-09-27

## 2020-12-08 RX ORDER — ERGOCALCIFEROL (VITAMIN D2) 50 MCG
CAPSULE ORAL
Qty: 30 CAPSULE | Refills: 0
Start: 2020-12-08 | End: 2021-09-27

## 2020-12-08 NOTE — PROGRESS NOTES
left forearm on a [employer] system.         COMPARISON:    None.         HISTORY:    ORDERING SYSTEM PROVIDED HISTORY: Hyperparathyroidism, primary [facility])    TECHNOLOGIST PROVIDED HISTORY:    Reason for exam:->Hyperparathyroidism. Please include non-dominant forearm. Is the patient pregnant?->No         FINDINGS:    LEFT FOREARM:         The BMD of the middle third of the radius of the distal forearm equals 0.64    g/cm2.  The T- and Z-scores are -0.9 and -0.2 standard deviations from the    mean.  This is within the normal range by WHO criteria.         LUMBAR SPINE:         The bone mineral density in the lumbar spine including the L1-L4 levels is    measured at 0.89 g/cm2, which corresponds to a T-score of -1.4 and a Z-score    of -0.6.  This is within the osteopenia range by WHO criteria.         LEFT HIP:         The bone mineral density in the total hip is measured at 0.92 g/cm2    corresponding to a T-score of -0.2 and a Z-score of 0.3.  This is within the    normal range by [facility] criteria.         The bone mineral density of the femoral neck is measured at 0.75 g/cm2    corresponding to a T-score of -0.9 and a Z-score of -0.1.  This is within the    normal range by WHO criteria.         RIGHT HIP:         The bone mineral density in the total hip is measured at 0.90 g/cm2    corresponding to a T-score of -0.3 and a Z-score of 0.2.  This is within the    normal range by WHO criteria.         The bone mineral density of the femoral neck is measured at 0.71 g/cm2    corresponding to a T-score of -1.2 and a Z-score of -0.5.  This is within the    osteopenia range by WHO criteria.         10 year risk for major osteoporotic fracture is 3.8% and risk of a hip    fracture is 0.2%.              Impression    Osteopenia by WHO criteria.                 Past Medical History:   Diagnosis Date    Anxiety     Endometriosis     Hyperlipidemia     Hypertension      Patient Active Problem List    Diagnosis Date Noted    Hypothyroidism, postsurgical 12/08/2020    Postsurgical hypoparathyroidism ([facility] [address] 12/08/2020    Hypomagnesemia 12/08/2020    Thyroid cancer ([facility] [address] 12/08/2020    Multinodular goiter 08/06/2020    Hashimoto's thyroiditis 08/06/2020    Osteopenia 08/01/2020    Class 3 severe obesity with body mass index (BMI) of 45.0 to 49.9 in adult ([facility] [address] 07/07/2020    Hyperparathyroidism, primary ([facility] [address] 07/07/2020    Premature menopause 07/07/2020    Epigastric abdominal pain 06/12/2020    Nausea 06/12/2020    Low back pain without sciatica 06/12/2020    Hypercalcemia 06/12/2020    Diarrhea 06/12/2020    Allergic rhinitis 03/20/2020    Pain of right middle finger 03/20/2020    Anxiety 05/10/2018    Gastroesophageal reflux disease 10/30/2016    Surgical menopause on hormone replacement therapy 12/17/2015    Pre-diabetes 12/17/2015    Hypertriglyceridemia 12/17/2015    HTN (hypertension) 07/01/2015    Colon polyp 03/18/2015     Past Surgical History:   Procedure Laterality Date    ACHILLES TENDON SURGERY Right 6/24/2019    RIGHT ACHILLES TENDON RELEASE AND HEEL EXOSTECTOMY performed by Vinh Gan DPM at [address]  2004   [address] COLONOSCOPY  03/18/2015    Brien GOMEZ    FOOT SURGERY Left 2013    left foot recontstructon    HYSTERECTOMY  2013    complete - vaginal hyst    THYROIDECTOMY      TUBAL LIGATION  2013    with ablation    US THYROID BIOPSY  7/31/2020    US THYROID BIOPSY [facility] ULTRASOUND     Family History   Problem Relation Age of Onset    Diabetes Mother     High Blood Pressure Mother     Stroke Mother     High Blood Pressure Father     Cancer Maternal Aunt 58        breast    Cancer Maternal Grandmother 72        colon cancer    Cancer Paternal Grandmother         lung     Social History     Socioeconomic History    Marital status:      Spouse name: None    Number of children: None    Years of education: None    Highest education level: None   Occupational History    None   Social Needs    Financial resource strain: None    Food insecurity     Worry: None     Inability: None    Transportation needs     Medical: None     Non-medical: None   Tobacco Use    Smoking status: Never Smoker    Smokeless tobacco: Never Used   Substance and Sexual Activity    Alcohol use:  Yes     Alcohol/week: 0.8 standard drinks     Types: 1 Standard drinks or equivalent per week     Comment: social    Drug use: No    Sexual activity: Yes     Partners: Male   Lifestyle    Physical activity     Days per week: None     Minutes per session: None    Stress: None   Relationships    Social connections     Talks on phone: None     Gets together: None     Attends [employer] service: None     Active member of club or organization: None     Attends meetings of clubs or organizations: None     Relationship status: None    Intimate partner violence     Fear of current or ex partner: None     Emotionally abused: None     Physically abused: None     Forced sexual activity: None   Other Topics Concern    None   Social History Narrative    None     Current Outpatient Medications   Medication Sig Dispense Refill    magnesium (MAGNESIUM-OXIDE) 250 MG TABS tablet Take 1 tablet by mouth 2 times daily 60 tablet 1    Vitamin D, Ergocalciferol, 50 MCG (2000 UT) CAPS 1 caps daily 30 capsule 0    Calcium Carbonate Antacid (TUMS ULTRA 1000) 1000 MG CHEW 1 tablet twice daily 60 tablet 0    calcitRIOL (ROCALTROL) 0.5 MCG capsule Take 1 capsule by mouth daily 30 capsule 1    levothyroxine (SYNTHROID) 175 MCG tablet Take 1 tablet by mouth daily 30 tablet 1    pantoprazole (PROTONIX) 40 MG tablet TAKE ONE TABLET BY MOUTH EVERY MORNING BEFORE BREAKFAST 30 tablet 3    fenofibrate (TRICOR) 145 MG tablet TAKE ONE TABLET BY MOUTH DAILY 90 tablet 1    sertraline (ZOLOFT) 25 MG tablet TAKE ONE TABLET BY MOUTH DAILY 90 tablet 1    lisinopril-hydroCHLOROthiazide (PRINZIDE;ZESTORETIC) 10-12.5 MG per tablet TAKE ONE TABLET BY MOUTH DAILY 90 tablet 1    Acetaminophen (TYLENOL ARTHRITIS PAIN PO) Take by mouth PRN      promethazine (PHENERGAN) 25 MG tablet Take 1 tablet by mouth every 6 hours as needed for Nausea 20 tablet 0    Multiple Vitamins-Minerals (THERAPEUTIC MULTIVITAMIN-MINERALS) tablet Take 1 tablet by mouth daily      fluticasone (FLONASE) 50 MCG/ACT nasal spray 2 sprays by Nasal route daily. 1 Bottle 3     No current facility-administered medications for this visit.       Allergies   Allergen Reactions    Sulfa Antibiotics Rash     Family Status   Relation Name Status    Mother  Alive    Father  Alive    [address]  (Not Specified)    MGM  (Not Specified)    Sister  Alive    Brother  Alive    PGM  (Not Specified)   [facility]   at age 32        Down Syndrome       Review of Systems:  Constitutional: has fatigue, no fever, no recent weight gain, no recent weight loss, no changes in appetite  Eyes: no eye pain, no change in vision, no eye redness, no eye irritation, no double vision  Ears, nose, throat: no nasal congestion, no sore throat, no earache, no decrease in hearing, no hoarseness, no dry mouth, no sinus problems, no difficulty swallowing, no neck lumps, no dental problems, no mouth sores, no ringing in ears  Pulmonary: no shortness of breath, no wheezing, no dyspnea on exertion, no cough  Cardiovascular: no chest pain, no lower extremity edema, no orthopnea, no intermittent leg claudication, no palpitations  Gastrointestinal: has abdominal pain, no nausea, no vomiting, has diarrhea, has constipation, no dysphagia, has heartburn, no bloating  Genitourinary: no dysuria, no urinary incontinence, no urinary hesitancy, no urinary frequency, no feelings of urinary urgency, no nocturia  Musculoskeletal: no joint swelling, no joint stiffness, has joint pain, has muscle cramps, no muscle pain, no bone pain  Integument/Breast: no hair loss, no skin rashes, masses  Musculoskeletal: normal gait and station and exam of the digits and nails are normal  Neurological: normal coordination and normal general cortical function      Lab Review:    Lab Results   Component Value Date    WBC 6.4 10/05/2020    HGB 12.5 10/05/2020    HCT 37.6 10/05/2020    MCV 81.3 10/05/2020     10/05/2020     Lab Results   Component Value Date     12/02/2020    K 3.5 12/02/2020    CL 98 12/02/2020    CO2 27 12/02/2020    BUN 17 12/02/2020    CREATININE 0.7 12/02/2020    GLUCOSE 92 12/02/2020    CALCIUM 8.3 12/02/2020    PROT 6.9 12/02/2020    LABALBU 4.5 12/02/2020    BILITOT <0.2 12/02/2020    ALKPHOS 87 12/02/2020    AST 34 12/02/2020    ALT 37 12/02/2020    LABGLOM >60 12/02/2020    GFRAA >60 12/02/2020    AGRATIO 1.9 12/02/2020    GLOB 2.4 12/02/2020     Lab Results   Component Value Date    TSH 2.10 12/02/2020    FT3 2.8 12/02/2020     Lab Results   Component Value Date    LABA1C 5.9 06/03/2020     Lab Results   Component Value Date    .6 06/03/2020     Lab Results   Component Value Date    CHOL 165 06/03/2020     Lab Results   Component Value Date    TRIG 221 06/03/2020     Lab Results   Component Value Date    HDL 27 06/03/2020     Lab Results   Component Value Date    LDLCALC 94 06/03/2020     Lab Results   Component Value Date    LABVLDL 44 06/03/2020     No results found for: [facility]  Lab Results   Component Value Date    LABMICR YES 06/05/2020     Lab Results   Component Value Date    VITD25 33.8 12/02/2020        ASSESSMENT/PLAN:  1.  Hyperparathyroidism, primary   TSH 2.1  Subtotal parathyroidectomy-right superior, left superior, left inferior 10/16/2020   Total thyroidectomy    24-hour urine calcium 218  Creatinine clearance 95  Calcium 11.3-10.2 (ULN 10.6)-8.3  Albumin 4.5  Ionized calcium 1.34-1.07  25 hydroxy vitamin D 42.2  PTH 38-44.9-5.9  The serum protein electrophoresis is unremarkable.  No monoclonal band   is seen on serum protein electrophoresis  - pNX  Comments      Second small focus of papillary thyroid carcinoma is present in the  right lobe measuring 3 mm. - US Head Neck Soft Tissue Thyroid; Future  -Thyroglobulin  -Thyroglobulin antibody    3. Class 3 severe obesity with body mass index (BMI) of 45.0 to 49.9 in adult, unspecified obesity type, unspecified whether serious comorbidity present (HCC)  Diet and exercise  Since age 38.    3. Premature menopause  Calcium and vitamin D supplementation  DEXA scan    5. Hashimoto's thyroiditis  TSH 1.66    6. Osteopenia  Calcium, vitamin D supplementation was discussed, written instructions were given how to calculate calcium in diet  Counseled patient on risk fracture  10 year risk for major osteoporotic fracture is 3.8% and risk of a hip    fracture is 0.2%. 7.  Postsurgical hypothyroidism  Continue levothyroxine 0.175 mg daily  Referred for I-131 treatment  Adjustments after treatment as indicated  Total thyroidectomy 10/16/2020  TSH 2.1  -TSH  -Free T4  -Free T3  -Thyroglobulin  -Thyroglobulin antibody    8. Postsurgical hypoparathyroidism  1000 mg Tums bid  Calcitriol 0.5 mcg qd  Not on vitamin D  Start Vitamin D3 2000 IU   PTH 5.9  -CMP  -PTH  -Ionized calcium  -Phosphorus  -Magnesium  -25 hydroxy vitamin D    9.  Hypomagnesia  Magnesium 250 mg bid  Magnesium 1.8  -Magnesium    Reviewed and/or ordered clinical lab results Yes  Reviewed and/or ordered radiology tests Yes   Reviewed and/or ordered other diagnostic tests No  Discussed test results with performing physician No  Independently reviewed image, tracing, or specimen No  Made a decision to obtain old records No  Reviewed and summarized old records Yes   Calcium elevated since 2015, later upper limit of normal, in 2020 elevated again  PTH 38  Calcium 11.1-10.5-10.2-10.1-10.8-11.3, upper limit normal 10.6  TSH 1.4  Obtained history from other than patient No    Mackenzie Dawson was counseled regarding symptoms of primary hyperparathyroidism, thyroid cancer, postsurgical hypothyroidism, postsurgical hypoparathyroidism, hypomagnesemia, premature menopause diagnosis, course and complications of disease if inadequately treated, side effects of medications, diagnosis, treatment options, and prognosis, risks, benefits, complications, and alternatives of treatment, labs, imaging and other studies and treatment targets and goals, thyroid cancer management, surveillance, recurrence prevention, hypoparathyroidism management, follow-up and monitoring, medication adjustments    She understands instructions and counseling. Total visit time 40 minutes, more than 50% was face-to-face counseling time  See assessment, plan and counseling note for details    Return in about 1 month (around 1/8/2021) for thyroid problems, hypoparathyroidism.     Electronically signed by Jorge Bennett MD on 12/8/2020 at 11:00 PM

## 2020-12-09 ENCOUNTER — TELEPHONE (OUTPATIENT)
Dept: ENDOCRINOLOGY | Age: 50
End: 2020-12-09

## 2020-12-10 LAB — THYROGLOBULIN BY LC-MS/MS, SERUM/PLASMA: 0.6 NG/ML (ref 1.3–31.8)

## 2021-01-06 DIAGNOSIS — E66.01 CLASS 3 SEVERE OBESITY WITH BODY MASS INDEX (BMI) OF 45.0 TO 49.9 IN ADULT, UNSPECIFIED OBESITY TYPE, UNSPECIFIED WHETHER SERIOUS COMORBIDITY PRESENT (HCC): ICD-10-CM

## 2021-01-06 DIAGNOSIS — E06.3 HASHIMOTO'S THYROIDITIS: ICD-10-CM

## 2021-01-06 DIAGNOSIS — E89.0 HYPOTHYROIDISM, POSTSURGICAL: ICD-10-CM

## 2021-01-06 DIAGNOSIS — E21.0 HYPERPARATHYROIDISM, PRIMARY (HCC): ICD-10-CM

## 2021-01-06 DIAGNOSIS — E28.319 PREMATURE MENOPAUSE: ICD-10-CM

## 2021-01-06 DIAGNOSIS — M85.80 OSTEOPENIA, UNSPECIFIED LOCATION: ICD-10-CM

## 2021-01-06 LAB
A/G RATIO: 1.9 (ref 1.1–2.2)
ALBUMIN SERPL-MCNC: 4.5 G/DL (ref 3.4–5)
ALP BLD-CCNC: 83 U/L (ref 40–129)
ALT SERPL-CCNC: 33 U/L (ref 10–40)
ANION GAP SERPL CALCULATED.3IONS-SCNC: 13 MMOL/L (ref 3–16)
AST SERPL-CCNC: 31 U/L (ref 15–37)
BILIRUB SERPL-MCNC: <0.2 MG/DL (ref 0–1)
BUN BLDV-MCNC: 18 MG/DL (ref 7–20)
CALCIUM SERPL-MCNC: 8.7 MG/DL (ref 8.3–10.6)
CHLORIDE BLD-SCNC: 99 MMOL/L (ref 99–110)
CO2: 27 MMOL/L (ref 21–32)
CREAT SERPL-MCNC: 0.8 MG/DL (ref 0.6–1.1)
GFR AFRICAN AMERICAN: >60
GFR NON-AFRICAN AMERICAN: >60
GLOBULIN: 2.4 G/DL
GLUCOSE BLD-MCNC: 114 MG/DL (ref 70–99)
MAGNESIUM: 1.7 MG/DL (ref 1.8–2.4)
PHOSPHORUS: 2.8 MG/DL (ref 2.5–4.9)
POTASSIUM SERPL-SCNC: 3.6 MMOL/L (ref 3.5–5.1)
SODIUM BLD-SCNC: 139 MMOL/L (ref 136–145)
T3 FREE: 2.7 PG/ML (ref 2.3–4.2)
T4 FREE: 1.9 NG/DL (ref 0.9–1.8)
TOTAL PROTEIN: 6.9 G/DL (ref 6.4–8.2)
TSH SERPL DL<=0.05 MIU/L-ACNC: 2.4 UIU/ML (ref 0.27–4.2)

## 2021-01-07 LAB
ANTI-THYROGLOB ABS: 13 IU/ML
PARATHYROID HORMONE INTACT: 8.5 PG/ML (ref 14–72)
VITAMIN D 25-HYDROXY: 41.5 NG/ML

## 2021-01-09 LAB
CALCIUM IONIZED, CALC AT PH 7.4: 1.15 MMOL/L (ref 1.11–1.3)
CALCIUM IONIZED: 1.13 MMOL/L (ref 1.11–1.3)

## 2021-01-10 LAB — THYROGLOBULIN BY LC-MS/MS, SERUM/PLASMA: <0.5 NG/ML (ref 1.3–31.8)

## 2021-01-14 ENCOUNTER — OFFICE VISIT (OUTPATIENT)
Dept: ENDOCRINOLOGY | Age: 51
End: 2021-01-14
Payer: COMMERCIAL

## 2021-01-14 VITALS
TEMPERATURE: 97 F | OXYGEN SATURATION: 98 % | DIASTOLIC BLOOD PRESSURE: 78 MMHG | WEIGHT: 249 LBS | HEIGHT: 62 IN | BODY MASS INDEX: 45.82 KG/M2 | HEART RATE: 72 BPM | SYSTOLIC BLOOD PRESSURE: 136 MMHG

## 2021-01-14 DIAGNOSIS — C73 THYROID CANCER (HCC): ICD-10-CM

## 2021-01-14 DIAGNOSIS — E66.01 CLASS 3 SEVERE OBESITY WITH BODY MASS INDEX (BMI) OF 45.0 TO 49.9 IN ADULT, UNSPECIFIED OBESITY TYPE, UNSPECIFIED WHETHER SERIOUS COMORBIDITY PRESENT (HCC): ICD-10-CM

## 2021-01-14 DIAGNOSIS — M85.80 OSTEOPENIA, UNSPECIFIED LOCATION: ICD-10-CM

## 2021-01-14 DIAGNOSIS — E83.42 HYPOMAGNESEMIA: ICD-10-CM

## 2021-01-14 DIAGNOSIS — E06.3 HASHIMOTO'S THYROIDITIS: ICD-10-CM

## 2021-01-14 DIAGNOSIS — E89.2 POSTSURGICAL HYPOPARATHYROIDISM (HCC): ICD-10-CM

## 2021-01-14 DIAGNOSIS — E89.0 HYPOTHYROIDISM, POSTSURGICAL: ICD-10-CM

## 2021-01-14 DIAGNOSIS — E28.319 PREMATURE MENOPAUSE: ICD-10-CM

## 2021-01-14 DIAGNOSIS — E21.0 HYPERPARATHYROIDISM, PRIMARY (HCC): Primary | ICD-10-CM

## 2021-01-14 PROCEDURE — 99215 OFFICE O/P EST HI 40 MIN: CPT | Performed by: INTERNAL MEDICINE

## 2021-01-14 NOTE — PROGRESS NOTES
SUBJECTIVE:  Erica Perez is a 45 y.o. female who is being evaluated for hyperparathyroidism. 1. Hyperparathyroidism, primary   This started in 2020. Patient was diagnosed with hypercalcemia. The problem has been gradually worsening. Patient started medication in 2020. Currently patient is on: N/A. Misses  N/A doses a month. Current complaints: fatigue, easy gets hot  No history of kidney stones  No persistent constipation, aches, depression  Subtotal parathyroidectomy 10/16/2020, right superior, left superior, left inferior    2. Thyroid cancer  Will have I-131 Tx. Scheduled. Thyroid, total thyroidectomy:  -  Multifocal papillary thyroid carcinoma, classic type, involving the left  lobe (1.1 x 0.8 x 0.7 cm) and right lobe (3 mm). -  Negative for extrathyroidal extension.  -  Background thyroid with chronic lymphocytic thyroiditis and nodular  thyroid hyperplasia with adenomatoid nodules. -  Surgical resection margins are free of malignancy. History of obstructive symptoms: difficulty swallowing No, changes in voice/hoarseness No.  History of radiation to patient's neck: No  Resent iodine exposure: No  Family history includes no thyroid abnormalities. Family history of thyroid cancer: No    3. Premature menopause  Menopause since age 38  Was on Estrogen    4. Class 3 severe obesity with body mass index (BMI) of 45.0 to 49.9 in adult, unspecified obesity type, unspecified whether serious comorbidity present ([address]  He is moderately healthy, active    5. Hashimoto's thyroiditis  Has fatigue    6. Osteopenia  Calcium in diet  No bone pain  10 year risk for major osteoporotic fracture is 3.8% and risk of a hip    fracture is 0.2%. 7.  Postsurgical hypoparathyroidism  Has numbness, tingling in hands. Not in face anymore, had after surgery    8.  Hypomagnesia  Has fatigue    EXAMINATION:    BONE DENSITOMETRY         7/31/2020 8:26 am         TECHNIQUE:    A bone density DEXA scan was performed of Problem List    Diagnosis Date Noted    Hypothyroidism, postsurgical 12/08/2020    Postsurgical hypopar[facility]roidism ([facility] 12/08/2020    Hypomagnesemia 12/08/2020    Thyroid cancer ([facility] 12/08/2020    Multinodular goiter 08/06/2020    Hashimoto's thyroiditis 08/06/2020    Osteopenia 08/01/2020    Class 3 severe obesity with body mass index (BMI) of 45.0 to 49.9 in adult ([facility] 07/07/2020    Hyperparathyroidism, primary ([facility] 07/07/2020    Premature menopause 07/07/2020    Epigastric abdominal pain 06/12/2020    Nausea 06/12/2020    Low back pain without sciatica 06/12/2020    Hypercalcemia 06/12/2020    Diarrhea 06/12/2020    Allergic rhinitis 03/20/2020    Pain of right middle finger 03/20/2020    Anxiety 05/10/2018    Gastroesophageal reflux disease 10/30/2016    Surgical menopause on hormone replacement therapy 12/17/2015    Pre-diabetes 12/17/2015    Hypertriglyceridemia 12/17/2015    HTN (hypertension) 07/01/2015    Colon polyp 03/18/2015     Past Surgical History:   Procedure Laterality Date    ACHILLES TENDON SURGERY Right 6/24/2019    RIGHT ACHILLES TENDON RELEASE AND HEEL EXOSTECTOMY performed by Katelin Chua DPM at [address]  2004   [address] COLONOSCOPY  03/18/2015    Brien GOMEZ    FOOT SURGERY Left 2013    left foot recontstructon    HYSTERECTOMY  2013    complete - vaginal hyst    THYROIDECTOMY      TUBAL LIGATION  2013    with ablation    US THYROID BIOPSY  7/31/2020    US THYROID BIOPSY [facility] ULTRASOUND     Family History   Problem Relation Age of Onset    Diabetes Mother     High Blood Pressure Mother     Stroke Mother     High Blood Pressure Father     Cancer Maternal Aunt 58        breast    Cancer Maternal Grandmother 72        colon cancer    Cancer Paternal Grandmother         lung     Social History     Socioeconomic History    Marital status:      Spouse name: None    Number of children: None    Years of education: None    Highest education level: None   Occupational History    None   Social Needs    Financial resource strain: None    Food insecurity     Worry: None     Inability: None    Transportation needs     Medical: None     Non-medical: None   Tobacco Use    Smoking status: Never Smoker    Smokeless tobacco: Never Used   Substance and Sexual Activity    Alcohol use:  Yes     Alcohol/week: 0.8 standard drinks     Types: 1 Standard drinks or equivalent per week     Comment: social    Drug use: No    Sexual activity: Yes     Partners: Male   Lifestyle    Physical activity     Days per week: None     Minutes per session: None    Stress: None   Relationships    Social connections     Talks on phone: None     Gets together: None     Attends Taoist service: None     Active member of club or organization: None     Attends meetings of clubs or organizations: None     Relationship status: None    Intimate partner violence     Fear of current or ex partner: None     Emotionally abused: None     Physically abused: None     Forced sexual activity: None   Other Topics Concern    None   Social History Narrative    None     Current Outpatient Medications   Medication Sig Dispense Refill    magnesium (MAGNESIUM-OXIDE) 250 MG TABS tablet Take 1 tablet by mouth 2 times daily 60 tablet 1    Vitamin D, Ergocalciferol, 50 MCG (2000 UT) CAPS 1 caps daily 30 capsule 0    Calcium Carbonate Antacid (TUMS ULTRA 1000) 1000 MG CHEW 1 tablet twice daily 60 tablet 0    calcitRIOL (ROCALTROL) 0.5 MCG capsule Take 1 capsule by mouth daily 30 capsule 1    levothyroxine (SYNTHROID) 175 MCG tablet Take 1 tablet by mouth daily 30 tablet 1    pantoprazole (PROTONIX) 40 MG tablet TAKE ONE TABLET BY MOUTH EVERY MORNING BEFORE BREAKFAST 30 tablet 3    fenofibrate (TRICOR) 145 MG tablet TAKE ONE TABLET BY MOUTH DAILY 90 tablet 1    sertraline (ZOLOFT) 25 MG tablet TAKE ONE TABLET BY MOUTH DAILY 90 tablet 1    lisinopril-hydroCHLOROthiazide (PRINZIDE;ZESTORETIC) 10-12.5 MG per tablet TAKE ONE TABLET BY MOUTH DAILY 90 tablet 1    Acetaminophen (TYLENOL ARTHRITIS PAIN PO) Take by mouth PRN      promethazine (PHENERGAN) 25 MG tablet Take 1 tablet by mouth every 6 hours as needed for Nausea 20 tablet 0    Multiple Vitamins-Minerals (THERAPEUTIC MULTIVITAMIN-MINERALS) tablet Take 1 tablet by mouth daily      fluticasone (FLONASE) 50 MCG/ACT nasal spray 2 sprays by Nasal route daily. 1 Bottle 3     No current facility-administered medications for this visit.       Allergies   Allergen Reactions    Sulfa Antibiotics Rash     Family Status   Relation Name Status    Mother  Alive    Father  Alive    [address]  (Not Specified)    MGM  (Not Specified)    Sister  Alive    Brother  Alive    PGM  (Not Specified)   Soraya Sit Sister   at age 32        Down Syndrome       Review of Systems:  Constitutional: has fatigue, no fever, no recent weight gain, no recent weight loss, no changes in appetite  Eyes: no eye pain, no change in vision, no eye redness, no eye irritation, no double vision  Ears, nose, throat: no nasal congestion, no sore throat, no earache, no decrease in hearing, no hoarseness, no dry mouth, no sinus problems, no difficulty swallowing, no neck lumps, no dental problems, no mouth sores, no ringing in ears  Pulmonary: no shortness of breath, no wheezing, no dyspnea on exertion, no cough  Cardiovascular: no chest pain, no lower extremity edema, no orthopnea, no intermittent leg claudication, no palpitations  Gastrointestinal: has abdominal pain, no nausea, no vomiting, has diarrhea, has constipation, no dysphagia, has heartburn, no bloating  Genitourinary: no dysuria, no urinary incontinence, no urinary hesitancy, no urinary frequency, no feelings of urinary urgency, no nocturia  Musculoskeletal: no joint swelling, no joint stiffness, has joint pain, has muscle cramps, no muscle pain, no bone pain  Integument/Breast: no hair loss, no skin rashes, no skin lesions, no itching, no dry skin  Neurological: no numbness, no tingling, no weakness, no confusion, no headaches, no dizziness, no fainting, no tremors, no decrease in memory, no balance problems  Psychiatric: no anxiety, no depression, no insomnia  Hematologic/Lymphatic: no tendency for easy bleeding, no swollen lymph nodes, no tendency for easy bruising  Immunology: has seasonal allergies, no frequent infections, no frequent illnesses  Endocrine: has temperature intolerance    /78   Pulse 72   Temp 97 °F (36.1 °C)   Ht 5' 2\" (1.575 m)   Wt 249 lb (112.9 kg)   SpO2 98%   BMI 45.54 kg/m²    Wt Readings from Last 3 Encounters:   01/14/21 249 lb (112.9 kg)   12/08/20 248 lb 12.8 oz (112.9 kg)   10/05/20 247 lb 9.6 oz (112.3 kg)     Body mass index is 45.54 kg/m².     OBJECTIVE:  Constitutional: no acute distress, well appearing and well nourished  Psychiatric: oriented to person, place and time, judgement and insight and normal, recent and remote memory and intact and mood and affect are normal  Skin: skin and subcutaneous tissue is normal without mass, normal turgor  Head and Face: examination of head and face revealed no abnormalities  Eyes: no lid or conjunctival swelling, erythema or discharge, pupils are normal, equal, round, reactive to light  Ears/Nose: external inspection of ears and nose revealed no abnormalities, hearing is grossly normal  Oropharynx/Mouth/Face: lips, tongue and gums are normal with no lesions, the voice quality was normal  Neck: neck is supple and symmetric, with midline trachea and no masses, thyroid is absent, no palpable masses  Lymphatics: normal cervical lymph nodes, normal supraclavicular nodes  Pulmonary: no increased work of breathing or signs of respiratory distress, lungs are clear to auscultation  Cardiovascular: normal heart rate and rhythm, normal S1 and S2, no murmurs and pedal pulses and 2+ bilaterally, No edema  Abdomen: abdomen is soft, non-tender with no masses  Musculoskeletal: normal gait and station and exam of the digits and nails are normal  Neurological: normal coordination and normal general cortical function      Lab Review:    Lab Results   Component Value Date    WBC 6.4 10/05/2020    HGB 12.5 10/05/2020    HCT 37.6 10/05/2020    MCV 81.3 10/05/2020     10/05/2020     Lab Results   Component Value Date     01/06/2021    K 3.6 01/06/2021    CL 99 01/06/2021    CO2 27 01/06/2021    BUN 18 01/06/2021    CREATININE 0.8 01/06/2021    GLUCOSE 114 01/06/2021    CALCIUM 8.7 01/06/2021    PROT 6.9 01/06/2021    LABALBU 4.5 01/06/2021    BILITOT <0.2 01/06/2021    ALKPHOS 83 01/06/2021    AST 31 01/06/2021    ALT 33 01/06/2021    LABGLOM >60 01/06/2021    GFRAA >60 01/06/2021    AGRATIO 1.9 01/06/2021    GLOB 2.4 01/06/2021     Lab Results   Component Value Date    TSH 2.40 01/06/2021    FT3 2.7 01/06/2021     Lab Results   Component Value Date    LABA1C 5.9 06/03/2020     Lab Results   Component Value Date    .6 06/03/2020     Lab Results   Component Value Date    CHOL 165 06/03/2020     Lab Results   Component Value Date    TRIG 221 06/03/2020     Lab Results   Component Value Date    HDL 27 06/03/2020     Lab Results   Component Value Date    LDLCALC 94 06/03/2020     Lab Results   Component Value Date    LABVLDL 44 06/03/2020     No results found for: [facility]  Lab Results   Component Value Date    LABMICR YES 06/05/2020     Lab Results   Component Value Date    VITD25 41.5 01/06/2021        ASSESSMENT/PLAN:  1.  Hyperparathyroidism, primary   Calcium 8.7  Albumin 4.5  PTH 8.5  Magnesium 1.7  25 hydroxy vitamin D 41.5  TSH 2.1  Subtotal parathyroidectomy-right superior, left superior, left inferior 10/16/2020   Total thyroidectomy  Calcium 11.3-10.2 (ULN 10.6)-8.3-8.7  Albumin 4.5  Ionized calcium 1.34-1.07  25 hydroxy vitamin D 42.2  PTH 38-44.9-5.9  - Vitamin D 25 Hydroxy; Future  - Calcium Ionized Serum; Future  - PTH, Intact; Future    2. Thyroid cancer  Thyroglobulin less than 0.5  Thyroglobulin antibody 13  Refer for nuclear medicine preablation work-up, I-131 treatment and posttherapy scan  Total thyroidectomy 10/16/2020  [id number]  Pre-Operative Diagnosis: Multinodular goiter, hypercalcemia  Post-Operative Diagnosis:     Same  Specimen(s) Submitted:   A. Rt. superior parathyroid; B. Lt. superior  parathyroid; C. Total thyroid; D. Lt. inferior parathyroid  A.  Right superior parathyroid, parathyroidectomy:  -  Hypercellular parathyroid gland (35 mg). B.  Left superior parathryoid, parathyroidectomy:  -  Enlarged, hypercellular parathyroid (190 mg). C.  Thyroid, total thyroidectomy:  -  Multifocal papillary thyroid carcinoma, classic type, involving the left  lobe (1.1 x 0.8 x 0.7 cm) and right lobe (3 mm). -  Negative for extrathyroidal extension.  -  Background thyroid with chronic lymphocytic thyroiditis and nodular  thyroid hyperplasia with adenomatoid nodules. -  Surgical resection margins are free of malignancy. Jailene Ferrera synoptic report for details. D.  Left inferior parathyroid, parathyroidectomy:  -  Benign parathyroid tissue (20 mg). SYNOPTIC REPORT:  Report Name: Thyroid Gland - Resection,  Version: [de-identified],   Inst#: 1  Specimen    Procedure:  Total thyroidectomy  Tumor    Tumor Focality: Multifocal    Tumor Characteristics      Tumor Site: Left lobe      Histologic Type: Papillary carcinoma, classic (usual,                       conventional)      Tumor Size: 1.1 x 0.8 x 0.7 Centimeters (cm)      Extrathyroidal Extension: Not identified      Angioinvasion (vascular invasion): Not identified      Lymphatic Invasion: Not identified      Margins: Uninvolved by carcinoma  Lymph Nodes    Regional Lymph Nodes: No lymph nodes submitted or found  Pathologic Stage Classification (pTNM, AJCC 8th Edition)    TNM Descriptors: m (multiple primary tumors)    Primary Tumor (pT): pT1b    Regional Lymph Nodes (pN): pNX  Comments      Second small focus of papillary thyroid carcinoma is present in the  right lobe measuring 3 mm. - US Head Neck Soft Tissue Thyroid; Future  -Thyroglobulin  -Thyroglobulin antibody    3. Class 3 severe obesity with body mass index (BMI) of 45.0 to 49.9 in adult, unspecified obesity type, unspecified whether serious comorbidity present (HCC)  Diet and exercise  Since age 38.    3. Premature menopause  Calcium and vitamin D supplementation  DEXA scan    5. Hashimoto's thyroiditis  TSH 1.66    6. Osteopenia  Calcium, vitamin D supplementation was discussed, written instructions were given how to calculate calcium in diet  Counseled patient on risk fracture  10 year risk for major osteoporotic fracture is 3.8% and risk of a hip    fracture is 0.2%. 7.  Postsurgical hypothyroidism  TSH 2.4, will need further adjustments of levothyroxine  Continue levothyroxine 0.175 mg daily  Referred for I-131 treatment  Adjustments after treatment as indicated  Total thyroidectomy 10/16/2020  TSH 2.1  -TSH  -Free T4  -Free T3  -Thyroglobulin  -Thyroglobulin antibody    8. Postsurgical hypoparathyroidism  1000 mg Tums bid  Calcitriol 0.5 mcg qd  Continue Vitamin D3 2000 IU   PTH 5.9  -CMP  -PTH  -Ionized calcium  -Phosphorus  -Magnesium  -25 hydroxy vitamin D    9. Hypomagnesia  Uncontrolled  Magnesium 250 mg bid. Was only taking one.   Magnesium 1.8  -Magnesium    Reviewed and/or ordered clinical lab results Yes  Reviewed and/or ordered radiology tests Yes   Reviewed and/or ordered other diagnostic tests No  Discussed test results with performing physician No  Independently reviewed image, tracing, or specimen No  Made a decision to obtain old records No  Reviewed and summarized old records Yes   Calcium elevated since 2015, later upper limit of normal, in 2020 elevated again  PTH 38  Calcium 11.1-10.5-10.2-10.1-10.8-11.3, upper limit normal 10.6  TSH

## 2021-01-29 LAB
THYROGLOBULIN: <0.1 NG/ML (ref 1.6–50)
TSH, 3RD GENERATION: 130.15 UIU/ML (ref 0.45–4.12)

## 2021-03-02 ENCOUNTER — OFFICE VISIT (OUTPATIENT)
Dept: PRIMARY CARE CLINIC | Age: 51
End: 2021-03-02
Payer: COMMERCIAL

## 2021-03-02 VITALS
TEMPERATURE: 97.3 F | OXYGEN SATURATION: 98 % | DIASTOLIC BLOOD PRESSURE: 78 MMHG | WEIGHT: 253 LBS | HEART RATE: 90 BPM | RESPIRATION RATE: 16 BRPM | BODY MASS INDEX: 46.27 KG/M2 | SYSTOLIC BLOOD PRESSURE: 106 MMHG

## 2021-03-02 DIAGNOSIS — R73.03 PREDIABETES: ICD-10-CM

## 2021-03-02 DIAGNOSIS — R06.83 SNORING: ICD-10-CM

## 2021-03-02 DIAGNOSIS — E78.2 MIXED HYPERLIPIDEMIA: ICD-10-CM

## 2021-03-02 DIAGNOSIS — F41.9 ANXIETY: ICD-10-CM

## 2021-03-02 DIAGNOSIS — I10 ESSENTIAL HYPERTENSION: Primary | ICD-10-CM

## 2021-03-02 DIAGNOSIS — K21.9 GASTROESOPHAGEAL REFLUX DISEASE, UNSPECIFIED WHETHER ESOPHAGITIS PRESENT: ICD-10-CM

## 2021-03-02 DIAGNOSIS — J30.9 ALLERGIC RHINITIS, UNSPECIFIED SEASONALITY, UNSPECIFIED TRIGGER: ICD-10-CM

## 2021-03-02 PROCEDURE — 99214 OFFICE O/P EST MOD 30 MIN: CPT | Performed by: INTERNAL MEDICINE

## 2021-03-02 ASSESSMENT — PATIENT HEALTH QUESTIONNAIRE - PHQ9
1. LITTLE INTEREST OR PLEASURE IN DOING THINGS: 0
SUM OF ALL RESPONSES TO PHQ QUESTIONS 1-9: 0
SUM OF ALL RESPONSES TO PHQ QUESTIONS 1-9: 0
SUM OF ALL RESPONSES TO PHQ9 QUESTIONS 1 & 2: 0

## 2021-03-02 NOTE — PROGRESS NOTES
Mackenzie Delacruz   Date ofBirth:  1970    Date of Visit:  3/2/2021    Chief Complaint   Patient presents with    Hypertension    Hyperlipidemia    Anxiety    Gastroesophageal Reflux    Other     Prediabetes    Allergic Rhinitis     Snoring       HPI  Hypertension- Patient takes Lisinopril-HCTZ 10-12.5mg po q day. Patient decreases salt. Patient is not exercising.     Hyperlipidemia-  Patient takes Fenofibrate 145mg po q day. Pt decreases fat and cholesterol. Pt is not exercising.     Prediabetes- Patient states she is terrible about decreasing carbohydrates.     GERD- Patient takes Protonix 40mg po bid. Patient states she had reflux after eating pizza on Sunday night early Monday morning. Patient has taken Tums as needed.     Allergic Rhinitis- Patient takes Flonase nasal spray and generic Claritin as needed. Patient denies symptoms at present.     Anxiety-Patient takes Sertraline 25mg po q day. Patient states her anxiety is a little higher lately due to thyroid cancer and getting thyroid under control. Patient states it is not so bad she can't handle it. Patient states once in awhile she feels panicky. Snoring- Patient states she was told she was snoring under anesthesia. Patient states her daughter complains about her snoring and her hurband notices it too. Review of Systems   Constitutional: Negative for appetite change, chills, fatigue, fever and unexpected weight change. HENT: Negative for congestion, ear pain, postnasal drip, rhinorrhea, sinus pressure, sneezing, sore throat and trouble swallowing. Eyes: Negative for visual disturbance. Respiratory: Negative for cough, chest tightness, shortness of breath and wheezing. Cardiovascular: Negative for chest pain, palpitations and leg swelling. Gastrointestinal: Negative for abdominal pain, blood in stool, constipation, diarrhea, nausea and vomiting. Endocrine: Negative for cold intolerance and heat intolerance. Genitourinary: Negative for dysuria, frequency and hematuria. Musculoskeletal: Negative for arthralgias and myalgias. Skin: Negative for rash. Neurological: Negative for dizziness, tremors, syncope, weakness, light-headedness, numbness and headaches. Psychiatric/Behavioral: Negative for decreased concentration, dysphoric mood and sleep disturbance. The patient is nervous/anxious. Allergies   Allergen Reactions    Sulfa Antibiotics Rash     Outpatient Medications Marked as Taking for the 3/2/21 encounter (Office Visit) with Mannie Delgado MD   Medication Sig Dispense Refill    magnesium (MAGNESIUM-OXIDE) 250 MG TABS tablet Take 1 tablet by mouth 2 times daily (Patient taking differently: Take 250 mg by mouth daily ) 60 tablet 1    Vitamin D, Ergocalciferol, 50 MCG (2000 UT) CAPS 1 caps daily 30 capsule 0    Calcium Carbonate Antacid (TUMS ULTRA 1000) 1000 MG CHEW 1 tablet twice daily 60 tablet 0    calcitRIOL (ROCALTROL) 0.5 MCG capsule Take 1 capsule by mouth daily 30 capsule 1    levothyroxine (SYNTHROID) 175 MCG tablet Take 1 tablet by mouth daily 30 tablet 1    pantoprazole (PROTONIX) 40 MG tablet TAKE ONE TABLET BY MOUTH EVERY MORNING BEFORE BREAKFAST (Patient taking differently: 2 times daily ) 30 tablet 3    fenofibrate (TRICOR) 145 MG tablet TAKE ONE TABLET BY MOUTH DAILY 90 tablet 1    sertraline (ZOLOFT) 25 MG tablet TAKE ONE TABLET BY MOUTH DAILY 90 tablet 1    lisinopril-hydroCHLOROthiazide (PRINZIDE;ZESTORETIC) 10-12.5 MG per tablet TAKE ONE TABLET BY MOUTH DAILY 90 tablet 1    Acetaminophen (TYLENOL ARTHRITIS PAIN PO) Take by mouth PRN      promethazine (PHENERGAN) 25 MG tablet Take 1 tablet by mouth every 6 hours as needed for Nausea 20 tablet 0    Multiple Vitamins-Minerals (THERAPEUTIC MULTIVITAMIN-MINERALS) tablet Take 1 tablet by mouth daily      fluticasone (FLONASE) 50 MCG/ACT nasal spray 2 sprays by Nasal route daily.  1 Bottle 3         Vitals:    03/02/21 1607 BP: 106/78   Pulse: 90   Resp: 16   Temp: 97.3 °F (36.3 °C)   SpO2: 98%   Weight: 253 lb (114.8 kg)     Body mass index is 46.27 kg/m². Physical Exam  Nursing note reviewed. Constitutional:       General: She is not in acute distress. Appearance: Normal appearance. She is well-developed. HENT:      Right Ear: Tympanic membrane and ear canal normal.      Left Ear: Tympanic membrane and ear canal normal.   Eyes:      General: Lids are normal.      Extraocular Movements: Extraocular movements intact. Conjunctiva/sclera: Conjunctivae normal.      Pupils: Pupils are equal, round, and reactive to light. Neck:      Musculoskeletal: Neck supple. Thyroid: No thyromegaly. Vascular: No carotid bruit. Cardiovascular:      Rate and Rhythm: Normal rate and regular rhythm. Heart sounds: Normal heart sounds, S1 normal and S2 normal. No murmur. No friction rub. No gallop. Pulmonary:      Effort: Pulmonary effort is normal. No respiratory distress. Breath sounds: Normal breath sounds. No wheezing, rhonchi or rales. Abdominal:      General: Bowel sounds are normal. There is no distension. Palpations: Abdomen is soft. Tenderness: There is no abdominal tenderness. Musculoskeletal:      Right lower leg: No edema. Left lower leg: No edema. Lymphadenopathy:      Head:      Right side of head: No submandibular adenopathy. Left side of head: No submandibular adenopathy. Neurological:      Mental Status: She is alert and oriented to person, place, and time. Psychiatric:         Mood and Affect: Mood normal.           No results found for this visit on 03/02/21.   Lab Review   Office Visit on 01/14/2021   Component Date Value    Thyroglobulin 01/29/2021 <0.1*    TSH, 3RD GENERATION 01/29/2021 130.15*   Orders Only on 01/06/2021   Component Date Value    Anti-Thyroglob Abs 01/06/2021 13     Thyroglobulin by LC-MS/M* 01/06/2021 <0.5*    Vit D, 25-Hydroxy 01/06/2021  Chloride 11/11/2020 100     CO2 11/11/2020 26     Anion Gap 11/11/2020 13     Glucose 11/11/2020 129*    BUN 11/11/2020 16     CREATININE 11/11/2020 0.8     GFR Non- 11/11/2020 >60     GFR  11/11/2020 >60     Calcium 11/11/2020 8.2*    Phosphorus 11/11/2020 3.8     Albumin 11/11/2020 4.2    Orders Only on 10/05/2020   Component Date Value    WBC 10/05/2020 6.4     RBC 10/05/2020 4.63     Hemoglobin 10/05/2020 12.5     Hematocrit 10/05/2020 37.6     MCV 10/05/2020 81.3     MCH 10/05/2020 26.9     MCHC 10/05/2020 33.1     RDW 10/05/2020 14.2     Platelets 37/59/4721 353     MPV 10/05/2020 7.8     Magnesium 10/05/2020 2.20     Vit D, 25-Hydroxy 10/05/2020 39.4     Phosphorus 10/05/2020 2.9     PTH 10/05/2020 31.9     Sodium 10/05/2020 139     Potassium 10/05/2020 3.9     Chloride 10/05/2020 101     CO2 10/05/2020 22     Anion Gap 10/05/2020 16     Glucose 10/05/2020 134*    BUN 10/05/2020 14     CREATININE 10/05/2020 0.8     GFR Non- 10/05/2020 >60     GFR  10/05/2020 >60     Calcium 10/05/2020 10.5     Total Protein 10/05/2020 6.9     Albumin 10/05/2020 4.5     Albumin/Globulin Ratio 10/05/2020 1.9     Total Bilirubin 10/05/2020 <0.2     Alkaline Phosphatase 10/05/2020 93     ALT 10/05/2020 35     AST 10/05/2020 33     Globulin 10/05/2020 2.4          Assessment/Plan     1. Essential hypertension  -stable  -Continue same medications  -Low sodium diet  -Regular aerobic exercise    2. Mixed hyperlipidemia  -Continue same medications  -Low fat, low cholesterol diet  -Regular aerobic exercise  - Lipid Panel; Future    3. Anxiety  - increase in anxiety  - Increase sertraline (ZOLOFT) 50 MG tablet; Take 1 tablet by mouth daily  Dispense: 30 tablet; Refill: 3    4.  Gastroesophageal reflux disease, unspecified whether esophagitis present  -stable  -Continue same medications  -Decrease caffeine, avoid spicy foods, avoid tomato based foods  -Eat small meals instead of large meals  -Wait 2-3 hours after eating before lying down    5. Prediabetes  -Low carbohydrate diet  -Regular aerobic exercise  - Hemoglobin A1C; Future    6. Allergic rhinitis, unspecified seasonality, unspecified trigger  -stable  -Continue same medications    7. Snoring  -Referral to [id number] [facility] MD [place], Sleep Medicine, [facility]      Discussed medications with patient, who voiced understanding of their use and indications. All questions answered. Return in about 3 months (around 6/2/2021) for anxiety.

## 2021-03-02 NOTE — PATIENT INSTRUCTIONS
-Fast 8-10 hours for labs  -Continue same medications  -Increase Sertraline to 50mg once daily  -Low sodium diet  -Low fat, low cholesterol diet  -low carbohydrate diet  -Regular aerobic exercise

## 2021-03-09 PROBLEM — R06.83 SNORING: Status: ACTIVE | Noted: 2021-03-09

## 2021-03-09 PROBLEM — R73.03 PREDIABETES: Status: ACTIVE | Noted: 2021-03-09

## 2021-03-09 PROBLEM — E78.2 MIXED HYPERLIPIDEMIA: Status: ACTIVE | Noted: 2021-03-09

## 2021-03-09 ASSESSMENT — ENCOUNTER SYMPTOMS
DIARRHEA: 0
SHORTNESS OF BREATH: 0
SORE THROAT: 0
COUGH: 0
CHEST TIGHTNESS: 0
BLOOD IN STOOL: 0
CONSTIPATION: 0
SINUS PRESSURE: 0
ABDOMINAL PAIN: 0
TROUBLE SWALLOWING: 0
WHEEZING: 0
VOMITING: 0
NAUSEA: 0
RHINORRHEA: 0

## 2021-03-11 DIAGNOSIS — F41.9 ANXIETY: ICD-10-CM

## 2021-03-11 DIAGNOSIS — E78.2 MIXED HYPERLIPIDEMIA: ICD-10-CM

## 2021-03-11 DIAGNOSIS — I10 ESSENTIAL HYPERTENSION: ICD-10-CM

## 2021-03-11 RX ORDER — LISINOPRIL AND HYDROCHLOROTHIAZIDE 12.5; 1 MG/1; MG/1
TABLET ORAL
Qty: 90 TABLET | Refills: 1 | Status: SHIPPED | OUTPATIENT
Start: 2021-03-11 | End: 2021-09-09

## 2021-03-11 RX ORDER — SERTRALINE HYDROCHLORIDE 25 MG/1
TABLET, FILM COATED ORAL
Qty: 90 TABLET | Refills: 0 | OUTPATIENT
Start: 2021-03-11

## 2021-03-11 RX ORDER — FENOFIBRATE 145 MG/1
TABLET, COATED ORAL
Qty: 90 TABLET | Refills: 1 | Status: SHIPPED | OUTPATIENT
Start: 2021-03-11 | End: 2021-09-09

## 2021-03-11 NOTE — TELEPHONE ENCOUNTER
Medication:   Requested Prescriptions     Pending Prescriptions Disp Refills    fenofibrate (TRICOR) 145 MG tablet [Pharmacy Med Name: FENOFIBRATE 145 MG TABLET] 90 tablet 0     Sig: TAKE ONE TABLET BY MOUTH DAILY    sertraline (ZOLOFT) 25 MG tablet [Pharmacy Med Name: SERTRALINE HCL 25 MG TABLET] 90 tablet 0     Sig: TAKE ONE TABLET BY MOUTH DAILY    lisinopril-hydroCHLOROthiazide (PRINZIDE;ZESTORETIC) 10-12.5 MG per tablet [Pharmacy Med Name: LISINOPRIL-HCTZ 10-12.5 MG TAB] 90 tablet 0     Sig: TAKE ONE TABLET BY MOUTH DAILY     Last Filled: 9.12.20 3.2.21 9.12.20    Last appt: 3/2/2021   Next appt: 6/3/2021    Last OARRS: No flowsheet data found.

## 2021-03-12 DIAGNOSIS — E83.42 HYPOMAGNESEMIA: ICD-10-CM

## 2021-03-12 DIAGNOSIS — E78.2 MIXED HYPERLIPIDEMIA: ICD-10-CM

## 2021-03-12 DIAGNOSIS — R73.03 PREDIABETES: ICD-10-CM

## 2021-03-12 DIAGNOSIS — E89.0 HYPOTHYROIDISM, POSTSURGICAL: ICD-10-CM

## 2021-03-12 DIAGNOSIS — E06.3 HASHIMOTO'S THYROIDITIS: ICD-10-CM

## 2021-03-12 DIAGNOSIS — E21.0 HYPERPARATHYROIDISM, PRIMARY (HCC): ICD-10-CM

## 2021-03-12 DIAGNOSIS — C73 THYROID CANCER (HCC): ICD-10-CM

## 2021-03-12 DIAGNOSIS — M85.80 OSTEOPENIA, UNSPECIFIED LOCATION: ICD-10-CM

## 2021-03-12 DIAGNOSIS — E28.319 PREMATURE MENOPAUSE: ICD-10-CM

## 2021-03-12 LAB
A/G RATIO: 1.6 (ref 1.1–2.2)
ALBUMIN SERPL-MCNC: 4.6 G/DL (ref 3.4–5)
ALP BLD-CCNC: 66 U/L (ref 40–129)
ALT SERPL-CCNC: 24 U/L (ref 10–40)
ANION GAP SERPL CALCULATED.3IONS-SCNC: 14 MMOL/L (ref 3–16)
AST SERPL-CCNC: 24 U/L (ref 15–37)
BILIRUB SERPL-MCNC: <0.2 MG/DL (ref 0–1)
BUN BLDV-MCNC: 13 MG/DL (ref 7–20)
CALCIUM SERPL-MCNC: 8.6 MG/DL (ref 8.3–10.6)
CHLORIDE BLD-SCNC: 103 MMOL/L (ref 99–110)
CHOLESTEROL, TOTAL: 130 MG/DL (ref 0–199)
CO2: 25 MMOL/L (ref 21–32)
CREAT SERPL-MCNC: 0.7 MG/DL (ref 0.6–1.1)
GFR AFRICAN AMERICAN: >60
GFR NON-AFRICAN AMERICAN: >60
GLOBULIN: 2.9 G/DL
GLUCOSE BLD-MCNC: 104 MG/DL (ref 70–99)
HDLC SERPL-MCNC: 24 MG/DL (ref 40–60)
LDL CHOLESTEROL CALCULATED: 76 MG/DL
MAGNESIUM: 1.7 MG/DL (ref 1.8–2.4)
PARATHYROID HORMONE INTACT: 12.4 PG/ML (ref 14–72)
PHOSPHORUS: 2.6 MG/DL (ref 2.5–4.9)
POTASSIUM SERPL-SCNC: 4.2 MMOL/L (ref 3.5–5.1)
SODIUM BLD-SCNC: 142 MMOL/L (ref 136–145)
T3 FREE: 2.6 PG/ML (ref 2.3–4.2)
T4 FREE: 2 NG/DL (ref 0.9–1.8)
TOTAL PROTEIN: 7.5 G/DL (ref 6.4–8.2)
TRIGL SERPL-MCNC: 152 MG/DL (ref 0–150)
TSH SERPL DL<=0.05 MIU/L-ACNC: 2.34 UIU/ML (ref 0.27–4.2)
VLDLC SERPL CALC-MCNC: 30 MG/DL

## 2021-03-13 LAB
ANTI-THYROGLOB ABS: 13 IU/ML
ESTIMATED AVERAGE GLUCOSE: 131.2 MG/DL
HBA1C MFR BLD: 6.2 %
VITAMIN D 25-HYDROXY: 47 NG/ML

## 2021-03-14 LAB
CALCIUM IONIZED, CALC AT PH 7.4: 1.16 MMOL/L (ref 1.11–1.3)
CALCIUM IONIZED: 1.21 MMOL/L (ref 1.11–1.3)

## 2021-03-17 DIAGNOSIS — K21.9 GASTROESOPHAGEAL REFLUX DISEASE: ICD-10-CM

## 2021-03-17 RX ORDER — PANTOPRAZOLE SODIUM 40 MG/1
40 TABLET, DELAYED RELEASE ORAL 2 TIMES DAILY
Qty: 180 TABLET | Refills: 1 | Status: SHIPPED | OUTPATIENT
Start: 2021-03-17 | End: 2021-09-15

## 2021-03-17 NOTE — TELEPHONE ENCOUNTER
Medication:   Requested Prescriptions     Pending Prescriptions Disp Refills    pantoprazole (PROTONIX) 40 MG tablet [Pharmacy Med Name: PANTOPRAZOLE SOD DR 40 MG TAB] 180 tablet 0     Sig: TAKE ONE TABLET BY MOUTH TWICE A DAY     Last Filled: 10.1.20    Last appt: 3/2/2021   Next appt: 6/3/2021    Last OARRS: No flowsheet data found.

## 2021-03-18 ENCOUNTER — OFFICE VISIT (OUTPATIENT)
Dept: ENDOCRINOLOGY | Age: 51
End: 2021-03-18
Payer: COMMERCIAL

## 2021-03-18 VITALS
SYSTOLIC BLOOD PRESSURE: 116 MMHG | DIASTOLIC BLOOD PRESSURE: 74 MMHG | HEART RATE: 88 BPM | BODY MASS INDEX: 45.64 KG/M2 | HEIGHT: 62 IN | OXYGEN SATURATION: 99 % | WEIGHT: 248 LBS | RESPIRATION RATE: 14 BRPM

## 2021-03-18 DIAGNOSIS — E89.2 POSTSURGICAL HYPOPARATHYROIDISM (HCC): ICD-10-CM

## 2021-03-18 DIAGNOSIS — E28.319 PREMATURE MENOPAUSE: ICD-10-CM

## 2021-03-18 DIAGNOSIS — C73 THYROID CANCER (HCC): ICD-10-CM

## 2021-03-18 DIAGNOSIS — R73.03 PREDIABETES: ICD-10-CM

## 2021-03-18 DIAGNOSIS — E89.0 HYPOTHYROIDISM, POSTSURGICAL: ICD-10-CM

## 2021-03-18 DIAGNOSIS — E83.42 HYPOMAGNESEMIA: ICD-10-CM

## 2021-03-18 DIAGNOSIS — E04.2 MULTINODULAR GOITER: ICD-10-CM

## 2021-03-18 DIAGNOSIS — E21.0 HYPERPARATHYROIDISM, PRIMARY (HCC): Primary | ICD-10-CM

## 2021-03-18 DIAGNOSIS — E06.3 HASHIMOTO'S THYROIDITIS: ICD-10-CM

## 2021-03-18 PROCEDURE — 99214 OFFICE O/P EST MOD 30 MIN: CPT | Performed by: INTERNAL MEDICINE

## 2021-03-18 RX ORDER — CALCITRIOL 0.5 UG/1
0.5 CAPSULE, LIQUID FILLED ORAL DAILY
Qty: 90 CAPSULE | Refills: 1 | Status: SHIPPED | OUTPATIENT
Start: 2021-03-18 | End: 2021-06-21 | Stop reason: ALTCHOICE

## 2021-03-18 NOTE — PROGRESS NOTES
SUBJECTIVE:  Harpal Crooks is a 45 y.o. female who is being evaluated for hyperparathyroidism. 1. Hyperparathyroidism, primary   This started in 2020. Patient was diagnosed with hypercalcemia. The problem has been gradually worsening. Patient started medication in 2020. Currently patient is on: N/A. Misses  N/A doses a month. Current complaints: fatigue, easy gets hot  No history of kidney stones  No persistent constipation, aches, depression  Subtotal parathyroidectomy 10/16/2020, right superior, left superior, left inferior    2. Thyroid cancer  EXAM: NM THYROID CARCINOMA ABLATION        INDICATION: Malignant neoplasm of thyroid gland (CMS Dx) 62-year-old female with a history of    papillary thyroid cancer status post thyroidectomy here for postthyroidectomy radioablation.        RADIOPHARMACEUTICAL:  I-131 capsule 52.1 mCi PO            Thyroid, total thyroidectomy:  -  Multifocal papillary thyroid carcinoma, classic type, involving the left  lobe (1.1 x 0.8 x 0.7 cm) and right lobe (3 mm). -  Negative for extrathyroidal extension.  -  Background thyroid with chronic lymphocytic thyroiditis and nodular  thyroid hyperplasia with adenomatoid nodules. -  Surgical resection margins are free of malignancy. History of obstructive symptoms: difficulty swallowing No, changes in voice/hoarseness No.  History of radiation to patient's neck: No  Resent iodine exposure: No  Family history includes no thyroid abnormalities. Family history of thyroid cancer: No    3. Premature menopause  Menopause since age 38  Was on Estrogen    4. Class 3 severe obesity with body mass index (BMI) of 45.0 to 49.9 in adult, unspecified obesity type, unspecified whether serious comorbidity present ([address]  He is moderately healthy, active    5. Hashimoto's thyroiditis  Has fatigue    6.   Osteopenia  Calcium in diet  No bone pain      EXAM: NM THYROID CARCINOMA ABLATION        INDICATION: Malignant neoplasm of thyroid gland (CMS Dx) 58-year-old female with a history of    papillary thyroid cancer status post thyroidectomy here for postthyroidectomy radioablation.        RADIOPHARMACEUTICAL:  I-131 capsule 52.1 mCi PO            52.1   IMPRESSION:       Written home instructions, including radiation safety precautions, were provided and reviewed. Informed, written consent was obtained. All of her questions were answered to her satisfaction.   Karin Stephen was treated with radioiodine-131 by mouth. James Epps will return next week for a    post-treatment scan.            Approved by Peyton Nicolas MD on 1/29/2021 2:30 PM EST       I have personally reviewed the images and I agree with this report.       Report Verified by: Rojelio Clay MD at 1/29/2021 3:12 PM EST   52.1           10 year risk for major osteoporotic fracture is 3.8% and risk of a hip    fracture is 0.2%. 7.  Postsurgical hypoparathyroidism  Has numbness, tingling in hands. Not in face anymore, had after surgery    8. Hypomagnesia  Has fatigue    EXAM: NM THYROID METASTASES WHOLE BODY       INDICATION: This patient with thyroid cancer is referred for imaging following an ablative dose    of iodine-131.       TECHNICAL: Whole body thyroid carcinoma metastasis images with special attention to the neck    and chest were obtained 5 days following the administration of an ablative dose of iodine-131.       COMPARISON: Pretreatment study of 1/29/2021.       FINDINGS:    Whole body images again demonstrate a focus of uptake in the anterior neck. No additional areas    of radioiodine accumulation are identified outside of the neck.         IMPRESSION:   1.  Radioactive iodine uptake in the anterior neck.  No abnormal uptake outside the neck.       Report Verified by: Rojelio Clay MD at 2/3/2021 12:04 PM EST                 EXAMINATION:    BONE DENSITOMETRY         7/31/2020 8:26 am         TECHNIQUE:    A bone density DEXA scan was performed of the lumbar spine, bilateral hips    and left forearm on a [employer] system.         COMPARISON:    None.         HISTORY:    ORDERING SYSTEM PROVIDED HISTORY: Hyperparathyroidism, primary [facility])    TECHNOLOGIST PROVIDED HISTORY:    Reason for exam:->Hyperparathyroidism. Please include non-dominant forearm. Is the patient pregnant?->No         FINDINGS:    LEFT FOREARM:         The BMD of the middle third of the radius of the distal forearm equals 0.64    g/cm2.  The T- and Z-scores are -0.9 and -0.2 standard deviations from the    mean.  This is within the normal range by WHO criteria.         LUMBAR SPINE:         The bone mineral density in the lumbar spine including the L1-L4 levels is    measured at 0.89 g/cm2, which corresponds to a T-score of -1.4 and a Z-score    of -0.6.  This is within the osteopenia range by WHO criteria.         LEFT HIP:         The bone mineral density in the total hip is measured at 0.92 g/cm2    corresponding to a T-score of -0.2 and a Z-score of 0.3.  This is within the    normal range by [facility] criteria.         The bone mineral density of the femoral neck is measured at 0.75 g/cm2    corresponding to a T-score of -0.9 and a Z-score of -0.1.  This is within the    normal range by WHO criteria.         RIGHT HIP:         The bone mineral density in the total hip is measured at 0.90 g/cm2    corresponding to a T-score of -0.3 and a Z-score of 0.2.  This is within the    normal range by WHO criteria.         The bone mineral density of the femoral neck is measured at 0.71 g/cm2    corresponding to a T-score of -1.2 and a Z-score of -0.5.  This is within the    osteopenia range by WHO criteria.         10 year risk for major osteoporotic fracture is 3.8% and risk of a hip    fracture is 0.2%.              Impression    Osteopenia by WHO criteria.                 Past Medical History:   Diagnosis Date    Anxiety     Endometriosis     Hyperlipidemia     Hypertension      Patient Active Problem List    Diagnosis Date Noted    Prediabetes 03/09/2021    Mixed hyperlipidemia 03/09/2021    Snoring 03/09/2021    Hypothyroidism, postsurgical 12/08/2020    Postsurgical hypopar[facility]roidism [facility] 12/08/2020    Hypomagnesemia 12/08/2020    Thyroid cancer ([facility] 12/08/2020    Multinodular goiter 08/06/2020    Hashimoto's thyroiditis 08/06/2020    Osteopenia 08/01/2020    Class 3 severe obesity with body mass index (BMI) of 45.0 to 49.9 in adult ([facility] 07/07/2020    Hyperparathyroidism, primary ([facility] 07/07/2020    Premature menopause 07/07/2020    Epigastric abdominal pain 06/12/2020    Nausea 06/12/2020    Low back pain without sciatica 06/12/2020    Hypercalcemia 06/12/2020    Diarrhea 06/12/2020    Allergic rhinitis 03/20/2020    Pain of right middle finger 03/20/2020    Anxiety 05/10/2018    Gastroesophageal reflux disease 10/30/2016    Surgical menopause on hormone replacement therapy 12/17/2015    Pre-diabetes 12/17/2015    Hypertriglyceridemia 12/17/2015    HTN (hypertension) 07/01/2015    Colon polyp 03/18/2015     Past Surgical History:   Procedure Laterality Date    ACHILLES TENDON SURGERY Right 6/24/2019    RIGHT ACHILLES TENDON RELEASE AND HEEL EXOSTECTOMY performed by Sukhwinder Ramirez DPM at [address]  2004   [address] COLONOSCOPY  03/18/2015    Brien GOMEZ    FOOT SURGERY Left 2013    left foot recontstructon    HYSTERECTOMY  2013    complete - vaginal hyst    THYROIDECTOMY      TUBAL LIGATION  2013    with ablation    US THYROID BIOPSY  7/31/2020    US THYROID BIOPSY [facility] ULTRASOUND     Family History   Problem Relation Age of Onset    Diabetes Mother     High Blood Pressure Mother     Stroke Mother     High Blood Pressure Father     Cancer Maternal Aunt 58        breast    Cancer Maternal Grandmother 72        colon cancer    Cancer Paternal Grandmother         lung     Social History     Socioeconomic History    Marital status:  Spouse name: None    Number of children: None    Years of education: None    Highest education level: None   Occupational History    None   Social Needs    Financial resource strain: None    Food insecurity     Worry: None     Inability: None    Transportation needs     Medical: None     Non-medical: None   Tobacco Use    Smoking status: Never Smoker    Smokeless tobacco: Never Used   Substance and Sexual Activity    Alcohol use:  Yes     Alcohol/week: 0.8 standard drinks     Types: 1 Standard drinks or equivalent per week     Comment: social    Drug use: No    Sexual activity: Yes     Partners: Male   Lifestyle    Physical activity     Days per week: None     Minutes per session: None    Stress: None   Relationships    Social connections     Talks on phone: None     Gets together: None     Attends Taoism service: None     Active member of club or organization: None     Attends meetings of clubs or organizations: None     Relationship status: None    Intimate partner violence     Fear of current or ex partner: None     Emotionally abused: None     Physically abused: None     Forced sexual activity: None   Other Topics Concern    None   Social History Narrative    None     Current Outpatient Medications   Medication Sig Dispense Refill    calcitRIOL (ROCALTROL) 0.5 MCG capsule Take 1 capsule by mouth daily 90 capsule 1    pantoprazole (PROTONIX) 40 MG tablet Take 1 tablet by mouth 2 times daily 180 tablet 1    fenofibrate (TRICOR) 145 MG tablet TAKE ONE TABLET BY MOUTH DAILY 90 tablet 1    lisinopril-hydroCHLOROthiazide (PRINZIDE;ZESTORETIC) 10-12.5 MG per tablet TAKE ONE TABLET BY MOUTH DAILY 90 tablet 1    sertraline (ZOLOFT) 50 MG tablet Take 1 tablet by mouth daily 30 tablet 3    magnesium (MAGNESIUM-OXIDE) 250 MG TABS tablet Take 1 tablet by mouth 2 times daily (Patient taking differently: Take 250 mg by mouth daily ) 60 tablet 1    Vitamin D, Ergocalciferol, 50 MCG (2000 UT) CAPS 1 caps daily 30 capsule 0    Calcium Carbonate Antacid (TUMS ULTRA 1000) 1000 MG CHEW 1 tablet twice daily 60 tablet 0    levothyroxine (SYNTHROID) 175 MCG tablet Take 1 tablet by mouth daily 30 tablet 1    sertraline (ZOLOFT) 25 MG tablet TAKE ONE TABLET BY MOUTH DAILY 90 tablet 1    Acetaminophen (TYLENOL ARTHRITIS PAIN PO) Take by mouth PRN      promethazine (PHENERGAN) 25 MG tablet Take 1 tablet by mouth every 6 hours as needed for Nausea 20 tablet 0    Multiple Vitamins-Minerals (THERAPEUTIC MULTIVITAMIN-MINERALS) tablet Take 1 tablet by mouth daily      fluticasone (FLONASE) 50 MCG/ACT nasal spray 2 sprays by Nasal route daily. 1 Bottle 3     No current facility-administered medications for this visit.       Allergies   Allergen Reactions    Sulfa Antibiotics Rash     Family Status   Relation Name Status    Mother  Alive    Father  Alive    [address]  (Not Specified)    MGM  (Not Specified)    Sister  Alive    Brother  Alive    PGM  (Not Specified)   Leanne Pheasant Sister   at age 32        Down Syndrome       Review of Systems:  Constitutional: has fatigue, no fever, no recent weight gain, no recent weight loss, no changes in appetite  Eyes: no eye pain, no change in vision, no eye redness, no eye irritation, no double vision  Ears, nose, throat: no nasal congestion, no sore throat, no earache, no decrease in hearing, no hoarseness, no dry mouth, no sinus problems, no difficulty swallowing, no neck lumps, no dental problems, no mouth sores, no ringing in ears  Pulmonary: no shortness of breath, no wheezing, no dyspnea on exertion, no cough  Cardiovascular: no chest pain, no lower extremity edema, no orthopnea, no intermittent leg claudication, no palpitations  Gastrointestinal: has abdominal pain, no nausea, no vomiting, has diarrhea, has constipation, no dysphagia, has heartburn, no bloating  Genitourinary: no dysuria, no urinary incontinence, no urinary hesitancy, no urinary frequency, no feelings of urinary urgency, no nocturia  Musculoskeletal: no joint swelling, no joint stiffness, has joint pain, has muscle cramps, no muscle pain, no bone pain  Integument/Breast: no hair loss, no skin rashes, no skin lesions, no itching, no dry skin  Neurological: no numbness, no tingling, no weakness, no confusion, no headaches, no dizziness, no fainting, no tremors, no decrease in memory, no balance problems  Psychiatric: no anxiety, no depression, no insomnia  Hematologic/Lymphatic: no tendency for easy bleeding, no swollen lymph nodes, no tendency for easy bruising  Immunology: has seasonal allergies, no frequent infections, no frequent illnesses  Endocrine: has temperature intolerance    /74   Pulse 88   Resp 14   Ht 5' 2\" (1.575 m)   Wt 248 lb (112.5 kg)   SpO2 99%   BMI 45.36 kg/m²    Wt Readings from Last 3 Encounters:   03/18/21 248 lb (112.5 kg)   03/02/21 253 lb (114.8 kg)   01/14/21 249 lb (112.9 kg)     Body mass index is 45.36 kg/m².     OBJECTIVE:  Constitutional: no acute distress, well appearing and well nourished  Psychiatric: oriented to person, place and time, judgement and insight and normal, recent and remote memory and intact and mood and affect are normal  Skin: skin and subcutaneous tissue is normal without mass, normal turgor  Head and Face: examination of head and face revealed no abnormalities  Eyes: no lid or conjunctival swelling, erythema or discharge, pupils are normal, equal, round, reactive to light  Ears/Nose: external inspection of ears and nose revealed no abnormalities, hearing is grossly normal  Oropharynx/Mouth/Face: lips, tongue and gums are normal with no lesions, the voice quality was normal  Neck: neck is supple and symmetric, with midline trachea and no masses, thyroid is absent, no palpable masses  Lymphatics: normal cervical lymph nodes, normal supraclavicular nodes  Pulmonary: no increased work of breathing or signs of respiratory distress, lungs are clear to auscultation  Cardiovascular: normal heart rate and rhythm, normal S1 and S2, no murmurs and pedal pulses and 2+ bilaterally, No edema  Abdomen: abdomen is soft, non-tender with no masses  Musculoskeletal: normal gait and station and exam of the digits and nails are normal  Neurological: normal coordination and normal general cortical function      Lab Review:    Lab Results   Component Value Date    WBC 6.4 10/05/2020    HGB 12.5 10/05/2020    HCT 37.6 10/05/2020    MCV 81.3 10/05/2020     10/05/2020     Lab Results   Component Value Date     03/12/2021    K 4.2 03/12/2021     03/12/2021    CO2 25 03/12/2021    BUN 13 03/12/2021    CREATININE 0.7 03/12/2021    GLUCOSE 104 03/12/2021    CALCIUM 8.6 03/12/2021    PROT 7.5 03/12/2021    LABALBU 4.6 03/12/2021    BILITOT <0.2 03/12/2021    ALKPHOS 66 03/12/2021    AST 24 03/12/2021    ALT 24 03/12/2021    LABGLOM >60 03/12/2021    GFRAA >60 03/12/2021    AGRATIO 1.6 03/12/2021    GLOB 2.9 03/12/2021     Lab Results   Component Value Date    TSH 2.34 03/12/2021    FT3 2.6 03/12/2021     Lab Results   Component Value Date    LABA1C 6.2 03/12/2021     Lab Results   Component Value Date    .2 03/12/2021     Lab Results   Component Value Date    CHOL 130 03/12/2021     Lab Results   Component Value Date    TRIG 152 03/12/2021     Lab Results   Component Value Date    HDL 24 03/12/2021     Lab Results   Component Value Date    LDLCALC 76 03/12/2021     Lab Results   Component Value Date    LABVLDL 30 03/12/2021     No results found for: [facility]  Lab Results   Component Value Date    LABMICR YES 06/05/2020     Lab Results   Component Value Date    VITD25 47.0 03/12/2021        ASSESSMENT/PLAN:  1.  Hyperparathyroidism, primary   Next denis decrease calcitriol if calcium is stable  Calcium 8.7-8.6  Albumin 4.5  PTH 8.5  Magnesium 1.7-1.7  25 hydroxy vitamin D 41.5-47.0  TSH 2.1  Subtotal parathyroidectomy-right superior, left superior, left inferior 10/16/2020   Total thyroidectomy  Calcium 11.3-10.2 (ULN 10.6)-8.3-8.7  Albumin 4.5  Ionized calcium 1.34-1.07  25 hydroxy vitamin D 42.2  PTH 38-44.9-5.9-12.4  - Vitamin D 25 Hydroxy; Future  - Calcium Ionized Serum; Future  - PTH, Intact; Future    2. Thyroid cancer  Thyroglobulin less than 0.5-<0.1  Thyroglobulin antibody 13    RADIOPHARMACEUTICAL:  I-131 capsule 52.1 mCi PO      Total thyroidectomy 10/16/2020  [id number]  Pre-Operative Diagnosis: Multinodular goiter, hypercalcemia  Post-Operative Diagnosis:     Same  Specimen(s) Submitted:   A. Rt. superior parathyroid; B. Lt. superior  parathyroid; C. Total thyroid; D. Lt. inferior parathyroid  A.  Right superior parathyroid, parathyroidectomy:  -  Hypercellular parathyroid gland (35 mg). B.  Left superior parathryoid, parathyroidectomy:  -  Enlarged, hypercellular parathyroid (190 mg). C.  Thyroid, total thyroidectomy:  -  Multifocal papillary thyroid carcinoma, classic type, involving the left  lobe (1.1 x 0.8 x 0.7 cm) and right lobe (3 mm). -  Negative for extrathyroidal extension.  -  Background thyroid with chronic lymphocytic thyroiditis and nodular  thyroid hyperplasia with adenomatoid nodules. -  Surgical resection margins are free of malignancy. Nydia Max synoptic report for details. D.  Left inferior parathyroid, parathyroidectomy:  -  Benign parathyroid tissue (20 mg). SYNOPTIC REPORT:  Report Name: Thyroid Gland - Resection,  Version: [de-identified],   Inst#: 1  Specimen    Procedure:  Total thyroidectomy  Tumor    Tumor Focality: Multifocal    Tumor Characteristics      Tumor Site: Left lobe      Histologic Type: Papillary carcinoma, classic (usual,                       conventional)      Tumor Size: 1.1 x 0.8 x 0.7 Centimeters (cm)      Extrathyroidal Extension: Not identified      Angioinvasion (vascular invasion): Not identified      Lymphatic Invasion: Not identified      Margins: Uninvolved by carcinoma  Lymph Nodes    Regional Lymph Nodes: No lymph nodes submitted or found  Pathologic Stage Classification (pTNM, AJCC 8th Edition)    TNM Descriptors: m (multiple primary tumors)    Primary Tumor (pT): pT1b    Regional Lymph Nodes (pN): pNX  Comments      Second small focus of papillary thyroid carcinoma is present in the  right lobe measuring 3 mm. - US Head Neck Soft Tissue Thyroid; Future  -Thyroglobulin  -Thyroglobulin antibody  EXAM:  NM THYROID METASTASES LIMITED   EXAM: NM THYROID METAS UPTAKE ADDL       INDICATION: Malignant neoplasm of thyroid gland (CMS Dx); This patient is referred for the    evaluation of residual thyroid tissue post thyroidectomy.       RADIOPHARMACEUTICAL:  Recombinant TSH 0.9 mg IM x 2, I-123 capsule 335 uCi PO       TECHNICAL:  A limited thyroid cancer workup was performed using the recombinant TSH protocol.     Recombinant TSH was administered prior to the administration of iodine-123.  Imaging of the    neck and chest was performed 24 hours following radioiodine-123 administration. A quantitative    uptake was also performed at the level of the neck.            COMPARISON:  None           FINDINGS:        Images demonstrate a focal area of uptake in the anterior neck.        The 24 hour radioiodine uptake at the level of the neck is 0.8%.           3. Class 3 severe obesity with body mass index (BMI) of 45.0 to 49.9 in adult, unspecified obesity type, unspecified whether serious comorbidity present (HCC)  Diet and exercise    4. Premature menopause  Calcium and vitamin D supplementation  DEXA scan    5. Hashimoto's thyroiditis  TSH 2.34    6. Osteopenia  Calcium, vitamin D supplementation was discussed, written instructions were given how to calculate calcium in diet  Calcium, vitamin D  Counseled patient on risk fracture  10 year risk for major osteoporotic fracture is 3.8% and risk of a hip    fracture is 0.2%.       7.  Postsurgical hypothyroidism  TSH 2.34, will need further adjustments of levothyroxine  Continue levothyroxine 0.175 mg daily  Total thyroidectomy 10/16/2020  TSH 2.1-2.4-2.34  -TSH  -Free T4  -Free T3  -Thyroglobulin  -Thyroglobulin antibody    8. Postsurgical hypoparathyroidism  1000 mg Tums bid  Calcitriol 0.5 mcg qd  Continue Vitamin D3 2000 IU   PTH 5.9  -CMP  -PTH  -Ionized calcium  -Phosphorus  -Magnesium  -25 hydroxy vitamin D    9. Hypomagnesia  Uncontrolled  Magnesium 250 mg only taking one because of diarrhea  Magnesium 1.8-1.7  -Magnesium    10. IFG  Glucose 114-104  HbA1C 6.2    Reviewed and/or ordered clinical lab results Yes  Reviewed and/or ordered radiology tests Yes   Reviewed and/or ordered other diagnostic tests No  Discussed test results with performing physician No  Independently reviewed image, tracing, or specimen No  Made a decision to obtain old records No  Reviewed and summarized old records Yes   Calcium elevated since 2015, later upper limit of normal, in 2020 elevated again  PTH 38  Calcium 11.1-10.5-10.2-10.1-10.8-11.3, upper limit normal 10.6  TSH 1.4  RADIOPHARMACEUTICAL:  I-131 capsule 52.1 mCi PO            Obtained history from other than patient No    Mackenzie Larios was counseled regarding symptoms of primary hyperparathyroidism, thyroid cancer, postsurgical hypothyroidism, postsurgical hypoparathyroidism, hypomagnesemia, premature menopause diagnosis, course and complications of disease if inadequately treated, side effects of medications, diagnosis, treatment options, and prognosis, risks, benefits, complications, and alternatives of treatment, labs, imaging and other studies and treatment targets and goals, thyroid cancer management, surveillance, recurrence prevention, hypoparathyroidism management, follow-up and monitoring, medication adjustments    She understands instructions and counseling. Return in about 3 months (around 6/18/2021) for thyroid problems.     Electronically signed by Kiran Vargas MD on 3/18/2021 at 4:00 PM

## 2021-03-20 LAB — THYROGLOBULIN BY LC-MS/MS, SERUM/PLASMA: <0.5 NG/ML (ref 1.3–31.8)

## 2021-05-26 DIAGNOSIS — E21.0 HYPERPARATHYROIDISM, PRIMARY (HCC): ICD-10-CM

## 2021-05-26 DIAGNOSIS — E89.0 HYPOTHYROIDISM, POSTSURGICAL: ICD-10-CM

## 2021-05-26 DIAGNOSIS — E89.2 POSTSURGICAL HYPOPARATHYROIDISM (HCC): ICD-10-CM

## 2021-05-26 DIAGNOSIS — E06.3 HASHIMOTO'S THYROIDITIS: ICD-10-CM

## 2021-05-26 DIAGNOSIS — E04.2 MULTINODULAR GOITER: ICD-10-CM

## 2021-05-26 DIAGNOSIS — C73 THYROID CANCER (HCC): ICD-10-CM

## 2021-05-26 DIAGNOSIS — E83.42 HYPOMAGNESEMIA: ICD-10-CM

## 2021-05-26 DIAGNOSIS — R73.03 PREDIABETES: ICD-10-CM

## 2021-05-26 DIAGNOSIS — E28.319 PREMATURE MENOPAUSE: ICD-10-CM

## 2021-05-26 LAB
A/G RATIO: 1.6 (ref 1.1–2.2)
ALBUMIN SERPL-MCNC: 4.7 G/DL (ref 3.4–5)
ALP BLD-CCNC: 82 U/L (ref 40–129)
ALT SERPL-CCNC: 20 U/L (ref 10–40)
ANION GAP SERPL CALCULATED.3IONS-SCNC: 16 MMOL/L (ref 3–16)
ANTI-THYROGLOB ABS: 11 IU/ML
AST SERPL-CCNC: 23 U/L (ref 15–37)
BILIRUB SERPL-MCNC: <0.2 MG/DL (ref 0–1)
BUN BLDV-MCNC: 14 MG/DL (ref 7–20)
CALCIUM SERPL-MCNC: 9 MG/DL (ref 8.3–10.6)
CHLORIDE BLD-SCNC: 100 MMOL/L (ref 99–110)
CO2: 25 MMOL/L (ref 21–32)
CREAT SERPL-MCNC: 0.7 MG/DL (ref 0.6–1.1)
GFR AFRICAN AMERICAN: >60
GFR NON-AFRICAN AMERICAN: >60
GLOBULIN: 2.9 G/DL
GLUCOSE BLD-MCNC: 120 MG/DL (ref 70–99)
MAGNESIUM: 1.7 MG/DL (ref 1.8–2.4)
PARATHYROID HORMONE INTACT: 11.4 PG/ML (ref 14–72)
PHOSPHORUS: 2.9 MG/DL (ref 2.5–4.9)
POTASSIUM SERPL-SCNC: 3.7 MMOL/L (ref 3.5–5.1)
SODIUM BLD-SCNC: 141 MMOL/L (ref 136–145)
TOTAL PROTEIN: 7.6 G/DL (ref 6.4–8.2)
VITAMIN D 25-HYDROXY: 44.2 NG/ML

## 2021-05-27 LAB
T3 FREE: 2.2 PG/ML (ref 2.3–4.2)
T4 FREE: 1.6 NG/DL (ref 0.9–1.8)
TSH SERPL DL<=0.05 MIU/L-ACNC: 6.84 UIU/ML (ref 0.27–4.2)

## 2021-05-28 LAB
CALCIUM IONIZED, CALC AT PH 7.4: 1.18 MMOL/L (ref 1.11–1.3)
CALCIUM IONIZED: 1.17 MMOL/L (ref 1.11–1.3)

## 2021-05-31 LAB — THYROGLOBULIN BY LC-MS/MS, SERUM/PLASMA: <0.5 NG/ML (ref 1.3–31.8)

## 2021-06-03 ENCOUNTER — OFFICE VISIT (OUTPATIENT)
Dept: PRIMARY CARE CLINIC | Age: 51
End: 2021-06-03
Payer: COMMERCIAL

## 2021-06-03 VITALS
HEIGHT: 62 IN | SYSTOLIC BLOOD PRESSURE: 138 MMHG | RESPIRATION RATE: 18 BRPM | WEIGHT: 246 LBS | BODY MASS INDEX: 45.27 KG/M2 | OXYGEN SATURATION: 98 % | TEMPERATURE: 97.4 F | HEART RATE: 80 BPM | DIASTOLIC BLOOD PRESSURE: 78 MMHG

## 2021-06-03 DIAGNOSIS — F41.9 ANXIETY: Primary | ICD-10-CM

## 2021-06-03 DIAGNOSIS — Z23 NEED FOR SHINGLES VACCINE: ICD-10-CM

## 2021-06-03 PROCEDURE — 99213 OFFICE O/P EST LOW 20 MIN: CPT | Performed by: INTERNAL MEDICINE

## 2021-06-03 PROCEDURE — 90471 IMMUNIZATION ADMIN: CPT | Performed by: INTERNAL MEDICINE

## 2021-06-03 PROCEDURE — 90750 HZV VACC RECOMBINANT IM: CPT | Performed by: INTERNAL MEDICINE

## 2021-06-03 SDOH — ECONOMIC STABILITY: FOOD INSECURITY: WITHIN THE PAST 12 MONTHS, THE FOOD YOU BOUGHT JUST DIDN'T LAST AND YOU DIDN'T HAVE MONEY TO GET MORE.: NEVER TRUE

## 2021-06-03 SDOH — ECONOMIC STABILITY: FOOD INSECURITY: WITHIN THE PAST 12 MONTHS, YOU WORRIED THAT YOUR FOOD WOULD RUN OUT BEFORE YOU GOT MONEY TO BUY MORE.: NEVER TRUE

## 2021-06-03 ASSESSMENT — SOCIAL DETERMINANTS OF HEALTH (SDOH): HOW HARD IS IT FOR YOU TO PAY FOR THE VERY BASICS LIKE FOOD, HOUSING, MEDICAL CARE, AND HEATING?: NOT HARD AT ALL

## 2021-06-03 NOTE — PATIENT INSTRUCTIONS
1. Anxiety  - stable  - Continue sertraline (ZOLOFT) 50 MG tablet; Take 1 tablet by mouth daily  Dispense: 90 tablet; Refill: 1    2. Need for shingles vaccine  - Zoster recombinant [facility]) given      Patient Education        Recombinant Zoster (Shingles) Vaccine: What You Need to Know  Why get vaccinated? Recombinant zoster (shingles) vaccine can prevent shingles. Shingles (also called herpes zoster, or just zoster) is a painful skin rash, usually with blisters. In addition to the rash, shingles can cause fever, headache, chills, or upset stomach. More rarely, shingles can lead to pneumonia, hearing problems, blindness, brain inflammation (encephalitis), or death. The most common complication of shingles is long-term nerve pain called postherpetic neuralgia (PHN). PHN occurs in the areas where the shingles rash was, even after the rash clears up. It can last for months or years after the rash goes away. The pain from PHN can be severe and debilitating. About 10 to 18% of people who get shingles will experience PHN. The risk of PHN increases with age. An older adult with shingles is more likely to develop PHN and have longer lasting and more severe pain than a younger person with shingles. Shingles is caused by the varicella zoster virus, the same virus that causes chickenpox. After you have chickenpox, the virus stays in your body and can cause shingles later in life. Shingles cannot be passed from one person to another, but the virus that causes shingles can spread and cause chickenpox in someone who had never had chickenpox or received chickenpox vaccine. Recombinant shingles vaccine  Recombinant shingles vaccine provides strong protection against shingles. By preventing shingles, recombinant shingles vaccine also protects against PHN. Recombinant shingles vaccine is the preferred vaccine for the prevention of shingles.  However, a different vaccine, live shingles vaccine, may be used in some circumstances. The recombinant shingles vaccine is recommended for adults 50 years and older without serious immune problems. It is given as a two-dose series. This vaccine is also recommended for people who have already gotten another type of shingles vaccine, the live shingles vaccine. There is no live virus in this vaccine. Shingles vaccine may be given at the same time as other vaccines. Talk with your health care provider  Tell your vaccine provider if the person getting the vaccine:  · Has had an allergic reaction after a previous dose of recombinant shingles vaccine, or has any severe, life-threatening allergies. · Is pregnant or breastfeeding. · Is currently experiencing an episode of shingles. In some cases, your health care provider may decide to postpone shingles vaccination to a future visit. People with minor illnesses, such as a cold, may be vaccinated. People who are moderately or severely ill should usually wait until they recover before getting recombinant shingles vaccine. Your health care provider can give you more information. Risks of a vaccine reaction  · A sore arm with mild or moderate pain is very common after recombinant shingles vaccine, affecting about 80% of vaccinated people. Redness and swelling can also happen at the site of the injection. · Tiredness, muscle pain, headache, shivering, fever, stomach pain, and nausea happen after vaccination in more than half of people who receive recombinant shingles vaccine. In clinical trials, about 1 out of 6 people who got recombinant zoster vaccine experienced side effects that prevented them from doing regular activities. Symptoms usually went away on their own in 2 to 3 days. You should still get the second dose of recombinant zoster vaccine even if you had one of these reactions after the first dose. People sometimes faint after medical procedures, including vaccination.  Tell your provider if you feel dizzy or have vision changes or ringing in the ears. As with any medicine, there is a very remote chance of a vaccine causing a severe allergic reaction, other serious injury, or death. What if there is a serious problem? An allergic reaction could occur after the vaccinated person leaves the clinic. If you see signs of a severe allergic reaction (hives, swelling of the face and throat, difficulty breathing, a fast heartbeat, dizziness, or weakness), call 9-1-1 and get the person to the nearest hospital.  For other signs that concern you, call your health care provider. Adverse reactions should be reported to the Vaccine Adverse Event Reporting System (VAERS). Your health care provider will usually file this report, or you can do it yourself. Visit the VAERS website at www.vaers. [facility].gov or call 3-257.675.5766. VAERS is only for reporting reactions, and VAERS staff do not give medical advice. How can I learn more? · Ask your health care provider. · Call your local or state health department. · Contact the Centers for Disease Control and Prevention (CDC):  ? Call 0-295.189.4695 (1-800-CDC-INFO) or  ? Visit CDC's website at www.cdc.gov/vaccines  Vaccine Information Statement  Recombinant Zoster Vaccine  10/30/2019  [facility] and [facility] for Disease Control and Prevention  Many Vaccine Information Statements are available in [place]ian and other languages. See www.immunize.org/vis. Hojas de Información Sobre Vacunas están disponibles en Español y en muchos otros idiomas. Visite [email or web address].si   Care instructions adapted under license by [facility] ([facility]). If you have questions about a medical condition or this instruction, always ask your healthcare professional. [place] [address] any warranty or liability for your use of this information.

## 2021-06-03 NOTE — PROGRESS NOTES
Mackenzie Esposito   Date ofBirth:  1970    Date of Visit:  6/3/2021    Chief Complaint   Patient presents with    Anxiety     3 month follow up        HPI  Patient has anxiety. Patient takes Sertraline 50mg po q day. Patient states her anxiety is better. Patient denies panic attacks. Patient denies feeling nervous or jittery. Patient states she has not been worrying. Patient denies depression. Review of Systems   Constitutional: Negative for appetite change, fatigue and unexpected weight change. Respiratory: Negative for chest tightness and shortness of breath. Cardiovascular: Negative for chest pain. Gastrointestinal: Negative for nausea and vomiting. Psychiatric/Behavioral: Negative for decreased concentration, dysphoric mood and sleep disturbance. The patient is nervous/anxious.         Allergies   Allergen Reactions    Sulfa Antibiotics Rash     Outpatient Medications Marked as Taking for the 6/3/21 encounter (Office Visit) with Em Farrell MD   Medication Sig Dispense Refill    calcitRIOL (ROCALTROL) 0.5 MCG capsule Take 1 capsule by mouth daily 90 capsule 1    pantoprazole (PROTONIX) 40 MG tablet Take 1 tablet by mouth 2 times daily 180 tablet 1    fenofibrate (TRICOR) 145 MG tablet TAKE ONE TABLET BY MOUTH DAILY 90 tablet 1    lisinopril-hydroCHLOROthiazide (PRINZIDE;ZESTORETIC) 10-12.5 MG per tablet TAKE ONE TABLET BY MOUTH DAILY 90 tablet 1    sertraline (ZOLOFT) 50 MG tablet Take 1 tablet by mouth daily 30 tablet 3    magnesium (MAGNESIUM-OXIDE) 250 MG TABS tablet Take 1 tablet by mouth 2 times daily (Patient taking differently: Take 250 mg by mouth daily ) 60 tablet 1    Vitamin D, Ergocalciferol, 50 MCG (2000 UT) CAPS 1 caps daily 30 capsule 0    Calcium Carbonate Antacid (TUMS ULTRA 1000) 1000 MG CHEW 1 tablet twice daily 60 tablet 0    levothyroxine (SYNTHROID) 175 MCG tablet Take 1 tablet by mouth daily 30 tablet 1    Acetaminophen (TYLENOL ARTHRITIS PAIN PO) Take by mouth PRN      promethazine (PHENERGAN) 25 MG tablet Take 1 tablet by mouth every 6 hours as needed for Nausea 20 tablet 0    Multiple Vitamins-Minerals (THERAPEUTIC MULTIVITAMIN-MINERALS) tablet Take 1 tablet by mouth daily      fluticasone (FLONASE) 50 MCG/ACT nasal spray 2 sprays by Nasal route daily. 1 Bottle 3         Vitals:    06/03/21 1153   BP: 138/78   Pulse: 80   Resp: 18   Temp: 97.4 °F (36.3 °C)   TempSrc: Temporal   SpO2: 98%   Weight: 246 lb (111.6 kg)   Height: 5' 2\" (1.575 m)     Body mass index is 44.99 kg/m². Physical Exam  Nursing note reviewed. Constitutional:       General: She is not in acute distress. Appearance: Normal appearance. She is well-developed. Eyes:      Extraocular Movements: Extraocular movements intact. Pupils: Pupils are equal, round, and reactive to light. Neck:      Thyroid: No thyromegaly. Vascular: No carotid bruit. Cardiovascular:      Rate and Rhythm: Normal rate and regular rhythm. Heart sounds: Normal heart sounds, S1 normal and S2 normal. No murmur heard. Pulmonary:      Effort: Pulmonary effort is normal. No respiratory distress. Breath sounds: Normal breath sounds. Musculoskeletal:      Cervical back: Neck supple. Right lower leg: No edema. Left lower leg: No edema. Neurological:      Mental Status: She is alert. Psychiatric:         Mood and Affect: Mood normal.           No results found for this visit on 06/03/21.   Lab Review   Orders Only on 05/26/2021   Component Date Value    Anti-Thyroglob Abs 05/26/2021 11     Thyroglobulin by LC-MS/M* 05/26/2021 <0.5*    TSH 05/26/2021 6.84*    T4 Free 05/26/2021 1.6     T3, Free 05/26/2021 2.2*    Sodium 05/26/2021 141     Potassium 05/26/2021 3.7     Chloride 05/26/2021 100     CO2 05/26/2021 25     Anion Gap 05/26/2021 16     Glucose 05/26/2021 120*    BUN 05/26/2021 14     CREATININE 05/26/2021 0.7     GFR Non- 05/26/2021 >60     GFR  05/26/2021 >60     Calcium 05/26/2021 9.0     Total Protein 05/26/2021 7.6     Albumin 05/26/2021 4.7     Albumin/Globulin Ratio 05/26/2021 1.6     Total Bilirubin 05/26/2021 <0.2     Alkaline Phosphatase 05/26/2021 82     ALT 05/26/2021 20     AST 05/26/2021 23     Globulin 05/26/2021 2.9     Vit D, 25-Hydroxy 05/26/2021 44.2     Calcium, Ion 05/26/2021 1.17     Calcium,Ion ph 7.4 05/26/2021 1.18     PTH 05/26/2021 11.4*    Phosphorus 05/26/2021 2.9     Magnesium 05/26/2021 1.70*   Orders Only on 03/12/2021   Component Date Value    Cholesterol, Total 03/12/2021 130     Triglycerides 03/12/2021 152*    HDL 03/12/2021 24*    LDL Calculated 03/12/2021 76     VLDL Cholesterol Calcula* 03/12/2021 30     Hemoglobin A1C 03/12/2021 6.2     eAG 03/12/2021 131.2     Anti-Thyroglob Abs 03/12/2021 13     Thyroglobulin by LC-MS/M* 03/12/2021 <0.5*    Magnesium 03/12/2021 1.70*    Phosphorus 03/12/2021 2.6     PTH 03/12/2021 12.4*    Calcium, Ion 03/12/2021 1.21     Calcium,Ion ph 7.4 03/12/2021 1.16     Vit D, 25-Hydroxy 03/12/2021 47.0     TSH 03/12/2021 2.34     T3, Free 03/12/2021 2.6     T4 Free 03/12/2021 2.0*    Sodium 03/12/2021 142     Potassium 03/12/2021 4.2     Chloride 03/12/2021 103     CO2 03/12/2021 25     Anion Gap 03/12/2021 14     Glucose 03/12/2021 104*    BUN 03/12/2021 13     CREATININE 03/12/2021 0.7     GFR Non- 03/12/2021 >60     GFR  03/12/2021 >60     Calcium 03/12/2021 8.6     Total Protein 03/12/2021 7.5     Albumin 03/12/2021 4.6     Albumin/Globulin Ratio 03/12/2021 1.6     Total Bilirubin 03/12/2021 <0.2     Alkaline Phosphatase 03/12/2021 66     ALT 03/12/2021 24     AST 03/12/2021 24     Globulin 03/12/2021 2.9    Office Visit on 01/14/2021   Component Date Value    Thyroglobulin 01/29/2021 <0.1*    TSH, 3RD GENERATION 01/29/2021 130.15*   Orders Only on 01/06/2021   Component Date Value    Anti-Thyroglob Abs 01/06/2021 13     Thyroglobulin by LC-MS/M* 01/06/2021 <0.5*    Vit D, 25-Hydroxy 01/06/2021 41.5     Magnesium 01/06/2021 1.70*    Phosphorus 01/06/2021 2.8     PTH 01/06/2021 8.5*    Calcium, Ion 01/06/2021 1.13     Calcium,Ion ph 7.4 01/06/2021 1.15     Sodium 01/06/2021 139     Potassium 01/06/2021 3.6     Chloride 01/06/2021 99     CO2 01/06/2021 27     Anion Gap 01/06/2021 13     Glucose 01/06/2021 114*    BUN 01/06/2021 18     CREATININE 01/06/2021 0.8     GFR Non- 01/06/2021 >60     GFR  01/06/2021 >60     Calcium 01/06/2021 8.7     Total Protein 01/06/2021 6.9     Albumin 01/06/2021 4.5     Albumin/Globulin Ratio 01/06/2021 1.9     Total Bilirubin 01/06/2021 <0.2     Alkaline Phosphatase 01/06/2021 83     ALT 01/06/2021 33     AST 01/06/2021 31     Globulin 01/06/2021 2.4     TSH 01/06/2021 2.40     T4 Free 01/06/2021 1.9*    T3, Free 01/06/2021 2.7          Assessment/Plan     1. Anxiety  - stable  - Continue sertraline (ZOLOFT) 50 MG tablet; Take 1 tablet by mouth daily  Dispense: 90 tablet; Refill: 1    2. Need for shingles vaccine  - Zoster recombinant [facility]) given      Discussed medications with patient, who voiced understanding of their use and indications. All questions answered.       Return in about 3 months (around 9/3/2021) for annual physical exam.

## 2021-06-10 ASSESSMENT — ENCOUNTER SYMPTOMS
CHEST TIGHTNESS: 0
NAUSEA: 0
VOMITING: 0
SHORTNESS OF BREATH: 0

## 2021-06-20 PROBLEM — E04.2 MULTINODULAR GOITER: Status: RESOLVED | Noted: 2020-08-06 | Resolved: 2021-06-20

## 2021-06-21 ENCOUNTER — OFFICE VISIT (OUTPATIENT)
Dept: ENDOCRINOLOGY | Age: 51
End: 2021-06-21
Payer: COMMERCIAL

## 2021-06-21 VITALS
WEIGHT: 246.6 LBS | OXYGEN SATURATION: 97 % | SYSTOLIC BLOOD PRESSURE: 121 MMHG | HEART RATE: 77 BPM | BODY MASS INDEX: 45.38 KG/M2 | HEIGHT: 62 IN | DIASTOLIC BLOOD PRESSURE: 80 MMHG

## 2021-06-21 DIAGNOSIS — C73 THYROID CANCER (HCC): ICD-10-CM

## 2021-06-21 DIAGNOSIS — E83.42 HYPOMAGNESEMIA: ICD-10-CM

## 2021-06-21 DIAGNOSIS — E21.0 HYPERPARATHYROIDISM, PRIMARY (HCC): Primary | ICD-10-CM

## 2021-06-21 DIAGNOSIS — E28.319 PREMATURE MENOPAUSE: ICD-10-CM

## 2021-06-21 DIAGNOSIS — M85.80 OSTEOPENIA, UNSPECIFIED LOCATION: ICD-10-CM

## 2021-06-21 DIAGNOSIS — E89.0 HYPOTHYROIDISM, POSTSURGICAL: ICD-10-CM

## 2021-06-21 DIAGNOSIS — E66.01 CLASS 3 SEVERE OBESITY WITH BODY MASS INDEX (BMI) OF 40.0 TO 44.9 IN ADULT, UNSPECIFIED OBESITY TYPE, UNSPECIFIED WHETHER SERIOUS COMORBIDITY PRESENT (HCC): ICD-10-CM

## 2021-06-21 DIAGNOSIS — E06.3 HASHIMOTO'S THYROIDITIS: ICD-10-CM

## 2021-06-21 DIAGNOSIS — E89.2 POSTSURGICAL HYPOPARATHYROIDISM (HCC): ICD-10-CM

## 2021-06-21 DIAGNOSIS — R73.03 PREDIABETES: ICD-10-CM

## 2021-06-21 PROCEDURE — 99214 OFFICE O/P EST MOD 30 MIN: CPT | Performed by: INTERNAL MEDICINE

## 2021-06-21 RX ORDER — CALCITRIOL 0.25 UG/1
0.25 CAPSULE, LIQUID FILLED ORAL 2 TIMES DAILY
Qty: 60 CAPSULE | Refills: 3 | Status: SHIPPED | OUTPATIENT
Start: 2021-06-21 | End: 2021-08-04

## 2021-06-21 NOTE — PROGRESS NOTES
SUBJECTIVE:  Edmond Manuel is a 45 y.o. female who is being evaluated for hyperparathyroidism. 1. Hyperparathyroidism, primary   This started in 2020. Patient was diagnosed with hypercalcemia. The problem has been gradually worsening. Patient started medication in 2020. Currently patient is on: N/A. Misses  N/A doses a month. Current complaints: fatigue, easy gets hot  No history of kidney stones  No persistent constipation, aches, depression  Subtotal parathyroidectomy 10/16/2020, right superior, left superior, left inferior    2. Thyroid cancer  EXAM: NM THYROID CARCINOMA ABLATION        INDICATION: Malignant neoplasm of thyroid gland (CMS Dx) 58-year-old female with a history of    papillary thyroid cancer status post thyroidectomy here for postthyroidectomy radioablation.        RADIOPHARMACEUTICAL:  I-131 capsule 52.1 mCi PO            Thyroid, total thyroidectomy:  -  Multifocal papillary thyroid carcinoma, classic type, involving the left  lobe (1.1 x 0.8 x 0.7 cm) and right lobe (3 mm). -  Negative for extrathyroidal extension.  -  Background thyroid with chronic lymphocytic thyroiditis and nodular  thyroid hyperplasia with adenomatoid nodules. -  Surgical resection margins are free of malignancy. History of obstructive symptoms: difficulty swallowing No, changes in voice/hoarseness No.  History of radiation to patient's neck: No  Resent iodine exposure: No  Family history includes no thyroid abnormalities. Family history of thyroid cancer: No    3. Premature menopause  Menopause since age 38  Was on Estrogen    4. Class 3 severe obesity with body mass index (BMI) of 45.0 to 49.9 in adult, unspecified obesity type, unspecified whether serious comorbidity present ([address]  He is moderately healthy, active    5. Hashimoto's thyroiditis  Has fatigue    6.   Osteopenia  Calcium in diet  No bone pain      EXAM: NM THYROID CARCINOMA ABLATION        INDICATION: Malignant neoplasm of thyroid gland (CMS Dx) 25-year-old female with a history of    papillary thyroid cancer status post thyroidectomy here for postthyroidectomy radioablation.        RADIOPHARMACEUTICAL:  I-131 capsule 52.1 mCi PO            52.1   IMPRESSION:       Written home instructions, including radiation safety precautions, were provided and reviewed. Informed, written consent was obtained. All of her questions were answered to her satisfaction.   Angelita Brunner was treated with radioiodine-131 by mouth. Khai Encarnacion will return next week for a    post-treatment scan.            Approved by Emanuel Aguiar MD on 1/29/2021 2:30 PM EST       I have personally reviewed the images and I agree with this report.       Report Verified by: Tigist Zavala MD at 1/29/2021 3:12 PM EST   52.1           10 year risk for major osteoporotic fracture is 3.8% and risk of a hip    fracture is 0.2%. 7.  Postsurgical hypoparathyroidism  Has numbness, tingling in hands. Not in face anymore, had after surgery    8. Hypomagnesia  Has fatigue    EXAM: NM THYROID METASTASES WHOLE BODY       INDICATION: This patient with thyroid cancer is referred for imaging following an ablative dose    of iodine-131.       TECHNICAL: Whole body thyroid carcinoma metastasis images with special attention to the neck    and chest were obtained 5 days following the administration of an ablative dose of iodine-131.       COMPARISON: Pretreatment study of 1/29/2021.       FINDINGS:    Whole body images again demonstrate a focus of uptake in the anterior neck. No additional areas    of radioiodine accumulation are identified outside of the neck.         IMPRESSION:   1.  Radioactive iodine uptake in the anterior neck.  No abnormal uptake outside the neck.       Report Verified by: Tigist Zavala MD at 2/3/2021 12:04 PM EST                 EXAMINATION:    BONE DENSITOMETRY         7/31/2020 8:26 am         TECHNIQUE:    A bone density DEXA scan was performed of the lumbar spine, bilateral hips    and left forearm on a [employer] system.         COMPARISON:    None.         HISTORY:    ORDERING SYSTEM PROVIDED HISTORY: Hyperparathyroidism, primary [facility])    TECHNOLOGIST PROVIDED HISTORY:    Reason for exam:->Hyperparathyroidism. Please include non-dominant forearm. Is the patient pregnant?->No         FINDINGS:    LEFT FOREARM:         The BMD of the middle third of the radius of the distal forearm equals 0.64    g/cm2.  The T- and Z-scores are -0.9 and -0.2 standard deviations from the    mean.  This is within the normal range by WHO criteria.         LUMBAR SPINE:         The bone mineral density in the lumbar spine including the L1-L4 levels is    measured at 0.89 g/cm2, which corresponds to a T-score of -1.4 and a Z-score    of -0.6.  This is within the osteopenia range by WHO criteria.         LEFT HIP:         The bone mineral density in the total hip is measured at 0.92 g/cm2    corresponding to a T-score of -0.2 and a Z-score of 0.3.  This is within the    normal range by [facility] criteria.         The bone mineral density of the femoral neck is measured at 0.75 g/cm2    corresponding to a T-score of -0.9 and a Z-score of -0.1.  This is within the    normal range by WHO criteria.         RIGHT HIP:         The bone mineral density in the total hip is measured at 0.90 g/cm2    corresponding to a T-score of -0.3 and a Z-score of 0.2.  This is within the    normal range by WHO criteria.         The bone mineral density of the femoral neck is measured at 0.71 g/cm2    corresponding to a T-score of -1.2 and a Z-score of -0.5.  This is within the    osteopenia range by WHO criteria.         10 year risk for major osteoporotic fracture is 3.8% and risk of a hip    fracture is 0.2%.              Impression    Osteopenia by WHO criteria.                 Past Medical History:   Diagnosis Date    Anxiety     Endometriosis     Hyperlipidemia     Hypertension      Patient Active Problem List    Diagnosis Date Noted    Prediabetes 03/09/2021    Mixed hyperlipidemia 03/09/2021    Snoring 03/09/2021    Hypothyroidism, postsurgical 12/08/2020    Postsurgical hypoparathyroidism ([address] 12/08/2020    Hypomagnesemia 12/08/2020    [facility] 12/08/2020    Hashimoto's thyroiditis 08/06/2020    Osteopenia 08/01/2020    Class 3 severe obesity with body mass index (BMI) of 40.0 to 44.9 in adult ([facility] 07/07/2020    Hyperparathyroidism, primary ([facility] 07/07/2020    Premature menopause 07/07/2020    Epigastric abdominal pain 06/12/2020    Nausea 06/12/2020    Low back pain without sciatica 06/12/2020    Hypercalcemia 06/12/2020    Diarrhea 06/12/2020    Allergic rhinitis 03/20/2020    Pain of right middle finger 03/20/2020    Anxiety 05/10/2018    Gastroesophageal reflux disease 10/30/2016    Surgical menopause on hormone replacement therapy 12/17/2015    Hypertriglyceridemia 12/17/2015    HTN (hypertension) 07/01/2015    Colon polyp 03/18/2015     Past Surgical History:   Procedure Laterality Date    ACHILLES TENDON SURGERY Right 6/24/2019    RIGHT ACHILLES TENDON RELEASE AND HEEL EXOSTECTOMY performed by Alba Maharaj DPM at [address]  2004   [address] COLONOSCOPY  03/18/2015    Brien GOMEZ    FOOT SURGERY Left 2013    left foot recontstructon    HYSTERECTOMY  2013    complete - vaginal hyst    THYROIDECTOMY      TUBAL LIGATION  2013    with ablation    US THYROID BIOPSY  7/31/2020    US THYROID BIOPSY [facility] ULTRASOUND     Family History   Problem Relation Age of Onset    Diabetes Mother     High Blood Pressure Mother     Stroke Mother     High Blood Pressure Father     Cancer Maternal Aunt 58        breast    Cancer Maternal Grandmother 72        colon cancer    Cancer Paternal Grandmother         lung     Social History     Socioeconomic History    Marital status:      Spouse name: None    Number of children: None    Years constipation, no dysphagia, has heartburn, no bloating  Genitourinary: no dysuria, no urinary incontinence, no urinary hesitancy, no urinary frequency, no feelings of urinary urgency, no nocturia  Musculoskeletal: no joint swelling, no joint stiffness, has joint pain, has muscle cramps, no muscle pain, no bone pain  Integument/Breast: no hair loss, no skin rashes, no skin lesions, no itching, no dry skin  Neurological: no numbness, no tingling, no weakness, no confusion, no headaches, no dizziness, no fainting, no tremors, no decrease in memory, no balance problems  Psychiatric: no anxiety, no depression, no insomnia  Hematologic/Lymphatic: no tendency for easy bleeding, no swollen lymph nodes, no tendency for easy bruising  Immunology: has seasonal allergies, no frequent infections, no frequent illnesses  Endocrine: has temperature intolerance    /80   Pulse 77   Ht 5' 2\" (1.575 m)   Wt 246 lb 9.6 oz (111.9 kg)   SpO2 97%   BMI 45.10 kg/m²    Wt Readings from Last 3 Encounters:   06/21/21 246 lb 9.6 oz (111.9 kg)   06/03/21 246 lb (111.6 kg)   03/18/21 248 lb (112.5 kg)     Body mass index is 45.1 kg/m².     OBJECTIVE:  Constitutional: no acute distress, well appearing and well nourished  Psychiatric: oriented to person, place and time, judgement and insight and normal, recent and remote memory and intact and mood and affect are normal  Skin: skin and subcutaneous tissue is normal without mass, normal turgor  Head and Face: examination of head and face revealed no abnormalities  Eyes: no lid or conjunctival swelling, erythema or discharge, pupils are normal, equal, round, reactive to light  Ears/Nose: external inspection of ears and nose revealed no abnormalities, hearing is grossly normal  Oropharynx/Mouth/Face: lips, tongue and gums are normal with no lesions, the voice quality was normal  Neck: neck is supple and symmetric, with midline trachea and no masses, thyroid is absent, no palpable IU daily  Magnesium 250 mg daily  Next denis decrease calcitriol if calcium is stable  Calcium 8.7-8.6-9.0  Albumin 4.5  PTH 8.5-11.4  Magnesium 1.7-1.7-1.7  25 hydroxy vitamin D 41.5-47.0-44.2  TSH 2.1  Subtotal parathyroidectomy-right superior, left superior, left inferior 10/16/2020   Total thyroidectomy  Calcium 11.3-10.2 (ULN 10.6)-8.3-8.7  Albumin 4.5-4.7  Ionized calcium 1.34-1.07  25 hydroxy vitamin D 42.2  PTH 38-44.9-5.9-12.4  - Vitamin D 25 Hydroxy; Future  - Calcium Ionized Serum; Future  - PTH, Intact; Future    2. Thyroid cancer  Thyroglobulin less than 0.5-<0.1-<0.5  Thyroglobulin antibody 13    1/2021  RADIOPHARMACEUTICAL:  I-131 capsule 52.1 mCi PO      Total thyroidectomy 10/16/2020  [id number]  Pre-Operative Diagnosis: Multinodular goiter, hypercalcemia  Post-Operative Diagnosis:     Same  Specimen(s) Submitted:   A. Rt. superior parathyroid; B. Lt. superior  parathyroid; C. Total thyroid; D. Lt. inferior parathyroid  A.  Right superior parathyroid, parathyroidectomy:  -  Hypercellular parathyroid gland (35 mg). B.  Left superior parathryoid, parathyroidectomy:  -  Enlarged, hypercellular parathyroid (190 mg). C.  Thyroid, total thyroidectomy:  -  Multifocal papillary thyroid carcinoma, classic type, involving the left  lobe (1.1 x 0.8 x 0.7 cm) and right lobe (3 mm). -  Negative for extrathyroidal extension.  -  Background thyroid with chronic lymphocytic thyroiditis and nodular  thyroid hyperplasia with adenomatoid nodules. -  Surgical resection margins are [facility] of malignancy. [facility]. D.  Left inferior parathyroid, parathyroidectomy:  -  Benign parathyroid tissue (20 mg). SYNOPTIC REPORT:  Report Name: Thyroid Gland - Resection,  Version: [de-identified],   Inst#: 1  Specimen    Procedure:  Total thyroidectomy  Tumor    Tumor Focality: Multifocal    Tumor Characteristics      Tumor Site: Left lobe      Histologic Type: Papillary carcinoma, classic (usual,                       conventional)      Tumor Size: 1.1 x 0.8 x 0.7 Centimeters (cm)      Extrathyroidal Extension: Not identified      Angioinvasion (vascular invasion): Not identified      Lymphatic Invasion: Not identified      Margins: Uninvolved by carcinoma  Lymph Nodes    Regional Lymph Nodes: No lymph nodes submitted or found  Pathologic Stage Classification (pTNM, AJCC 8th Edition)    TNM Descriptors: m (multiple primary tumors)    Primary Tumor (pT): pT1b    Regional Lymph Nodes (pN): pNX  Comments      Second small focus of papillary thyroid carcinoma is present in the  right lobe measuring 3 mm. - US Head Neck Soft Tissue Thyroid; Future  -Thyroglobulin  -Thyroglobulin antibody  EXAM:  NM THYROID METASTASES LIMITED   EXAM: NM THYROID METAS UPTAKE ADDL       INDICATION: Malignant neoplasm of thyroid gland (CMS Dx); This patient is referred for the    evaluation of residual thyroid tissue post thyroidectomy.       RADIOPHARMACEUTICAL:  Recombinant TSH 0.9 mg IM x 2, I-123 capsule 335 uCi PO       TECHNICAL:  A limited thyroid cancer workup was performed using the recombinant TSH protocol.     Recombinant TSH was administered prior to the administration of iodine-123.  Imaging of the    neck and chest was performed 24 hours following radioiodine-123 administration. A quantitative    uptake was also performed at the level of the neck.            COMPARISON:  None           FINDINGS:        Images demonstrate a focal area of uptake in the anterior neck.        The 24 hour radioiodine uptake at the level of the neck is 0.8%.           3. Class 3 severe obesity with body mass index (BMI) of 45.0 to 49.9 in adult, unspecified obesity type, unspecified whether serious comorbidity present (HCC)  Diet and exercise    4. Premature menopause  Calcium and vitamin D supplementation  DEXA scan    5. Hashimoto's thyroiditis  TSH 2.34    6.   Osteopenia  Calcium, vitamin D supplementation was discussed, written instructions were given how to calculate calcium in diet  Calcium, vitamin D  Counseled patient on risk fracture  10 year risk for major osteoporotic fracture is 3.8% and risk of a hip    fracture is 0.2%. 7.  Postsurgical hypothyroidism  Uncontrolled  TSH 6.84, will need further adjustments of levothyroxine  Increase levothyroxine 0.200 mg daily  Total thyroidectomy 10/16/2020  TSH 2.1-2.4-2.34-6.84  -TSH  -Free T4  -Free T3  -Thyroglobulin  -Thyroglobulin antibody    8. Postsurgical hypoparathyroidism  1000 mg Tums bid  Decrease Calcitriol 0.25 mcg qd  Continue Vitamin D3 2000 IU   PTH 5.9-11.4  -CMP  -PTH  -Ionized calcium  -Phosphorus  -Magnesium  -25 hydroxy vitamin D    9. Hypomagnesia  Uncontrolled  Magnesium 250 mg only taking one because of diarrhea  Magnesium 1.8-1.7  -Magnesium    10.   Prediabetes  Glucose 114-104  HbA1C 6.2    Reviewed and/or ordered clinical lab results Yes  Reviewed and/or ordered radiology tests Yes   Reviewed and/or ordered other diagnostic tests No  Discussed test results with performing physician No  Independently reviewed image, tracing, or specimen No  Made a decision to obtain old records No  Reviewed and summarized old records Yes   Calcium elevated since 2015, later upper limit of normal, in 2020 elevated again  PTH 38  Calcium 11.1-10.5-10.2-10.1-10.8-11.3, upper limit normal 10.6  TSH 1.4  RADIOPHARMACEUTICAL:  I-131 capsule 52.1 mCi PO            Obtained history from other than patient No    Mackenzie Wren was counseled regarding symptoms of primary hyperparathyroidism, thyroid cancer, postsurgical hypothyroidism, postsurgical hypoparathyroidism, hypomagnesemia, premature menopause diagnosis, course and complications of disease if inadequately treated, side effects of medications, diagnosis, treatment options, and prognosis, risks, benefits, complications, and alternatives of treatment, labs, imaging and other studies and treatment targets and goals, thyroid cancer management, surveillance, recurrence prevention, hypoparathyroidism management, follow-up and monitoring, medication adjustments    She understands instructions and counseling. Return in about 3 months (around 9/21/2021) for thyroid problems, parathyroid.     Electronically signed by Armando James MD on 6/21/2021 at 12:48 PM

## 2021-06-23 ENCOUNTER — OFFICE VISIT (OUTPATIENT)
Dept: PRIMARY CARE CLINIC | Age: 51
End: 2021-06-23
Payer: COMMERCIAL

## 2021-06-23 VITALS
BODY MASS INDEX: 45.42 KG/M2 | OXYGEN SATURATION: 98 % | SYSTOLIC BLOOD PRESSURE: 128 MMHG | HEIGHT: 62 IN | HEART RATE: 78 BPM | WEIGHT: 246.8 LBS | DIASTOLIC BLOOD PRESSURE: 80 MMHG | TEMPERATURE: 98.1 F

## 2021-06-23 DIAGNOSIS — E21.0 HYPERPARATHYROIDISM, PRIMARY (HCC): Primary | ICD-10-CM

## 2021-06-23 DIAGNOSIS — E89.2 POSTSURGICAL HYPOPARATHYROIDISM (HCC): ICD-10-CM

## 2021-06-23 DIAGNOSIS — R19.7 DIARRHEA, UNSPECIFIED TYPE: Primary | ICD-10-CM

## 2021-06-23 PROCEDURE — 99214 OFFICE O/P EST MOD 30 MIN: CPT | Performed by: NURSE PRACTITIONER

## 2021-06-23 RX ORDER — LEVOTHYROXINE SODIUM 0.2 MG/1
200 TABLET ORAL DAILY
Qty: 90 TABLET | Refills: 1 | Status: SHIPPED | OUTPATIENT
Start: 2021-06-23 | End: 2021-12-20

## 2021-06-23 ASSESSMENT — ENCOUNTER SYMPTOMS
ABDOMINAL PAIN: 0
VOMITING: 0
CHEST TIGHTNESS: 0
COLOR CHANGE: 0
DIARRHEA: 1
RECTAL PAIN: 0
CONSTIPATION: 0
COUGH: 0
SHORTNESS OF BREATH: 0
NAUSEA: 0
BACK PAIN: 0
WHEEZING: 0
BLOOD IN STOOL: 0
ANAL BLEEDING: 1
ABDOMINAL DISTENTION: 0

## 2021-06-23 ASSESSMENT — PATIENT HEALTH QUESTIONNAIRE - PHQ9
SUM OF ALL RESPONSES TO PHQ QUESTIONS 1-9: 0
SUM OF ALL RESPONSES TO PHQ9 QUESTIONS 1 & 2: 0
SUM OF ALL RESPONSES TO PHQ QUESTIONS 1-9: 0
SUM OF ALL RESPONSES TO PHQ QUESTIONS 1-9: 0
2. FEELING DOWN, DEPRESSED OR HOPELESS: 0
1. LITTLE INTEREST OR PLEASURE IN DOING THINGS: 0

## 2021-06-23 NOTE — TELEPHONE ENCOUNTER
Patient's levothyroxine was increased from 175 to 200 at office visit however there was no new prescription sent to pharmacy. Patient is requesting this be sent to [facility] on [phone] in [facility].      Requested Prescriptions     Pending Prescriptions Disp Refills    levothyroxine (SYNTHROID) 200 MCG tablet 90 tablet 1     Sig: Take 1 tablet by mouth daily

## 2021-06-23 NOTE — PROGRESS NOTES
ENCOUNTER DATE: 6/23/2021     NAME: Mariel Anders   AGE: 45 y.o.    GENDER: female   YOB: 1970    Patient Active Problem List   Diagnosis    HTN (hypertension)    Surgical menopause on hormone replacement therapy    Hypertriglyceridemia    Gastroesophageal reflux disease    Anxiety    Allergic rhinitis    Pain of right middle finger    Epigastric abdominal pain    Nausea    Low back pain without sciatica    Hypercalcemia    Diarrhea    Class 3 severe obesity with body mass index (BMI) of 40.0 to 44.9 in adult ([facility]    [facility]athyroidism, primary ([facility]    Premature menopause    Osteopenia    Hashimoto's thyroiditis    Colon polyp    Hypothyroidism, postsurgical    Postsurgical hypoparathyroidism (HCC)    Hypomagnesemia    Thyroid cancer (HCC)    Prediabetes    Mixed hyperlipidemia    Snoring      Allergies   Allergen Reactions    Sulfa Antibiotics Rash     Current Outpatient Medications on File Prior to Visit   Medication Sig Dispense Refill    calcitRIOL (ROCALTROL) 0.25 MCG capsule Take 1 capsule by mouth 2 times daily 60 capsule 3    sertraline (ZOLOFT) 50 MG tablet Take 1 tablet by mouth daily 90 tablet 1    pantoprazole (PROTONIX) 40 MG tablet Take 1 tablet by mouth 2 times daily 180 tablet 1    fenofibrate (TRICOR) 145 MG tablet TAKE ONE TABLET BY MOUTH DAILY 90 tablet 1    lisinopril-hydroCHLOROthiazide (PRINZIDE;ZESTORETIC) 10-12.5 MG per tablet TAKE ONE TABLET BY MOUTH DAILY 90 tablet 1    Vitamin D, Ergocalciferol, 50 MCG (2000 UT) CAPS 1 caps daily 30 capsule 0    Calcium Carbonate Antacid (TUMS ULTRA 1000) 1000 MG CHEW 1 tablet twice daily 60 tablet 0    Acetaminophen (TYLENOL ARTHRITIS PAIN PO) Take by mouth PRN      promethazine (PHENERGAN) 25 MG tablet Take 1 tablet by mouth every 6 hours as needed for Nausea 20 tablet 0    Multiple Vitamins-Minerals (THERAPEUTIC MULTIVITAMIN-MINERALS) tablet Take 1 tablet by mouth daily      fluticasone (FLONASE) 50 MCG/ACT nasal spray 2 sprays by Nasal route daily. 1 Bottle 3    magnesium (MAGNESIUM-OXIDE) 250 MG TABS tablet Take 1 tablet by mouth 2 times daily (Patient taking differently: Take 250 mg by mouth daily ) 60 tablet 1     No current facility-administered medications on file prior to visit. Social History     Tobacco Use    Smoking status: Never Smoker    Smokeless tobacco: Never Used   Substance Use Topics    Alcohol use: Yes     Alcohol/week: 0.8 standard drinks     Types: 1 Standard drinks or equivalent per week     Comment: social      CARE TEAM  Patient Care Team:  Jazlyn Irene MD as PCP - General  Jazlyn Irene MD as PCP [facility]  Kim Blas MD as Consulting Physician (Gastroenterology)  Rhett Robbins as Consulting Physician (Physician Assistant)    Chief Complaint   Patient presents with    Diarrhea    Gas      HPI:   Patient is here for a problem visit. Complains of diarrhea, fecal urgency and frequency. Loose and watery. Sometimes will have mucous in stool. Yesterday she went 6 times. Seems like she eats and then has to immediately has to go to the bathroom. Doesn't matter what she eats. Seems to have worsened since thyroid surgery. No blood in stool. Has some blood on the tissue after wiping, feels like this is due to being raw. No abd pain. Has some mild cramping occasionally. No fevers. Some increased gas/bloating. No n/v. Some reflux. On PPI. Did have some trouble with dumping syndrome s/p trell years go, but resolved on its own. Started Mg supplement after thyroid surgery in October. Has had cscope 6 years ago and was normal.   No h/o bowel problems. No FH of bowel disease. ENDO:  Follows with Dr Erika Samuels. Alexander Ibarra. Recent apt 6/21. No changes. Is on Mg supplement daily for hypomagnesia    ROS:  Review of Systems   Constitutional: Negative for activity change, appetite change, chills, fatigue and fever.    Respiratory: Negative for cough, chest tightness, shortness of breath and wheezing. Cardiovascular: Negative for chest pain, palpitations and leg swelling. Gastrointestinal: Positive for anal bleeding (occ) and diarrhea. Negative for abdominal distention, abdominal pain, blood in stool, constipation, nausea, rectal pain and vomiting. Genitourinary: Negative for difficulty urinating, dysuria and pelvic pain. Musculoskeletal: Negative for back pain and myalgias. Skin: Negative for color change, pallor and rash. Neurological: Negative for dizziness, weakness, light-headedness and headaches. VITALS:  /80   Pulse 78   Temp 98.1 °F (36.7 °C) (Oral)   Ht 5' 2\" (1.575 m)   Wt 246 lb 12.8 oz (111.9 kg)   SpO2 98%   BMI 45.14 kg/m²      PE:  Physical Exam  Vitals and nursing note reviewed. Constitutional:       General: She is not in acute distress. Appearance: Normal appearance. She is well-developed. She is obese. She is not diaphoretic. HENT:      Head: Normocephalic and atraumatic. Cardiovascular:      Rate and Rhythm: Normal rate and regular rhythm. Heart sounds: Normal heart sounds. No murmur heard. No friction rub. Pulmonary:      Effort: Pulmonary effort is normal. No respiratory distress. Breath sounds: Normal breath sounds. No wheezing, rhonchi or rales. Abdominal:      General: Abdomen is flat. Bowel sounds are normal. There is no distension. Palpations: Abdomen is soft. There is no mass. Tenderness: There is no abdominal tenderness. There is no guarding or rebound. Hernia: No hernia is present. Musculoskeletal:      Right lower leg: No edema. Left lower leg: No edema. Skin:     General: Skin is warm and dry. Coloration: Skin is not pale. Findings: No erythema or rash. Neurological:      Mental Status: She is alert and oriented to person, place, and time. Motor: No weakness.       Gait: Gait normal.   Psychiatric:         Mood and Affect: Mood normal.         Behavior: Behavior normal.       Lab Results   Component Value Date    MG 1.70 05/26/2021     Lab Results   Component Value Date     05/26/2021    K 3.7 05/26/2021     05/26/2021    CO2 25 05/26/2021    BUN 14 05/26/2021    CREATININE 0.7 05/26/2021    GLUCOSE 120 (H) 05/26/2021    CALCIUM 9.0 05/26/2021    PROT 7.6 05/26/2021    LABALBU 4.7 05/26/2021    BILITOT <0.2 05/26/2021    ALKPHOS 82 05/26/2021    AST 23 05/26/2021    ALT 20 05/26/2021    LABGLOM >60 05/26/2021    GFRAA >60 05/26/2021    AGRATIO 1.6 05/26/2021    GLOB 2.9 05/26/2021     Lab Results   Component Value Date    TSH 6.84 (H) 05/26/2021    FT3 2.2 (L) 05/26/2021    T4FREE 1.6 05/26/2021     ASSESSMENT/PLAN:  1. Diarrhea, unspecified type  Discussed differentials. Low suspicion for infectious etiology. More chronic. ?Mg supplement contributing. Trial of probiotics/digestive enzymes and fiber supplement to add bulk to the stool. If no improvement, may need to consider GI referral for repeat cscope and further testing. Return in about 3 weeks (around 7/14/2021) for diarrhea.      Electronically signed by TROY Williamson CNP on 6/23/2021 at 2:42 PM

## 2021-08-02 DIAGNOSIS — E89.0 HYPOTHYROIDISM, POSTSURGICAL: ICD-10-CM

## 2021-08-02 DIAGNOSIS — E06.3 HASHIMOTO'S THYROIDITIS: ICD-10-CM

## 2021-08-02 DIAGNOSIS — C73 THYROID CANCER (HCC): ICD-10-CM

## 2021-08-02 DIAGNOSIS — E83.42 HYPOMAGNESEMIA: ICD-10-CM

## 2021-08-02 DIAGNOSIS — R73.03 PREDIABETES: ICD-10-CM

## 2021-08-02 DIAGNOSIS — E89.2 POSTSURGICAL HYPOPARATHYROIDISM (HCC): ICD-10-CM

## 2021-08-02 DIAGNOSIS — E21.0 HYPERPARATHYROIDISM, PRIMARY (HCC): ICD-10-CM

## 2021-08-02 LAB
A/G RATIO: 1.6 (ref 1.1–2.2)
ALBUMIN SERPL-MCNC: 4.4 G/DL (ref 3.4–5)
ALP BLD-CCNC: 80 U/L (ref 40–129)
ALT SERPL-CCNC: 27 U/L (ref 10–40)
ANION GAP SERPL CALCULATED.3IONS-SCNC: 15 MMOL/L (ref 3–16)
AST SERPL-CCNC: 24 U/L (ref 15–37)
BILIRUB SERPL-MCNC: <0.2 MG/DL (ref 0–1)
BUN BLDV-MCNC: 13 MG/DL (ref 7–20)
CALCIUM SERPL-MCNC: 8.5 MG/DL (ref 8.3–10.6)
CHLORIDE BLD-SCNC: 99 MMOL/L (ref 99–110)
CO2: 25 MMOL/L (ref 21–32)
CREAT SERPL-MCNC: 1.3 MG/DL (ref 0.6–1.1)
GFR AFRICAN AMERICAN: 52
GFR NON-AFRICAN AMERICAN: 43
GLOBULIN: 2.7 G/DL
GLUCOSE BLD-MCNC: 127 MG/DL (ref 70–99)
POTASSIUM SERPL-SCNC: 3.9 MMOL/L (ref 3.5–5.1)
SODIUM BLD-SCNC: 139 MMOL/L (ref 136–145)
TOTAL PROTEIN: 7.1 G/DL (ref 6.4–8.2)

## 2021-08-04 ENCOUNTER — TELEPHONE (OUTPATIENT)
Dept: ENDOCRINOLOGY | Age: 51
End: 2021-08-04

## 2021-08-04 DIAGNOSIS — R94.4 KIDNEY FUNCTION TEST ABNORMAL: Primary | ICD-10-CM

## 2021-08-04 RX ORDER — CALCITRIOL 0.25 UG/1
0.25 CAPSULE, LIQUID FILLED ORAL DAILY
Qty: 60 CAPSULE | Refills: 3
Start: 2021-08-04 | End: 2022-01-03

## 2021-08-04 NOTE — TELEPHONE ENCOUNTER
Spoke with patient. Will continue calcitriol 0.25 mcg daily. Worsening of kidney function. Creatinine 1.3. Advised patient to contact Dr. Saskia Moore for further recommendations. Previously creatinine 0.7.

## 2021-08-05 NOTE — TELEPHONE ENCOUNTER
Call patient. Dr. Darryle Lobos informed me that patient's kidney function was worsening. I reviewed the labs ordered by Dr. Darryle Lobos. Kidney function tests show creatinine is a little higher than normal and GFR is low. The next steps are to repeat a basic metabolic panel. She does not need to fast. I want patient to avoid and stop any anti-inflammatories if she is taking any. I want her to increase hydration. I will let her know the next steps after the follow up lab is done.

## 2021-09-09 DIAGNOSIS — E78.2 MIXED HYPERLIPIDEMIA: ICD-10-CM

## 2021-09-09 DIAGNOSIS — I10 ESSENTIAL HYPERTENSION: ICD-10-CM

## 2021-09-09 RX ORDER — FENOFIBRATE 145 MG/1
TABLET, COATED ORAL
Qty: 90 TABLET | Refills: 0 | Status: SHIPPED | OUTPATIENT
Start: 2021-09-09 | End: 2021-12-10

## 2021-09-09 RX ORDER — LISINOPRIL AND HYDROCHLOROTHIAZIDE 12.5; 1 MG/1; MG/1
TABLET ORAL
Qty: 90 TABLET | Refills: 0 | Status: SHIPPED | OUTPATIENT
Start: 2021-09-09 | End: 2021-12-10

## 2021-09-26 PROBLEM — E66.813 CLASS 3 SEVERE OBESITY WITH BODY MASS INDEX (BMI) OF 45.0 TO 49.9 IN ADULT: Status: ACTIVE | Noted: 2021-09-26

## 2021-09-26 PROBLEM — E66.01 CLASS 3 SEVERE OBESITY WITH BODY MASS INDEX (BMI) OF 45.0 TO 49.9 IN ADULT (HCC): Status: ACTIVE | Noted: 2021-09-26

## 2021-09-27 ENCOUNTER — OFFICE VISIT (OUTPATIENT)
Dept: ENDOCRINOLOGY | Age: 51
End: 2021-09-27
Payer: COMMERCIAL

## 2021-09-27 VITALS
OXYGEN SATURATION: 97 % | BODY MASS INDEX: 46.08 KG/M2 | TEMPERATURE: 98 F | DIASTOLIC BLOOD PRESSURE: 74 MMHG | WEIGHT: 250.4 LBS | HEART RATE: 90 BPM | RESPIRATION RATE: 14 BRPM | HEIGHT: 62 IN | SYSTOLIC BLOOD PRESSURE: 126 MMHG

## 2021-09-27 DIAGNOSIS — C73 THYROID CANCER (HCC): ICD-10-CM

## 2021-09-27 DIAGNOSIS — R73.03 PREDIABETES: ICD-10-CM

## 2021-09-27 DIAGNOSIS — M85.80 OSTEOPENIA, UNSPECIFIED LOCATION: ICD-10-CM

## 2021-09-27 DIAGNOSIS — E28.319 PREMATURE MENOPAUSE: ICD-10-CM

## 2021-09-27 DIAGNOSIS — E06.3 HASHIMOTO'S THYROIDITIS: ICD-10-CM

## 2021-09-27 DIAGNOSIS — E21.0 HYPERPARATHYROIDISM, PRIMARY (HCC): Primary | ICD-10-CM

## 2021-09-27 DIAGNOSIS — E89.0 HYPOTHYROIDISM, POSTSURGICAL: ICD-10-CM

## 2021-09-27 DIAGNOSIS — E66.01 CLASS 3 SEVERE OBESITY WITH SERIOUS COMORBIDITY AND BODY MASS INDEX (BMI) OF 45.0 TO 49.9 IN ADULT, UNSPECIFIED OBESITY TYPE (HCC): ICD-10-CM

## 2021-09-27 DIAGNOSIS — E89.2 POSTSURGICAL HYPOPARATHYROIDISM (HCC): ICD-10-CM

## 2021-09-27 DIAGNOSIS — E83.42 HYPOMAGNESEMIA: ICD-10-CM

## 2021-09-27 PROBLEM — E83.52 HYPERCALCEMIA: Status: RESOLVED | Noted: 2020-06-12 | Resolved: 2021-09-27

## 2021-09-27 PROCEDURE — 99215 OFFICE O/P EST HI 40 MIN: CPT | Performed by: INTERNAL MEDICINE

## 2021-09-27 RX ORDER — ERGOCALCIFEROL (VITAMIN D2) 50 MCG
CAPSULE ORAL
Qty: 30 CAPSULE | Refills: 0
Start: 2021-09-27

## 2021-09-27 RX ORDER — MULTIVITAMIN WITH IRON
250 TABLET ORAL 2 TIMES DAILY
Qty: 60 TABLET | Refills: 1 | Status: SHIPPED | OUTPATIENT
Start: 2021-09-27

## 2021-09-27 NOTE — PROGRESS NOTES
SUBJECTIVE:  Makeda Sotelo is a 45 y.o. female who is being evaluated for hyperparathyroidism. 1. Hyperparathyroidism, primary   This started in 2020. Patient was diagnosed with hypercalcemia. The problem has been gradually worsening. Patient started medication in 2020. Currently patient is on: N/A. Misses  N/A doses a month. Current complaints: fatigue, easy gets hot  No history of kidney stones  No persistent constipation, aches, depression  Subtotal parathyroidectomy 10/16/2020, right superior, left superior, left inferior    2. Thyroid cancer  EXAM: NM THYROID CARCINOMA ABLATION        INDICATION: Malignant neoplasm of thyroid gland (CMS Dx) 39-year-old female with a history of    papillary thyroid cancer status post thyroidectomy here for postthyroidectomy radioablation.        RADIOPHARMACEUTICAL:  I-131 capsule 52.1 mCi PO            Thyroid, total thyroidectomy:  -  Multifocal papillary thyroid carcinoma, classic type, involving the left  lobe (1.1 x 0.8 x 0.7 cm) and right lobe (3 mm). -  Negative for extrathyroidal extension.  -  Background thyroid with chronic lymphocytic thyroiditis and nodular  thyroid hyperplasia with adenomatoid nodules. -  Surgical resection margins are free of malignancy. History of obstructive symptoms: difficulty swallowing No, changes in voice/hoarseness No.  History of radiation to patient's neck: No  Resent iodine exposure: No  Family history includes no thyroid abnormalities. Family history of thyroid cancer: No    3. Premature menopause  Menopause since age 38  Was on Estrogen    4. Class 3 severe obesity with body mass index (BMI) of 45.0 to 49.9 in adult, unspecified obesity type, unspecified whether serious comorbidity present ([address]  He is moderately healthy, active    5. Hashimoto's thyroiditis  Has fatigue    6. Osteopenia  Calcium in diet  No bone pain    7. Postsurgical hypoparathyroidism  Has numbness, tingling in hands.   Not in face anymore, had TECHNIQUE:    A bone density DEXA scan was performed of the lumbar spine, bilateral hips    and left forearm on a Hologic system.         COMPARISON:    None.         HISTORY:    ORDERING SYSTEM PROVIDED HISTORY: Hyperparathyroidism, primary [facility]    TECHNOLOGIST PROVIDED HISTORY:    Reason for exam:->Hyperparathyroidism. Please include non-dominant forearm. Is the patient pregnant?->No         FINDINGS:    LEFT FOREARM:         The BMD of the middle third of the radius of the distal forearm equals 0.64    g/cm2.  The T- and Z-scores are -0.9 and -0.2 standard deviations from the    mean.  This is within the normal range by WHO criteria.         LUMBAR SPINE:         The bone mineral density in the lumbar spine including the L1-L4 levels is    measured at 0.89 g/cm2, which corresponds to a T-score of -1.4 and a Z-score    of -0.6.  This is within the osteopenia range by WHO criteria.         LEFT HIP:         The bone mineral density in the total hip is measured at 0.92 g/cm2    corresponding to a T-score of -0.2 and a Z-score of 0.3.  This is within the    normal range by [facility] criteria.         The bone mineral density of the femoral neck is measured at 0.75 g/cm2    corresponding to a T-score of -0.9 and a Z-score of -0.1.  This is within the    normal range by WHO criteria.         RIGHT HIP:         The bone mineral density in the total hip is measured at 0.90 g/cm2    corresponding to a T-score of -0.3 and a Z-score of 0.2.  This is within the    normal range by WHO criteria.         The bone mineral density of the femoral neck is measured at 0.71 g/cm2    corresponding to a T-score of -1.2 and a Z-score of -0.5.  This is within the    osteopenia range by WHO criteria.         10 year risk for major osteoporotic fracture is 3.8% and risk of a hip    fracture is 0.2%.              Impression    Osteopenia by WHO criteria.                 Past Medical History:   Diagnosis Date    Anxiety     Endometriosis  Hyperlipidemia     Hypertension      Patient Active Problem List    Diagnosis Date Noted    Class 3 severe obesity with body mass index (BMI) of 45.0 to 49.9 in adult [facility]) 09/26/2021    Prediabetes 03/09/2021    Mixed hyperlipidemia 03/09/2021    Snoring 03/09/2021    Hypothyroidism, postsurgical 12/08/2020    Postsurgical hypopar[facility]roidism [address] 12/08/2020    Hypomagnesemia 12/08/2020    Thyroid cancer ([facility] 12/08/2020    Hashimoto's thyroiditis 08/06/2020    Osteopenia 08/01/2020    Class 3 severe obesity with body mass index (BMI) of 40.0 to 44.9 in adult [facility]) 07/07/2020    Hyperparathyroidism, primary ([facility] 07/07/2020    Premature menopause 07/07/2020    Epigastric abdominal pain 06/12/2020    Nausea 06/12/2020    Low back pain without sciatica 06/12/2020    Diarrhea 06/12/2020    Allergic rhinitis 03/20/2020    Pain of right middle finger 03/20/2020    Anxiety 05/10/2018    Gastroesophageal reflux disease 10/30/2016    Surgical menopause on hormone replacement therapy 12/17/2015    Hypertriglyceridemia 12/17/2015    HTN (hypertension) 07/01/2015    Colon polyp 03/18/2015     Past Surgical History:   Procedure Laterality Date    ACHILLES TENDON SURGERY Right 6/24/2019    RIGHT ACHILLES TENDON RELEASE AND HEEL EXOSTECTOMY performed by Douglas Tao DPM at [address]  2004   [address] COLONOSCOPY  03/18/2015    Brien GOMEZ    FOOT SURGERY Left 2013    left foot recontstructon    HYSTERECTOMY  2013    complete - vaginal hyst    THYROIDECTOMY      TUBAL LIGATION  2013    with ablation    US THYROID BIOPSY  7/31/2020    US THYROID BIOPSY FZ ULTRASOUND     Family History   Problem Relation Age of Onset    Diabetes Mother     High Blood Pressure Mother     Stroke Mother     High Blood Pressure Father     Cancer Maternal Aunt 58        breast    Cancer Maternal Grandmother 72        colon cancer    Cancer Paternal Grandmother         lung     Social History     Socioeconomic History    Marital status:      Spouse name: None    Number of children: None    Years of education: None    Highest education level: None   Occupational History    None   Tobacco Use    Smoking status: Never Smoker    Smokeless tobacco: Never Used   Vaping Use    Vaping Use: Never used   Substance and Sexual Activity    Alcohol use: Yes     Alcohol/week: 0.8 standard drinks     Types: 1 Standard drinks or equivalent per week     Comment: social    Drug use: No    Sexual activity: Yes     Partners: Male   Other Topics Concern    None   Social History Narrative    None     Social Determinants of Health     Financial Resource Strain: Low Risk     Difficulty of Paying Living Expenses: Not hard at all   Food Insecurity: No Food Insecurity    Worried About Running Out of Food in the Last Year: Never true    Maria Guadalupe of Food in the Last Year: Never true   Transportation Needs:     Lack of Transportation (Medical):      Lack of Transportation (Non-Medical):    Physical Activity:     Days of Exercise per Week:     Minutes of Exercise per Session:    Stress:     Feeling of Stress :    Social Connections:     Frequency of Communication with Friends and Family:     Frequency of Social Gatherings with Friends and Family:     Attends Denominational Services:     Active Member of Clubs or Organizations:     Attends Club or Organization Meetings:     Marital Status:    Intimate Partner Violence:     Fear of Current or Ex-Partner:     Emotionally Abused:     Physically Abused:     Sexually Abused:      Current Outpatient Medications   Medication Sig Dispense Refill    pantoprazole (PROTONIX) 40 MG tablet TAKE ONE TABLET BY MOUTH TWICE A  tablet 0    lisinopril-hydroCHLOROthiazide (PRINZIDE;ZESTORETIC) 10-12.5 MG per tablet TAKE ONE TABLET BY MOUTH DAILY 90 tablet 0    fenofibrate (TRICOR) 145 MG tablet TAKE ONE TABLET BY MOUTH DAILY 90 tablet 0    calcitRIOL (ROCALTROL) 0.25 MCG capsule Take 1 capsule by mouth daily 60 capsule 3    levothyroxine (SYNTHROID) 200 MCG tablet Take 1 tablet by mouth daily 90 tablet 1    sertraline (ZOLOFT) 50 MG tablet Take 1 tablet by mouth daily 90 tablet 1    magnesium (MAGNESIUM-OXIDE) 250 MG TABS tablet Take 1 tablet by mouth 2 times daily (Patient taking differently: Take 250 mg by mouth daily ) 60 tablet 1    Vitamin D, Ergocalciferol, 50 MCG (2000 UT) CAPS 1 caps daily 30 capsule 0    Calcium Carbonate Antacid (TUMS ULTRA 1000) 1000 MG CHEW 1 tablet twice daily 60 tablet 0    Acetaminophen (TYLENOL ARTHRITIS PAIN PO) Take by mouth PRN      promethazine (PHENERGAN) 25 MG tablet Take 1 tablet by mouth every 6 hours as needed for Nausea 20 tablet 0    Multiple Vitamins-Minerals (THERAPEUTIC MULTIVITAMIN-MINERALS) tablet Take 1 tablet by mouth daily      fluticasone (FLONASE) 50 MCG/ACT nasal spray 2 sprays by Nasal route daily. 1 Bottle 3     No current facility-administered medications for this visit.      Allergies   Allergen Reactions    Sulfa Antibiotics Rash     Family Status   Relation Name Status    Mother  Alive    Father  Alive    [facility]  (Not Specified)    MGM  (Not Specified)    Sister  Alive    Brother  Alive    PGM  (Not Specified)   Breanna Judd Sister   at age 32        Down Syndrome       Review of Systems:  Constitutional: has fatigue, no fever, no recent weight gain, no recent weight loss, no changes in appetite  Eyes: no eye pain, no change in vision, no eye redness, no eye irritation, no double vision  Ears, nose, throat: no nasal congestion, no sore throat, no earache, no decrease in hearing, no hoarseness, no dry mouth, no sinus problems, no difficulty swallowing, no neck lumps, no dental problems, no mouth sores, no ringing in ears  Pulmonary: no shortness of breath, no wheezing, no dyspnea on exertion, no cough  Cardiovascular: no chest pain, no lower extremity edema, no orthopnea, no intermittent leg claudication, no palpitations  Gastrointestinal: has abdominal pain, no nausea, no vomiting, has diarrhea, has constipation, no dysphagia, has heartburn, no bloating  Genitourinary: no dysuria, no urinary incontinence, no urinary hesitancy, no urinary frequency, no feelings of urinary urgency, no nocturia  Musculoskeletal: no joint swelling, no joint stiffness, has joint pain, has muscle cramps, no muscle pain, no bone pain  Integument/Breast: no hair loss, no skin rashes, no skin lesions, no itching, no dry skin  Neurological: no numbness, no tingling, no weakness, no confusion, no headaches, no dizziness, no fainting, no tremors, no decrease in memory, no balance problems  Psychiatric: no anxiety, no depression, no insomnia  Hematologic/Lymphatic: no tendency for easy bleeding, no swollen lymph nodes, no tendency for easy bruising  Immunology: has seasonal allergies, no frequent infections, no frequent illnesses  Endocrine: has temperature intolerance    /74   Pulse 90   Temp 98 °F (36.7 °C)   Resp 14   Ht 5' 2\" (1.575 m)   Wt 250 lb 6.4 oz (113.6 kg)   SpO2 97%   BMI 45.80 kg/m²    Wt Readings from Last 3 Encounters:   09/27/21 250 lb 6.4 oz (113.6 kg)   06/23/21 246 lb 12.8 oz (111.9 kg)   06/21/21 246 lb 9.6 oz (111.9 kg)     Body mass index is 45.8 kg/m².     OBJECTIVE:  Constitutional: no acute distress, well appearing and well nourished  Psychiatric: oriented to person, place and time, judgement and insight and normal, recent and remote memory and intact and mood and affect are normal  Skin: skin and subcutaneous tissue is normal without mass, normal turgor  Head and Face: examination of head and face revealed no abnormalities  Eyes: no lid or conjunctival swelling, erythema or discharge, pupils are normal, equal, round, reactive to light  Ears/Nose: external inspection of ears and nose revealed no abnormalities, hearing is grossly normal  Oropharynx/Mouth/Face: lips, tongue and gums are normal with no lesions, the voice quality was normal  Neck: neck is supple and symmetric, with midline trachea and no masses, thyroid is absent, no palpable masses  Lymphatics: normal cervical lymph nodes, normal supraclavicular nodes  Pulmonary: no increased work of breathing or signs of respiratory distress, lungs are clear to auscultation  Cardiovascular: normal heart rate and rhythm, normal S1 and S2, no murmurs and pedal pulses and 2+ bilaterally, No edema  Abdomen: abdomen is soft, non-tender with no masses  Musculoskeletal: normal gait and station and exam of the digits and nails are normal  Neurological: normal coordination and normal general cortical function      Lab Review:    Lab Results   Component Value Date    WBC 6.4 10/05/2020    HGB 12.5 10/05/2020    HCT 37.6 10/05/2020    MCV 81.3 10/05/2020     10/05/2020     Lab Results   Component Value Date     08/06/2021    K 3.8 08/06/2021     08/06/2021    CO2 25 08/06/2021    BUN 16 08/06/2021    CREATININE 0.9 08/06/2021    GLUCOSE 116 08/06/2021    CALCIUM 8.4 08/06/2021    PROT 7.1 08/02/2021    LABALBU 4.4 08/02/2021    BILITOT <0.2 08/02/2021    ALKPHOS 80 08/02/2021    AST 24 08/02/2021    ALT 27 08/02/2021    LABGLOM >60 08/06/2021    GFRAA >60 08/06/2021    AGRATIO 1.6 08/02/2021    GLOB 2.7 08/02/2021     Lab Results   Component Value Date    TSH 0.97 09/17/2021    FT3 2.8 09/17/2021     Lab Results   Component Value Date    LABA1C 6.2 03/12/2021     Lab Results   Component Value Date    .2 03/12/2021     Lab Results   Component Value Date    CHOL 130 03/12/2021     Lab Results   Component Value Date    TRIG 152 03/12/2021     Lab Results   Component Value Date    HDL 24 03/12/2021     Lab Results   Component Value Date    LDLCALC 76 03/12/2021     Lab Results   Component Value Date    LABVLDL 30 03/12/2021     No results found for: [facility]  Lab Results Component Value Date    LABMICR YES 06/05/2020     Lab Results   Component Value Date    VITD25 37.6 09/17/2021        ASSESSMENT/PLAN:  1. Hyperparathyroidism, primary   Calcium - Tums 1000 mg bid  Vitamin D 2000 IU daily  Magnesium 250 mg daily  Calcium 8.7-8.6-9.0-8.5-8.4  Albumin 4.5-4.4  PTH 8.5-11.4-14.1  Magnesium 1.7-1.7-1.7-1.5  25 hydroxy vitamin D 41.5-47.0-44.2-37.6  TSH 2.1  Subtotal parathyroidectomy-right superior, left superior, left inferior 10/16/2020   Total thyroidectomy    - Vitamin D 25 Hydroxy; Future  - Calcium Ionized Serum; Future  - PTH, Intact; Future    2. Thyroid cancer  Thyroglobulin less than 0.5-<0.1-<0.5-<0.5  Thyroglobulin antibody 13    1/2021  RADIOPHARMACEUTICAL:  I-131 capsule 52.1 mCi PO      Total thyroidectomy 10/16/2020  [id number]  Pre-Operative Diagnosis: Multinodular goiter, hypercalcemia  Post-Operative Diagnosis:     Same  Specimen(s) Submitted:   A. Rt. superior parathyroid; B. Lt. superior  parathyroid; C. Total thyroid; D. Lt. inferior parathyroid  A.  Right superior parathyroid, parathyroidectomy:  -  Hypercellular parathyroid gland (35 mg). B.  Left superior parathryoid, parathyroidectomy:  -  Enlarged, hypercellular parathyroid (190 mg). C.  Thyroid, total thyroidectomy:  -  Multifocal papillary thyroid carcinoma, classic type, involving the left  lobe (1.1 x 0.8 x 0.7 cm) and right lobe (3 mm). -  Negative for extrathyroidal extension.  -  Background thyroid with chronic lymphocytic thyroiditis and nodular  thyroid hyperplasia with adenomatoid nodules. -  Surgical resection margins are free of malignancy. Annemarie Blake synoptic report for details. D.  Left inferior parathyroid, parathyroidectomy:  -  Benign parathyroid tissue (20 mg). SYNOPTIC REPORT:  Report Name: Thyroid Gland - Resection,  Version: [de-identified],   Inst#: 1  Specimen    Procedure:  Total thyroidectomy  Tumor    Tumor Focality: Multifocal    Tumor Characteristics      Tumor Site: Left lobe      Histologic Type: Papillary carcinoma, classic (usual,                       conventional)      Tumor Size: 1.1 x 0.8 x 0.7 Centimeters (cm)      Extrathyroidal Extension: Not identified      Angioinvasion (vascular invasion): Not identified      Lymphatic Invasion: Not identified      Margins: Uninvolved by carcinoma  Lymph Nodes    Regional Lymph Nodes: No lymph nodes submitted or found  Pathologic Stage Classification (pTNM, AJCC 8th Edition)    TNM Descriptors: m (multiple primary tumors)    Primary Tumor (pT): pT1b    Regional Lymph Nodes (pN): pNX  Comments      Second small focus of papillary thyroid carcinoma is present in the  right lobe measuring 3 mm. - US Head Neck Soft Tissue Thyroid; Future  -Thyroglobulin  -Thyroglobulin antibody  EXAM:  NM THYROID METASTASES LIMITED   EXAM: NM THYROID METAS UPTAKE ADDL       INDICATION: Malignant neoplasm of thyroid gland (CMS Dx); This patient is referred for the    evaluation of residual thyroid tissue post thyroidectomy.       RADIOPHARMACEUTICAL:  Recombinant TSH 0.9 mg IM x 2, I-123 capsule 335 uCi PO       TECHNICAL:  A limited thyroid cancer workup was performed using the recombinant TSH protocol.     Recombinant TSH was administered prior to the administration of iodine-123.  Imaging of the    neck and chest was performed 24 hours following radioiodine-123 administration. A quantitative    uptake was also performed at the level of the neck.            COMPARISON:  None           FINDINGS:        Images demonstrate a focal area of uptake in the anterior neck.        The 24 hour radioiodine uptake at the level of the neck is 0.8%.           3. Class 3 severe obesity with body mass index (BMI) of 45.0 to 49.9 in adult, unspecified obesity type, unspecified whether serious comorbidity present (HCC)  Diet and exercise    4. Premature menopause  Calcium and vitamin D supplementation  DEXA scan    5. Hashimoto's thyroiditis  TSH 2.34    6.   Osteopenia  Calcium, vitamin D supplementation was discussed, written instructions were given how to calculate calcium in diet  Calcium, vitamin D  Counseled patient on risk fracture  10 year risk for major osteoporotic fracture is 3.8% and risk of a hip    fracture is 0.2%. 7.  Postsurgical hypothyroidism  Uncontrolled  Increase levothyroxine 0.200 mg daily  Total thyroidectomy 10/16/2020  TSH 2.1-2.4-2.34-6.84-0.97  -TSH  -Free T4  -Free T3    8. Postsurgical hypoparathyroidism  1000 mg Tums bid  Calcitriol 0.25 mcg qd  Increase Vitamin D3 to 4000 IU   PTH 5.9-11.4  -CMP  -PTH  -Ionized calcium  -Phosphorus  -Magnesium  -25 hydroxy vitamin D    9. Hypomagnesia  Uncontrolled  Magnesium 250 mg only taking one because of diarrhea  Magnesium 1.8-1.7-1.5  -Magnesium    10.   Prediabetes  Glucose 114-104-116  HbA1C 6.2    Reviewed and/or ordered clinical lab results Yes  Reviewed and/or ordered radiology tests Yes   Reviewed and/or ordered other diagnostic tests No  Discussed test results with performing physician No  Independently reviewed image, tracing, or specimen No  Made a decision to obtain old records No  Reviewed and summarized old records Yes   Calcium elevated since 2015, later upper limit of normal, in 2020 elevated again  PTH 38  Calcium 11.1-10.5-10.2-10.1-10.8-11.3, upper limit normal 10.6  TSH 1.4  RADIOPHARMACEUTICAL:  I-131 capsule 52.1 mCi PO            Obtained history from other than patient No    Mackenzie Núñez was counseled regarding symptoms of primary hyperparathyroidism, thyroid cancer, postsurgical hypothyroidism, postsurgical hypoparathyroidism, hypomagnesemia, premature menopause diagnosis, course and complications of disease if inadequately treated, side effects of medications, diagnosis, treatment options, and prognosis, risks, benefits, complications, and alternatives of treatment, labs, imaging and other studies and treatment targets and goals, thyroid cancer management, surveillance, recurrence prevention, hypoparathyroidism management, follow-up and monitoring, medication adjustments    She understands instructions and counseling. Total time I spent for this encounter 40 minutes    Return in about 3 months (around 12/27/2021) for parathyroid.     Electronically signed by Jesus Olvera MD on 9/27/2021 at 4:04 PM

## 2021-12-06 ENCOUNTER — NURSE TRIAGE (OUTPATIENT)
Dept: OTHER | Facility: CLINIC | Age: 51
End: 2021-12-06

## 2021-12-06 NOTE — TELEPHONE ENCOUNTER
Received call from Davina Paz at [facility] with Red Flag Complaint. Brief description of triage: Heart rate questions, see below    Triage indicates for patient to Be seen today in office, was told walk in/UCC if no appointments available. Care advice provided, patient verbalizes understanding; denies any other questions or concerns; instructed to call back for any new or worsening symptoms. Writer provided warm transfer to [facility] at [facility] for appointment scheduling. Attention Provider: Thank you for allowing me to participate in the care of your patient. The patient was connected to triage in response to information provided to the ECC/PSC. Please do not respond through this encounter as the response is not directed to a shared pool. Reason for Disposition   Skipped or extra beat(s) and increases with exercise or exertion    Answer Assessment - Initial Assessment Questions  1. DESCRIPTION: \"Please describe your heart rate or heart beat that you are having\" (e.g., fast/slow, regular/irregular, skipped or extra beats, \"palpitations\")  Patient states she feels like how when you get nervous or scared, patient states she got sleepy, and when she laid on left side, it got better. Patient states she feels like there are some skipped beats. 2. ONSET: \"When did it start? \" (Minutes, hours or days)       Yesterday    3. DURATION: \"How long does it last\" (e.g., seconds, minutes, hours)      10-15 minutes    4. PATTERN \"Does it come and go, or has it been constant since it started? \"  \"Does it get worse with exertion? \"   \"Are you feeling it now? \"  Constant today, worse with exertion, feeling now        5. TAP: \"Using your hand, can you tap out what you are feeling on a chair or table in front of you, so that I can hear? \" (Note: not all patients can do this)        N/A    6. HEART RATE: \"Can you tell me your heart rate? \" \"How many beats in 15 seconds? \"  (Note: not all patients can do this)    Faster than normal        7. RECURRENT SYMPTOM: \"Have you ever had this before? \" If so, ask: \"When was the last time? \" and \"What happened that time? \"       Has happened before since her thyroid was removed about a year ago, but episodes have never lasted this long. 8. CAUSE: \"What do you think is causing the palpitations? \"  Doesn't know        9. CARDIAC HISTORY: \"Do you have any history of heart disease? \" (e.g., heart attack, angina, bypass surgery, angioplasty, arrhythmia)   HTN        10. OTHER SYMPTOMS: \"Do you have any other symptoms? \" (e.g., dizziness, chest pain, sweating, difficulty breathing)        If she talks a lot, has to stop to catch her breath, more fatigued than normal, denies others    11. PREGNANCY: \"Is there any chance you are pregnant? \" \"When was your last menstrual period? \"        N/A    Protocols used: HEART RATE AND HEARTBEAT QUESTIONS-ADULT-[place]

## 2021-12-07 ENCOUNTER — OFFICE VISIT (OUTPATIENT)
Dept: PRIMARY CARE CLINIC | Age: 51
End: 2021-12-07
Payer: COMMERCIAL

## 2021-12-07 VITALS
HEART RATE: 71 BPM | DIASTOLIC BLOOD PRESSURE: 75 MMHG | HEIGHT: 64 IN | SYSTOLIC BLOOD PRESSURE: 130 MMHG | TEMPERATURE: 97.2 F | BODY MASS INDEX: 42.78 KG/M2 | OXYGEN SATURATION: 99 % | WEIGHT: 250.6 LBS

## 2021-12-07 DIAGNOSIS — R00.2 PALPITATIONS: ICD-10-CM

## 2021-12-07 DIAGNOSIS — R00.2 PALPITATIONS: Primary | ICD-10-CM

## 2021-12-07 LAB
ANION GAP SERPL CALCULATED.3IONS-SCNC: 12 MMOL/L (ref 3–16)
BASOPHILS ABSOLUTE: 0.1 K/UL (ref 0–0.2)
BASOPHILS RELATIVE PERCENT: 1.3 %
BUN BLDV-MCNC: 15 MG/DL (ref 7–20)
CALCIUM SERPL-MCNC: 8.6 MG/DL (ref 8.3–10.6)
CHLORIDE BLD-SCNC: 97 MMOL/L (ref 99–110)
CO2: 28 MMOL/L (ref 21–32)
CREAT SERPL-MCNC: 0.8 MG/DL (ref 0.6–1.1)
EOSINOPHILS ABSOLUTE: 0.2 K/UL (ref 0–0.6)
EOSINOPHILS RELATIVE PERCENT: 3.1 %
GFR AFRICAN AMERICAN: >60
GFR NON-AFRICAN AMERICAN: >60
GLUCOSE BLD-MCNC: 95 MG/DL (ref 70–99)
HCT VFR BLD CALC: 37.9 % (ref 36–48)
HEMOGLOBIN: 12.1 G/DL (ref 12–16)
LYMPHOCYTES ABSOLUTE: 1.4 K/UL (ref 1–5.1)
LYMPHOCYTES RELATIVE PERCENT: 24.7 %
MAGNESIUM: 1.9 MG/DL (ref 1.8–2.4)
MCH RBC QN AUTO: 25.5 PG (ref 26–34)
MCHC RBC AUTO-ENTMCNC: 31.9 G/DL (ref 31–36)
MCV RBC AUTO: 79.9 FL (ref 80–100)
MONOCYTES ABSOLUTE: 0.5 K/UL (ref 0–1.3)
MONOCYTES RELATIVE PERCENT: 8.3 %
NEUTROPHILS ABSOLUTE: 3.5 K/UL (ref 1.7–7.7)
NEUTROPHILS RELATIVE PERCENT: 62.6 %
PDW BLD-RTO: 14.7 % (ref 12.4–15.4)
PLATELET # BLD: 351 K/UL (ref 135–450)
PMV BLD AUTO: 7.4 FL (ref 5–10.5)
POTASSIUM SERPL-SCNC: 3.8 MMOL/L (ref 3.5–5.1)
RBC # BLD: 4.74 M/UL (ref 4–5.2)
SODIUM BLD-SCNC: 137 MMOL/L (ref 136–145)
TSH SERPL DL<=0.05 MIU/L-ACNC: 1.03 UIU/ML (ref 0.27–4.2)
WBC # BLD: 5.7 K/UL (ref 4–11)

## 2021-12-07 PROCEDURE — 93000 ELECTROCARDIOGRAM COMPLETE: CPT | Performed by: INTERNAL MEDICINE

## 2021-12-07 PROCEDURE — 99213 OFFICE O/P EST LOW 20 MIN: CPT | Performed by: INTERNAL MEDICINE

## 2021-12-07 ASSESSMENT — ENCOUNTER SYMPTOMS
CHEST TIGHTNESS: 0
SHORTNESS OF BREATH: 0

## 2021-12-07 NOTE — PROGRESS NOTES
Mackenzie Underwood   Date ofBirth:  1970    Date of Visit:  12/7/2021    Chief Complaint   Patient presents with    Palpitations     since sunday       HPI  Patient complains of palpitations that started Sunday and were most of the day and most of the day until yesterday. Patient denies chest pain and SOB. Patient states she has some nausea but it is not new. Patient states she drinks caffeine 2 bottles 16 ounces of Diet Pepsi per day. Patient denies energy drinks, decongestants, and dietary pills. Patient denies increase in usual anxiety. Review of Systems   Constitutional: Negative for chills and fever. Eyes: Negative for visual disturbance. Respiratory: Negative for chest tightness and shortness of breath. Cardiovascular: Positive for palpitations. Negative for chest pain and leg swelling. Genitourinary: Negative for frequency and hematuria. Neurological: Negative for dizziness, syncope, light-headedness and headaches.        Allergies   Allergen Reactions    Sulfa Antibiotics Rash     Outpatient Medications Marked as Taking for the 12/7/21 encounter (Office Visit) with Rg Christian MD   Medication Sig Dispense Refill    sertraline (ZOLOFT) 50 MG tablet TAKE ONE TABLET BY MOUTH DAILY 30 tablet 0    magnesium (MAGNESIUM-OXIDE) 250 MG TABS tablet Take 1 tablet by mouth 2 times daily 60 tablet 1    Vitamin D, Ergocalciferol, 50 MCG (2000 UT) CAPS 2 caps daily 30 capsule 0    pantoprazole (PROTONIX) 40 MG tablet TAKE ONE TABLET BY MOUTH TWICE A  tablet 0    lisinopril-hydroCHLOROthiazide (PRINZIDE;ZESTORETIC) 10-12.5 MG per tablet TAKE ONE TABLET BY MOUTH DAILY 90 tablet 0    fenofibrate (TRICOR) 145 MG tablet TAKE ONE TABLET BY MOUTH DAILY 90 tablet 0    calcitRIOL (ROCALTROL) 0.25 MCG capsule Take 1 capsule by mouth daily 60 capsule 3    levothyroxine (SYNTHROID) 200 MCG tablet Take 1 tablet by mouth daily 90 tablet 1    Calcium Carbonate Antacid (TUMS ULTRA 1000) 1000 MG CHEW 1 tablet twice daily 60 tablet 0    Acetaminophen (TYLENOL ARTHRITIS PAIN PO) Take by mouth PRN      promethazine (PHENERGAN) 25 MG tablet Take 1 tablet by mouth every 6 hours as needed for Nausea 20 tablet 0    Multiple Vitamins-Minerals (THERAPEUTIC MULTIVITAMIN-MINERALS) tablet Take 1 tablet by mouth daily      fluticasone (FLONASE) 50 MCG/ACT nasal spray 2 sprays by Nasal route daily. 1 Bottle 3         Vitals:    12/07/21 0903   BP: 130/75   Site: Right Upper Arm   Position: Sitting   Cuff Size: Medium Adult   Pulse: 71   Temp: 97.2 °F (36.2 °C)   TempSrc: Infrared   SpO2: 99%   Weight: 250 lb 9.6 oz (113.7 kg)   Height: 5' 3.84\" (1.622 m)     Body mass index is 43.23 kg/m². Physical Exam  Nursing note reviewed. Constitutional:       General: She is not in acute distress. Appearance: Normal appearance. She is well-developed. Eyes:      Extraocular Movements: Extraocular movements intact. Pupils: Pupils are equal, round, and reactive to light. Neck:      Thyroid: No thyromegaly. Vascular: No carotid bruit. Cardiovascular:      Rate and Rhythm: Normal rate and regular rhythm. Heart sounds: Normal heart sounds, S1 normal and S2 normal. No murmur heard. Pulmonary:      Effort: Pulmonary effort is normal.      Breath sounds: Normal breath sounds. Musculoskeletal:      Cervical back: Neck supple. Right lower leg: No edema. Left lower leg: No edema. Neurological:      Mental Status: She is alert. No results found for this visit on 12/07/21.   Lab Review   Orders Only on 09/17/2021   Component Date Value    Calcium, Ion 09/17/2021 1.15     Calcium,Ion ph 7.4 09/17/2021 1.12     PTH 09/17/2021 14.1     Phosphorus 09/17/2021 2.7     Magnesium 09/17/2021 1.50*    Vit D, 25-Hydroxy 09/17/2021 37.6     Thyroglobulin by LC-MS/M* 09/17/2021 <0.5*    Anti-Thyroglob Abs 09/17/2021 12     TSH 09/17/2021 0.97     T4 Free 09/17/2021 1.9*    T3, Free 09/17/2021 2.8    Orders Only on 08/06/2021   Component Date Value    Sodium 08/06/2021 141     Potassium 08/06/2021 3.8     Chloride 08/06/2021 101     CO2 08/06/2021 25     Anion Gap 08/06/2021 15     Glucose 08/06/2021 116*    BUN 08/06/2021 16     CREATININE 08/06/2021 0.9     GFR Non- 08/06/2021 >60     GFR  08/06/2021 >60     Calcium 08/06/2021 8.4    Orders Only on 08/02/2021   Component Date Value    Sodium 08/02/2021 139     Potassium 08/02/2021 3.9     Chloride 08/02/2021 99     CO2 08/02/2021 25     Anion Gap 08/02/2021 15     Glucose 08/02/2021 127*    BUN 08/02/2021 13     CREATININE 08/02/2021 1.3*    GFR Non- 08/02/2021 43*    GFR  08/02/2021 52*    Calcium 08/02/2021 8.5     Total Protein 08/02/2021 7.1     Albumin 08/02/2021 4.4     Albumin/Globulin Ratio 08/02/2021 1.6     Total Bilirubin 08/02/2021 <0.2     Alkaline Phosphatase 08/02/2021 80     ALT 08/02/2021 27     AST 08/02/2021 24     Globulin 08/02/2021 2.7          Assessment/Plan     1. Palpitations  - discussed palpitations and potential causes   - CBC Auto Differential; Future  - TSH without Reflex; Future  - Basic Metabolic Panel; Future  - Magnesium; Future  - EKG 12 lead  - decrease caffeine  - avoid decongestants  - avoid energy drinks        Return in 1 week (on 12/14/2021) for chronic medical conditions as previously scheduled.

## 2021-12-07 NOTE — PATIENT INSTRUCTIONS

## 2021-12-10 DIAGNOSIS — I10 ESSENTIAL HYPERTENSION: ICD-10-CM

## 2021-12-10 DIAGNOSIS — E78.2 MIXED HYPERLIPIDEMIA: ICD-10-CM

## 2021-12-10 RX ORDER — LISINOPRIL AND HYDROCHLOROTHIAZIDE 12.5; 1 MG/1; MG/1
TABLET ORAL
Qty: 90 TABLET | Refills: 0 | Status: SHIPPED | OUTPATIENT
Start: 2021-12-10 | End: 2022-03-14

## 2021-12-10 RX ORDER — FENOFIBRATE 145 MG/1
TABLET, COATED ORAL
Qty: 90 TABLET | Refills: 0 | Status: SHIPPED | OUTPATIENT
Start: 2021-12-10 | End: 2022-03-14

## 2021-12-10 NOTE — TELEPHONE ENCOUNTER
Medication:   Requested Prescriptions     Pending Prescriptions Disp Refills    lisinopril-hydroCHLOROthiazide (PRINZIDE;ZESTORETIC) 10-12.5 MG per tablet [Pharmacy Med Name: LISINOPRIL-HCTZ 10-12.5 MG TAB] 90 tablet 0     Sig: TAKE ONE TABLET BY MOUTH DAILY    fenofibrate (TRICOR) 145 MG tablet [Pharmacy Med Name: FENOFIBRATE 145 MG TABLET] 90 tablet 0     Sig: TAKE ONE TABLET BY MOUTH DAILY     Last Filled: 9.9.21 9.9.21    Last appt: 12/7/2021   Next appt: 12/14/2021    Last OARRS: No flowsheet data found.

## 2021-12-14 ENCOUNTER — OFFICE VISIT (OUTPATIENT)
Dept: PRIMARY CARE CLINIC | Age: 51
End: 2021-12-14
Payer: COMMERCIAL

## 2021-12-14 VITALS
RESPIRATION RATE: 16 BRPM | OXYGEN SATURATION: 98 % | DIASTOLIC BLOOD PRESSURE: 84 MMHG | BODY MASS INDEX: 43.47 KG/M2 | WEIGHT: 252 LBS | HEART RATE: 86 BPM | SYSTOLIC BLOOD PRESSURE: 126 MMHG | TEMPERATURE: 97.2 F

## 2021-12-14 DIAGNOSIS — R00.2 PALPITATIONS: Primary | ICD-10-CM

## 2021-12-14 DIAGNOSIS — R73.03 PREDIABETES: ICD-10-CM

## 2021-12-14 DIAGNOSIS — J30.9 ALLERGIC RHINITIS, UNSPECIFIED SEASONALITY, UNSPECIFIED TRIGGER: ICD-10-CM

## 2021-12-14 DIAGNOSIS — Z12.31 ENCOUNTER FOR SCREENING MAMMOGRAM FOR BREAST CANCER: ICD-10-CM

## 2021-12-14 DIAGNOSIS — E78.2 MIXED HYPERLIPIDEMIA: ICD-10-CM

## 2021-12-14 DIAGNOSIS — K21.9 GASTROESOPHAGEAL REFLUX DISEASE, UNSPECIFIED WHETHER ESOPHAGITIS PRESENT: ICD-10-CM

## 2021-12-14 DIAGNOSIS — I10 ESSENTIAL HYPERTENSION: ICD-10-CM

## 2021-12-14 DIAGNOSIS — R19.7 DIARRHEA, UNSPECIFIED TYPE: ICD-10-CM

## 2021-12-14 DIAGNOSIS — F41.9 ANXIETY: ICD-10-CM

## 2021-12-14 PROCEDURE — 99214 OFFICE O/P EST MOD 30 MIN: CPT | Performed by: INTERNAL MEDICINE

## 2021-12-14 RX ORDER — PANTOPRAZOLE SODIUM 40 MG/1
40 TABLET, DELAYED RELEASE ORAL DAILY
COMMUNITY
Start: 2021-12-14 | End: 2022-01-20

## 2021-12-14 NOTE — PROGRESS NOTES
Mackenzie Rivera   Date ofBirth:  1970    Date of Visit:  12/14/2021    Chief Complaint   Patient presents with    Diarrhea       HPI  Patient states she is still having palpitations daily. Patient states it feels like her heart is irregular. Patient denies chest pain and SOB. Patient states she cut back Diet Pepsi a little. Patient has anxiety. Patient takes Sertraline 50mg once daily. Patient states her anxiety has been fine until the palpitations started. Patient is not necessarily more anxious but is concerned about the palpitations.     Patient has diarrhea off and on for 7 months or more. Patient states she was having 6-8 BM's per day. Patient states she now has BM's 3-4 times per day. Patient states she eats and 15 minutes later is in the bathroom. Patient states her stools are loose. Patient states she has had some rectal bleeding. Patient states her magnesium was changed and diarrhea is better. Patient has Hypertension. Patient takes Lisinopril-HCTZ 10-12.5mg po q day. Patient decreases salt. Patient is not exercising but walks 10,000 steps at work per day.     Patient has Hyperlipidemia. Patient takes Fenofibrate 145mg po q day. Patient states she decreases fat and cholesterol somewhat.      Patient has Prediabetes. Patient states she is trying to be better about decreasing carbohydrates.      Patient has GERD. Patient takes Protonix 40mg 2 times per day. Patient denies heartburn and reflux. Patient has decreased caffeine. Patient states she doesn't eat spicy food. Patient states she occasionally eats tomato based food.     Patient has Allergic Rhinitis. Patient takes Flonase nasal spray and generic Claritin as needed. Patient denies symptoms at present. Review of Systems   Constitutional: Negative for chills, fatigue and fever. HENT: Negative for congestion, postnasal drip, rhinorrhea, sinus pressure and sore throat. Eyes: Negative for visual disturbance.    Respiratory: Negative for cough, chest tightness, shortness of breath and wheezing. Cardiovascular: Negative for chest pain, palpitations and leg swelling. Gastrointestinal: Negative for abdominal pain, blood in stool, constipation, diarrhea, nausea and vomiting. Genitourinary: Negative for dysuria, frequency and hematuria. Musculoskeletal: Negative for arthralgias and myalgias. Skin: Negative for rash. Neurological: Negative for dizziness, tremors, syncope, weakness, light-headedness, numbness and headaches. Psychiatric/Behavioral: Negative for dysphoric mood and sleep disturbance. The patient is not nervous/anxious.         Allergies   Allergen Reactions    Sulfa Antibiotics Rash     Outpatient Medications Marked as Taking for the 12/14/21 encounter (Office Visit) with hCinedu Pepper MD   Medication Sig Dispense Refill    pantoprazole (PROTONIX) 40 MG tablet Take 1 tablet by mouth daily      lisinopril-hydroCHLOROthiazide (PRINZIDE;ZESTORETIC) 10-12.5 MG per tablet TAKE ONE TABLET BY MOUTH DAILY 90 tablet 0    fenofibrate (TRICOR) 145 MG tablet TAKE ONE TABLET BY MOUTH DAILY 90 tablet 0    sertraline (ZOLOFT) 50 MG tablet TAKE ONE TABLET BY MOUTH DAILY 30 tablet 0    magnesium (MAGNESIUM-OXIDE) 250 MG TABS tablet Take 1 tablet by mouth 2 times daily 60 tablet 1    Vitamin D, Ergocalciferol, 50 MCG (2000 UT) CAPS 2 caps daily 30 capsule 0    calcitRIOL (ROCALTROL) 0.25 MCG capsule Take 1 capsule by mouth daily 60 capsule 3    [DISCONTINUED] levothyroxine (SYNTHROID) 200 MCG tablet Take 1 tablet by mouth daily 90 tablet 1    Calcium Carbonate Antacid (TUMS ULTRA 1000) 1000 MG CHEW 1 tablet twice daily 60 tablet 0    Acetaminophen (TYLENOL ARTHRITIS PAIN PO) Take by mouth PRN      promethazine (PHENERGAN) 25 MG tablet Take 1 tablet by mouth every 6 hours as needed for Nausea 20 tablet 0    Multiple Vitamins-Minerals (THERAPEUTIC MULTIVITAMIN-MINERALS) tablet Take 1 tablet by mouth daily      fluticasone (FLONASE) 50 MCG/ACT nasal spray 2 sprays by Nasal route daily. 1 Bottle 3         Vitals:    12/14/21 1542   BP: 126/84   Pulse: 86   Resp: 16   Temp: 97.2 °F (36.2 °C)   SpO2: 98%   Weight: 252 lb (114.3 kg)     Body mass index is 43.47 kg/m². Physical Exam  Nursing note reviewed. Constitutional:       General: She is not in acute distress. Appearance: Normal appearance. She is well-developed. Eyes:      General: Lids are normal.      Extraocular Movements: Extraocular movements intact. Conjunctiva/sclera: Conjunctivae normal.      Pupils: Pupils are equal, round, and reactive to light. Neck:      Thyroid: No thyromegaly. Vascular: No carotid bruit. Cardiovascular:      Rate and Rhythm: Normal rate and regular rhythm. Heart sounds: Normal heart sounds, S1 normal and S2 normal. No murmur heard. No friction rub. No gallop. Pulmonary:      Effort: Pulmonary effort is normal.      Breath sounds: No wheezing, rhonchi or rales. Abdominal:      General: Bowel sounds are normal. There is no distension. Palpations: Abdomen is soft. Tenderness: There is no abdominal tenderness. Musculoskeletal:      Cervical back: Neck supple. Right lower leg: No edema. Left lower leg: No edema. Lymphadenopathy:      Head:      Right side of head: No submandibular adenopathy. Left side of head: No submandibular adenopathy. Neurological:      Mental Status: She is alert and oriented to person, place, and time. Psychiatric:         Mood and Affect: Mood normal.           No results found for this visit on 12/14/21.   Lab Review   Orders Only on 12/07/2021   Component Date Value    Magnesium 12/07/2021 1.90     Sodium 12/07/2021 137     Potassium 12/07/2021 3.8     Chloride 12/07/2021 97*    CO2 12/07/2021 28     Anion Gap 12/07/2021 12     Glucose 12/07/2021 95     BUN 12/07/2021 15     CREATININE 12/07/2021 0.8     GFR Non- 12/07/2021 >60     GFR  12/07/2021 >60     Calcium 12/07/2021 8.6     TSH 12/07/2021 1.03     WBC 12/07/2021 5.7     RBC 12/07/2021 4.74     Hemoglobin 12/07/2021 12.1     Hematocrit 12/07/2021 37.9     MCV 12/07/2021 79.9*    MCH 12/07/2021 25.5*    MCHC 12/07/2021 31.9     RDW 12/07/2021 14.7     Platelets 61/86/6967 351     MPV 12/07/2021 7.4     Neutrophils % 12/07/2021 62.6     Lymphocytes % 12/07/2021 24.7     Monocytes % 12/07/2021 8.3     Eosinophils % 12/07/2021 3.1     Basophils % 12/07/2021 1.3     Neutrophils Absolute 12/07/2021 3.5     Lymphocytes Absolute 12/07/2021 1.4     Monocytes Absolute 12/07/2021 0.5     Eosinophils Absolute 12/07/2021 0.2     Basophils Absolute 12/07/2021 0.1    Orders Only on 09/17/2021   Component Date Value    Calcium, Ion 09/17/2021 1.15     Calcium,Ion ph 7.4 09/17/2021 1.12     PTH 09/17/2021 14.1     Phosphorus 09/17/2021 2.7     Magnesium 09/17/2021 1.50*    Vit D, 25-Hydroxy 09/17/2021 37.6     Thyroglobulin by LC-MS/M* 09/17/2021 <0.5*    Anti-Thyroglob Abs 09/17/2021 12     TSH 09/17/2021 0.97     T4 Free 09/17/2021 1.9*    T3, Free 09/17/2021 2.8    Orders Only on 08/06/2021   Component Date Value    Sodium 08/06/2021 141     Potassium 08/06/2021 3.8     Chloride 08/06/2021 101     CO2 08/06/2021 25     Anion Gap 08/06/2021 15     Glucose 08/06/2021 116*    BUN 08/06/2021 16     CREATININE 08/06/2021 0.9     GFR Non- 08/06/2021 >60     GFR  08/06/2021 >60     Calcium 08/06/2021 8.4    Orders Only on 08/02/2021   Component Date Value    Sodium 08/02/2021 139     Potassium 08/02/2021 3.9     Chloride 08/02/2021 99     CO2 08/02/2021 25     Anion Gap 08/02/2021 15     Glucose 08/02/2021 127*    BUN 08/02/2021 13     CREATININE 08/02/2021 1.3*    GFR Non- 08/02/2021 43*    GFR  08/02/2021 52*    Calcium 08/02/2021 8.5     Total Protein 08/02/2021 7. 1     Albumin 08/02/2021 4.4     Albumin/Globulin Ratio 08/02/2021 1.6     Total Bilirubin 08/02/2021 <0.2     Alkaline Phosphatase 08/02/2021 80     ALT 08/02/2021 27     AST 08/02/2021 24     Globulin 08/02/2021 2.7          Assessment/Plan     1. Jessica Cuellar MD, Cardiology, [facility]    2. Diarrhea, unspecified type    - [facility] - Rachelle Gonsalves MD, Gastroenterology, [address] [place] [address]     3. Anxiety    -stable  -Continue same medications    4. Essential hypertension  -stable  -Continue same medications  -Low sodium diet  -Regular aerobic exercise      5. Mixed hyperlipidemia  - Lipid Panel; Future  - Hepatic Function Panel; Future    6. Prediabetes    - Hemoglobin A1C; Future    7. Gastroesophageal reflux disease, unspecified whether esophagitis present    - pantoprazole (PROTONIX) 40 MG tablet; Take 1 tablet by mouth daily    8. Allergic rhinitis, unspecified seasonality, unspecified trigger      9. Encounter for screening mammogram for breast cancer    - FRITZ DIGITAL SCREEN W OR WO CAD BILATERAL; Future      Discussedmedications with patient, who voiced understanding of their use and indications. All questions answered.       Return in about 3 months (around 3/14/2022) for annual physical exam.

## 2021-12-14 NOTE — PATIENT INSTRUCTIONS
-Continue same medications  -Decrease Pantoprazole to once daily  -Over the counter Immodium AD as needed

## 2021-12-18 DIAGNOSIS — K21.9 GASTROESOPHAGEAL REFLUX DISEASE, UNSPECIFIED WHETHER ESOPHAGITIS PRESENT: ICD-10-CM

## 2021-12-20 RX ORDER — PANTOPRAZOLE SODIUM 40 MG/1
TABLET, DELAYED RELEASE ORAL
Qty: 180 TABLET | OUTPATIENT
Start: 2021-12-20

## 2021-12-24 ASSESSMENT — ENCOUNTER SYMPTOMS
COUGH: 0
SINUS PRESSURE: 0
ABDOMINAL PAIN: 0
BLOOD IN STOOL: 0
DIARRHEA: 0
SORE THROAT: 0
VOMITING: 0
SHORTNESS OF BREATH: 0
CHEST TIGHTNESS: 0
RHINORRHEA: 0
WHEEZING: 0
CONSTIPATION: 0
NAUSEA: 0

## 2022-01-03 DIAGNOSIS — M85.80 OSTEOPENIA, UNSPECIFIED LOCATION: ICD-10-CM

## 2022-01-03 DIAGNOSIS — E83.42 HYPOMAGNESEMIA: ICD-10-CM

## 2022-01-03 DIAGNOSIS — C73 THYROID CANCER (HCC): ICD-10-CM

## 2022-01-03 DIAGNOSIS — E21.0 HYPERPARATHYROIDISM, PRIMARY (HCC): ICD-10-CM

## 2022-01-03 DIAGNOSIS — E78.2 MIXED HYPERLIPIDEMIA: ICD-10-CM

## 2022-01-03 DIAGNOSIS — E89.0 HYPOTHYROIDISM, POSTSURGICAL: ICD-10-CM

## 2022-01-03 DIAGNOSIS — E06.3 HASHIMOTO'S THYROIDITIS: ICD-10-CM

## 2022-01-03 DIAGNOSIS — R73.03 PREDIABETES: ICD-10-CM

## 2022-01-03 DIAGNOSIS — E89.2 POSTSURGICAL HYPOPARATHYROIDISM (HCC): ICD-10-CM

## 2022-01-03 LAB
A/G RATIO: 1.7 (ref 1.1–2.2)
ALBUMIN SERPL-MCNC: 4.5 G/DL (ref 3.4–5)
ALBUMIN SERPL-MCNC: 4.6 G/DL (ref 3.4–5)
ALP BLD-CCNC: 71 U/L (ref 40–129)
ALP BLD-CCNC: 71 U/L (ref 40–129)
ALT SERPL-CCNC: 39 U/L (ref 10–40)
ALT SERPL-CCNC: 40 U/L (ref 10–40)
ANION GAP SERPL CALCULATED.3IONS-SCNC: 15 MMOL/L (ref 3–16)
ANTI-THYROGLOB ABS: 14 IU/ML
AST SERPL-CCNC: 33 U/L (ref 15–37)
AST SERPL-CCNC: 33 U/L (ref 15–37)
BILIRUB SERPL-MCNC: 0.3 MG/DL (ref 0–1)
BILIRUB SERPL-MCNC: <0.2 MG/DL (ref 0–1)
BILIRUBIN DIRECT: <0.2 MG/DL (ref 0–0.3)
BILIRUBIN, INDIRECT: NORMAL MG/DL (ref 0–1)
BUN BLDV-MCNC: 13 MG/DL (ref 7–20)
CALCIUM SERPL-MCNC: 8.8 MG/DL (ref 8.3–10.6)
CHLORIDE BLD-SCNC: 101 MMOL/L (ref 99–110)
CHOLESTEROL, TOTAL: 179 MG/DL (ref 0–199)
CO2: 25 MMOL/L (ref 21–32)
CREAT SERPL-MCNC: 0.7 MG/DL (ref 0.6–1.1)
GFR AFRICAN AMERICAN: >60
GFR NON-AFRICAN AMERICAN: >60
GLUCOSE BLD-MCNC: 101 MG/DL (ref 70–99)
HDLC SERPL-MCNC: 26 MG/DL (ref 40–60)
LDL CHOLESTEROL CALCULATED: 95 MG/DL
MAGNESIUM: 1.9 MG/DL (ref 1.8–2.4)
PARATHYROID HORMONE INTACT: 15.5 PG/ML (ref 14–72)
PHOSPHORUS: 3.2 MG/DL (ref 2.5–4.9)
POTASSIUM SERPL-SCNC: 4.4 MMOL/L (ref 3.5–5.1)
SODIUM BLD-SCNC: 141 MMOL/L (ref 136–145)
T3 FREE: 2.7 PG/ML (ref 2.3–4.2)
T4 FREE: 2.1 NG/DL (ref 0.9–1.8)
TOTAL PROTEIN: 7.1 G/DL (ref 6.4–8.2)
TOTAL PROTEIN: 7.2 G/DL (ref 6.4–8.2)
TRIGL SERPL-MCNC: 290 MG/DL (ref 0–150)
TSH SERPL DL<=0.05 MIU/L-ACNC: 1.27 UIU/ML (ref 0.27–4.2)
VITAMIN D 25-HYDROXY: 46.3 NG/ML
VLDLC SERPL CALC-MCNC: 58 MG/DL

## 2022-01-04 LAB
ESTIMATED AVERAGE GLUCOSE: 137 MG/DL
HBA1C MFR BLD: 6.4 %

## 2022-01-05 LAB
CALCIUM IONIZED, CALC AT PH 7.4: 1.16 MMOL/L (ref 1.11–1.3)
CALCIUM IONIZED: 1.15 MMOL/L (ref 1.11–1.3)

## 2022-01-07 LAB — THYROGLOBULIN BY LC-MS/MS, SERUM/PLASMA: <0.5 NG/ML (ref 1.3–31.8)

## 2022-01-11 NOTE — PROGRESS NOTES
[address] [facility]     Outpatient Cardiology         Chief Complaint   Patient presents with    Palpitations       HPI     Mackenzie Davis a 45 y.o. female here for palpations. Referred by PCP. Hx of HTN and HLD. Hypertension is well controlled on Prinzide. Today she comes complaint of palpitations, occasional, sporadic, mild to moderate in nature, feels like beats that come ahead of time followed by thump for strong heartbeat. No particular triggering factors. No associated symptoms. Drinks about 2 Pepsi's a day. No other caffeine intake  Denies symptoms concerning for angina/heart failure. PMH  Past Medical History:   Diagnosis Date    Anxiety     Endometriosis     Hyperlipidemia     Hypertension        PSH  Past Surgical History:   Procedure Laterality Date    ACHILLES TENDON SURGERY Right 2019    RIGHT ACHILLES TENDON RELEASE AND HEEL EXOSTECTOMY performed by Sergey Olivia DPM at [address]  2004     SECTION     [facility]    COLONOSCOPY  2015    Brien GOMEZ    FOOT SURGERY Left 2013    left foot recontstructon    HYSTERECTOMY  2013    complete - vaginal hyst    THYROIDECTOMY      TUBAL LIGATION  2013    with ablation    US THYROID BIOPSY  2020    US THYROID BIOPSY [facility] ULTRASOUND        Social HIstory  Social History     Tobacco Use    Smoking status: Never Smoker    Smokeless tobacco: Never Used   Vaping Use    Vaping Use: Never used   Substance Use Topics    Alcohol use:  Yes     Alcohol/week: 0.8 standard drinks     Types: 1 Standard drinks or equivalent per week     Comment: social    Drug use: No       Family History  Family History   Problem Relation Age of Onset    Diabetes Mother     High Blood Pressure Mother     Stroke Mother     High Blood Pressure Father     Cancer Maternal Aunt 58        breast    Cancer Maternal Grandmother 72        colon cancer    Cancer Paternal Grandmother lung       Allergies   Allergies   Allergen Reactions    Sulfa Antibiotics Rash       Medications:     Home Medications:  Were reviewed and are listed in nursing record. and/or listed below    Prior to Admission medications    Medication Sig Start Date End Date Taking? Authorizing Provider   calcitRIOL (ROCALTROL) 0.25 MCG capsule Take 1 capsule by mouth every other day 1/13/22  Yes Jelani Iqbal MD   levothyroxine (SYNTHROID) 200 MCG tablet TAKE ONE TABLET BY MOUTH DAILY 1/13/22  Yes Jelani Iqbal MD   pantoprazole (PROTONIX) 40 MG tablet Take 1 tablet by mouth daily 12/14/21  Yes Nick Tubbs MD   lisinopril-hydroCHLOROthiazide (PRINZIDE;ZESTORETIC) 10-12.5 MG per tablet TAKE ONE TABLET BY MOUTH DAILY 12/10/21  Yes Nick Tubbs MD   fenofibrate (TRICOR) 145 MG tablet TAKE ONE TABLET BY MOUTH DAILY 12/10/21  Yes Nick Tubbs MD   sertraline (ZOLOFT) 50 MG tablet TAKE ONE TABLET BY MOUTH DAILY 12/6/21  Yes Nick Tubbs MD   magnesium (MAGNESIUM-OXIDE) 250 MG TABS tablet Take 1 tablet by mouth 2 times daily 9/27/21  Yes Jelani Iqbal MD   Vitamin D, Ergocalciferol, 50 MCG (2000 UT) CAPS 2 caps daily 9/27/21  Yes Jelani Iqbal MD   Calcium Carbonate Antacid (TUMS ULTRA 1000) 1000 MG CHEW 1 tablet twice daily 12/8/20  Yes Jelani Iqbal MD   Acetaminophen (TYLENOL ARTHRITIS PAIN PO) Take by mouth PRN   Yes Historical Provider, MD   Multiple Vitamins-Minerals (THERAPEUTIC MULTIVITAMIN-MINERALS) tablet Take 1 tablet by mouth daily   Yes Historical Provider, MD   fluticasone (FLONASE) 50 MCG/ACT nasal spray 2 sprays by Nasal route daily. 1/6/15  Yes Macario Cons, DO   promethazine (PHENERGAN) 25 MG tablet Take 1 tablet by mouth every 6 hours as needed for Nausea  Patient not taking: Reported on 1/18/2022 6/5/20   Nick Tubbs MD        Review of Systems   Constitutional: Negative for activity change, appetite change, diaphoresis, fatigue, fever and unexpected weight change.    HENT: Negative for congestion, facial swelling, mouth sores and nosebleeds. Eyes: Negative for discharge and visual disturbance. Respiratory: Negative for cough, chest tightness, shortness of breath and wheezing. Cardiovascular: Positive for palpitations. Negative for chest pain and leg swelling. Gastrointestinal: Negative for abdominal distention, abdominal pain, blood in stool and vomiting. Endocrine: Negative for cold intolerance, heat intolerance and polyuria. Genitourinary: Negative for difficulty urinating, dysuria, frequency and hematuria. Musculoskeletal: Negative for back pain, joint swelling, myalgias and neck pain. Skin: Negative for color change, pallor and rash. Allergic/Immunologic: Negative for immunocompromised state. Neurological: Negative for dizziness, syncope, weakness, light-headedness, numbness and headaches. Hematological: Negative for adenopathy. Does not bruise/bleed easily. Psychiatric/Behavioral: Negative for behavioral problems, confusion, decreased concentration and suicidal ideas. The patient is not nervous/anxious. Vitals:    01/18/22 1511   BP: 130/80   Pulse: 86    Weight: 254 lb 12.8 oz (115.6 kg)       Vitals:    01/18/22 1511   BP: 130/80   Site: Left Upper Arm   Position: Sitting   Cuff Size: Large Adult   Pulse: 86   Weight: 254 lb 12.8 oz (115.6 kg)   Height: 5' 2\" (1.575 m)       BP Readings from Last 3 Encounters:   01/18/22 130/80   01/13/22 127/80   12/14/21 126/84       Wt Readings from Last 3 Encounters:   01/18/22 254 lb 12.8 oz (115.6 kg)   01/13/22 254 lb (115.2 kg)   12/14/21 252 lb (114.3 kg)       Physical Exam  Constitutional:       General: She is not in acute distress. Appearance: She is well-developed. She is not diaphoretic. HENT:      Head: Normocephalic and atraumatic. Eyes:      Pupils: Pupils are equal, round, and reactive to light. Neck:      Thyroid: No thyromegaly. Vascular: No JVD.    Cardiovascular:      Rate and Rhythm: Normal rate and regular rhythm. Chest Wall: PMI is not displaced. Heart sounds: Normal heart sounds, S1 normal and S2 normal. No murmur heard. No friction rub. No gallop. Pulmonary:      Effort: Pulmonary effort is normal. No respiratory distress. Breath sounds: Normal breath sounds. No stridor. No wheezing or rales. Chest:      Chest wall: No tenderness. Abdominal:      General: Bowel sounds are normal. There is no distension. Palpations: Abdomen is soft. Tenderness: There is no abdominal tenderness. There is no guarding or rebound. Musculoskeletal:         General: No tenderness. Normal range of motion. Cervical back: Normal range of motion. Lymphadenopathy:      Cervical: No cervical adenopathy. Skin:     General: Skin is warm and dry. Findings: No erythema or rash. Neurological:      Mental Status: She is alert and oriented to person, place, and time. Coordination: Coordination normal.   Psychiatric:         Behavior: Behavior normal.         Thought Content:  Thought content normal.         Judgment: Judgment normal.         Labs:       Lab Results   Component Value Date    WBC 5.7 12/07/2021    HGB 12.1 12/07/2021    HCT 37.9 12/07/2021    MCV 79.9 (L) 12/07/2021     12/07/2021     Lab Results   Component Value Date     01/03/2022    K 4.4 01/03/2022     01/03/2022    CO2 25 01/03/2022    BUN 13 01/03/2022    CREATININE 0.7 01/03/2022    GLUCOSE 101 (H) 01/03/2022    CALCIUM 8.8 01/03/2022    PROT 7.2 01/03/2022    LABALBU 4.5 01/03/2022    BILITOT 0.3 01/03/2022    ALKPHOS 71 01/03/2022    AST 33 01/03/2022    ALT 39 01/03/2022    LABGLOM >60 01/03/2022    GFRAA >60 01/03/2022    AGRATIO 1.7 01/03/2022    GLOB 2.7 08/02/2021         Lab Results   Component Value Date    CHOL 179 01/03/2022    CHOL 130 03/12/2021    CHOL 165 06/03/2020     Lab Results   Component Value Date    TRIG 290 (H) 01/03/2022    TRIG 152 (H) 03/12/2021    TRIG 221 (H) 06/03/2020     Lab Results   Component Value Date    HDL 26 (L) 01/03/2022    HDL 24 (L) 03/12/2021    HDL 27 (L) 06/03/2020     Lab Results   Component Value Date    LDLCALC 95 01/03/2022    LDLCALC 76 03/12/2021    LDLCALC 94 06/03/2020     Lab Results   Component Value Date    LABVLDL 58 01/03/2022    LABVLDL 30 03/12/2021    LABVLDL 44 06/03/2020     No results found for: CHOLHDLRATIO    No results found for: INR, PROTIME    The 10-year ASCVD risk score (Benigno Fay, et al., 2013) is: 4.2%    Values used to calculate the score:      Age: 45 years      Sex: Female      Is Non- : No      Diabetic: No      Tobacco smoker: No      Systolic Blood Pressure: 422 mmHg      Is BP treated: Yes      HDL Cholesterol: 26 mg/dL      Total Cholesterol: 179 mg/dL      Imaging:       Last ECG (if available, Personally interpreted):  Normal sinus. No ischemic changes    Last Monitor/Holter  This 24 hour test was scanned by León Barros 2/15/2018 13:05. Tech comments:   1. The rhythm was sinus. Average AK interval 0.18 average QRS   duration 0. 11. Average daily heart rate 82 ranging from 53 to 123 bpm.   2. premature supraventricular ectopic beats total 2 consisting   of an atrial pair. 3. There was one premature ventricular ectopic beat [PVC]. 4. Diary returned with entries of \"palpitation\" with an atrial pair   and PVC noted. Confirmed by [facility] Rigo FRAGOSO MD ([id number]) on 2/15/2018 3:41:07 PM     Last Stress (if available):    Last Cath (if available):    Last TTE/GLADYS(if available): 2/13/18  Summary   Normal left ventricle size, wall thickness and systolic function with an   estimated ejection fraction of 55%. No regional wall motion abnormalities   are seen. Mild mitral regurgitation is present. There is mild-moderate tricuspid regurgitation with RVSP estimated at 39   mmHg.     Last CMR  (if available):      Assessment / Plan:     Palpitations  Likely symptomatic PVCs. Not very frequent and not very symptomatic. Continue with conservative management. If symptoms continue or frequency increases plan for cardiac monitor. Previous cardiac work-up within normal limits including normal echocardiogram and Holter monitor showing occasional PACs      I had the opportunity to review the clinical symptoms and presentation of Mackenzie Almanza. Patient's allergies and medications were reviewed and updated. Patient's past medical, surgical, social and family history were reviewed and updated. Patient's testing including laboratory, ECGs, monitor, imaging (TTE,GLADYS,CMR,cath) were reviewed. Tobacco use was discussed with the patient and educated on the negative effects. I have asked the patient to not utilize these agents. All questions and concerns were addressed to the patient/family. Alternatives to my treatment were discussed. The note was completed using EMR. Every effort wasmade to ensure accuracy; however, inadvertent computerized transcription errors may be present. Thank you for allowing me to participate in thecare or [address].  [address] Trejo MD, [facility] [place], [facility]

## 2022-01-13 ENCOUNTER — OFFICE VISIT (OUTPATIENT)
Dept: ENDOCRINOLOGY | Age: 52
End: 2022-01-13
Payer: COMMERCIAL

## 2022-01-13 VITALS
OXYGEN SATURATION: 98 % | SYSTOLIC BLOOD PRESSURE: 127 MMHG | WEIGHT: 254 LBS | BODY MASS INDEX: 43.82 KG/M2 | DIASTOLIC BLOOD PRESSURE: 80 MMHG | HEART RATE: 83 BPM

## 2022-01-13 DIAGNOSIS — E21.0 HYPERPARATHYROIDISM, PRIMARY (HCC): Primary | ICD-10-CM

## 2022-01-13 DIAGNOSIS — E66.01 CLASS 3 SEVERE OBESITY WITH BODY MASS INDEX (BMI) OF 40.0 TO 44.9 IN ADULT, UNSPECIFIED OBESITY TYPE, UNSPECIFIED WHETHER SERIOUS COMORBIDITY PRESENT (HCC): ICD-10-CM

## 2022-01-13 DIAGNOSIS — R73.03 PREDIABETES: ICD-10-CM

## 2022-01-13 DIAGNOSIS — E89.0 HYPOTHYROIDISM, POSTSURGICAL: ICD-10-CM

## 2022-01-13 DIAGNOSIS — E06.3 HASHIMOTO'S THYROIDITIS: ICD-10-CM

## 2022-01-13 DIAGNOSIS — E78.2 MIXED HYPERLIPIDEMIA: ICD-10-CM

## 2022-01-13 DIAGNOSIS — E28.319 PREMATURE MENOPAUSE: ICD-10-CM

## 2022-01-13 DIAGNOSIS — E89.2 POSTSURGICAL HYPOPARATHYROIDISM (HCC): ICD-10-CM

## 2022-01-13 DIAGNOSIS — E83.42 HYPOMAGNESEMIA: ICD-10-CM

## 2022-01-13 DIAGNOSIS — M85.80 OSTEOPENIA, UNSPECIFIED LOCATION: ICD-10-CM

## 2022-01-13 DIAGNOSIS — C73 THYROID CANCER (HCC): ICD-10-CM

## 2022-01-13 PROCEDURE — 99215 OFFICE O/P EST HI 40 MIN: CPT | Performed by: INTERNAL MEDICINE

## 2022-01-13 RX ORDER — CALCITRIOL 0.25 UG/1
0.25 CAPSULE, LIQUID FILLED ORAL EVERY OTHER DAY
Qty: 90 CAPSULE | Refills: 0
Start: 2022-01-13 | End: 2022-07-28

## 2022-01-13 RX ORDER — LEVOTHYROXINE SODIUM 0.2 MG/1
TABLET ORAL
Qty: 90 TABLET | Refills: 1 | Status: SHIPPED | OUTPATIENT
Start: 2022-01-13 | End: 2022-08-02

## 2022-01-13 NOTE — PROGRESS NOTES
SUBJECTIVE:  Sarah Degroot is a 45 y.o. female who is being evaluated for hyperparathyroidism. 1. Hyperparathyroidism, primary   This started in 2020. Patient was diagnosed with hypercalcemia. The problem has been gradually worsening. Patient started medication in 2020. Currently patient is on: N/A. Misses  N/A doses a month. Current complaints: fatigue, easy gets hot  No history of kidney stones  No persistent constipation, aches, depression  Subtotal parathyroidectomy 10/16/2020, right superior, left superior, left inferior    2. Thyroid cancer  EXAM: NM THYROID CARCINOMA ABLATION        INDICATION: Malignant neoplasm of thyroid gland (CMS Dx) 78-year-old female with a history of    papillary thyroid cancer status post thyroidectomy here for postthyroidectomy radioablation.        RADIOPHARMACEUTICAL:  I-131 capsule 52.1 mCi PO            Thyroid, total thyroidectomy:  -  Multifocal papillary thyroid carcinoma, classic type, involving the left  lobe (1.1 x 0.8 x 0.7 cm) and right lobe (3 mm). -  Negative for extrathyroidal extension.  -  Background thyroid with chronic lymphocytic thyroiditis and nodular  thyroid hyperplasia with adenomatoid nodules. -  Surgical resection margins are free of malignancy. History of obstructive symptoms: difficulty swallowing No, changes in voice/hoarseness No.  History of radiation to patient's neck: No  Resent iodine exposure: No  Family history includes no thyroid abnormalities. Family history of thyroid cancer: No    3. Premature menopause  Menopause since age 38  Was on Estrogen    4. Obesity  He is moderately healthy, active    5. Hashimoto's thyroiditis  Has fatigue    6. Osteopenia  Calcium in diet  No bone pain    7. Postsurgical hypoparathyroidism  Has numbness, tingling in hands. Not in face anymore, had after surgery    8. Hypomagnesia  Has fatigue    9. Prediabetes  Mother had sugar problem    10. Postsurgical hypothyroidism  Has fatigue    11. Hyperlipidemia  No muscle pain    EXAM: NM THYROID CARCINOMA ABLATION        INDICATION: Malignant neoplasm of thyroid gland (CMS Dx) 58-year-old female with a history of    papillary thyroid cancer status post thyroidectomy here for postthyroidectomy radioablation.        RADIOPHARMACEUTICAL:  I-131 capsule 52.1 mCi PO            52.1   IMPRESSION:       Written home instructions, including radiation safety precautions, were provided and reviewed. Informed, written consent was obtained. All of her questions were answered to her satisfaction.   Andre Dozier was treated with radioiodine-131 by mouth. Jack Santos will return next week for a    post-treatment scan.            Approved by Darlyn Irizarry MD on 1/29/2021 2:30 PM EST       I have personally reviewed the images and I agree with this report.       Report Verified by: Nicki Lindsey MD at 1/29/2021 3:12 PM EST   52.1           10 year risk for major osteoporotic fracture is 3.8% and risk of a hip    fracture is 0.2%. EXAM: NM THYROID METASTASES WHOLE BODY       INDICATION: This patient with thyroid cancer is referred for imaging following an ablative dose    of iodine-131.       TECHNICAL: Whole body thyroid carcinoma metastasis images with special attention to the neck    and chest were obtained 5 days following the administration of an ablative dose of iodine-131.       COMPARISON: Pretreatment study of 1/29/2021.       FINDINGS:    Whole body images again demonstrate a focus of uptake in the anterior neck. No additional areas    of radioiodine accumulation are identified outside of the neck.         IMPRESSION:   1.  Radioactive iodine uptake in the anterior neck.  No abnormal uptake outside the neck.       Report Verified by: Nicki Lindsey MD at 2/3/2021 12:04 PM EST                 EXAMINATION:    BONE DENSITOMETRY         7/31/2020 8:26 am         TECHNIQUE:    A bone density DEXA scan was performed of the lumbar spine, bilateral hips    and left forearm on a [place] system.         COMPARISON:    None.         HISTORY:    ORDERING SYSTEM PROVIDED HISTORY: Hyperparathyroidism, primary [facility])    TECHNOLOGIST PROVIDED HISTORY:    Reason for exam:->Hyperparathyroidism. Please include non-dominant forearm. Is the patient pregnant?->No         FINDINGS:    LEFT FOREARM:         The BMD of the middle third of the radius of the distal forearm equals 0.64    g/cm2.  The T- and Z-scores are -0.9 and -0.2 standard deviations from the    mean.  This is within the normal range by WHO criteria.         LUMBAR SPINE:         The bone mineral density in the lumbar spine including the L1-L4 levels is    measured at 0.89 g/cm2, which corresponds to a T-score of -1.4 and a Z-score    of -0.6.  This is within the osteopenia range by WHO criteria.         LEFT HIP:         The bone mineral density in the total hip is measured at 0.92 g/cm2    corresponding to a T-score of -0.2 and a Z-score of 0.3.  This is within the    normal range by WHO criteria.         The bone mineral density of the femoral neck is measured at 0.75 g/cm2    corresponding to a T-score of -0.9 and a Z-score of -0.1.  This is within the    normal range by WHO criteria.         RIGHT HIP:         The bone mineral density in the total hip is measured at 0.90 g/cm2    corresponding to a T-score of -0.3 and a Z-score of 0.2.  This is within the    normal range by WHO criteria.         The bone mineral density of the femoral neck is measured at 0.71 g/cm2    corresponding to a T-score of -1.2 and a Z-score of -0.5.  This is within the    osteopenia range by WHO criteria.         10 year risk for major osteoporotic fracture is 3.8% and risk of a hip    fracture is 0.2%.              Impression    Osteopenia by WHO criteria.                 Past Medical History:   Diagnosis Date    Anxiety     Endometriosis     Hyperlipidemia     Hypertension      Patient Active Problem List    Diagnosis Date Noted    Palpitations 12/07/2021    Class 3 severe obesity with body mass index (BMI) of 45.0 to 49.9 in adult [facility]) 09/26/2021    Prediabetes 03/09/2021    Mixed hyperlipidemia 03/09/2021    Snoring 03/09/2021    Hypothyroidism, postsurgical 12/08/2020    Postsurgical hypoparathyroidism ([facility] 12/08/2020    Hypomagnesemia 12/08/2020    [facility] 12/08/2020    Hashimoto's thyroiditis 08/06/2020    Osteopenia 08/01/2020    Class 3 severe obesity with body mass index (BMI) of 40.0 to 44.9 in adult [facility]) 07/07/2020    Hyperparathyroidism, primary ([facility] 07/07/2020    Premature menopause 07/07/2020    Epigastric abdominal pain 06/12/2020    Nausea 06/12/2020    Low back pain without sciatica 06/12/2020    Diarrhea 06/12/2020    Allergic rhinitis 03/20/2020    Pain of right middle finger 03/20/2020    Anxiety 05/10/2018    Gastroesophageal reflux disease 10/30/2016    Surgical menopause on hormone replacement therapy 12/17/2015    Hypertriglyceridemia 12/17/2015    HTN (hypertension) 07/01/2015    Colon polyp 03/18/2015     Past Surgical History:   Procedure Laterality Date    ACHILLES TENDON SURGERY Right 6/24/2019    RIGHT ACHILLES TENDON RELEASE AND HEEL EXOSTECTOMY performed by Chris Andrade DPM at [address]  2004   [address] COLONOSCOPY  03/18/2015    Brien GOMEZ    FOOT SURGERY Left 2013    left foot recontstructon    HYSTERECTOMY  2013    complete - vaginal hyst    THYROIDECTOMY      TUBAL LIGATION  2013    with ablation    US THYROID BIOPSY  7/31/2020    US THYROID BIOPSY [facility] ULTRASOUND     Family History   Problem Relation Age of Onset    Diabetes Mother     High Blood Pressure Mother     Stroke Mother     High Blood Pressure Father     Cancer Maternal Aunt 58        breast    Cancer Maternal Grandmother 72        colon cancer    Cancer Paternal Grandmother         lung     Social History     Socioeconomic History    Marital status:      Spouse name: None    Number of children: None    Years of education: None    Highest education level: None   Occupational History    None   Tobacco Use    Smoking status: Never Smoker    Smokeless tobacco: Never Used   Vaping Use    Vaping Use: Never used   Substance and Sexual Activity    Alcohol use: Yes     Alcohol/week: 0.8 standard drinks     Types: 1 Standard drinks or equivalent per week     Comment: social    Drug use: No    Sexual activity: Yes     Partners: Male   Other Topics Concern    None   Social History Narrative    None     Social Determinants of Health     Financial Resource Strain: Low Risk     Difficulty of Paying Living Expenses: Not hard at all   Food Insecurity: No Food Insecurity    Worried About Running Out of Food in the Last Year: Never true    Maria Gu[place]upe of Food in the Last Year: Never true   Transportation Needs:     Lack of Transportation (Medical): Not on file    Lack of Transportation (Non-Medical):  Not on file   Physical Activity:     Days of Exercise per Week: Not on file    Minutes of Exercise per Session: Not on file   Stress:     Feeling of Stress : Not on file   Social Connections:     Frequency of Communication with Friends and Family: Not on file    Frequency of Social Gatherings with Friends and Family: Not on file    Attends Hinduism Services: Not on file    Active Member of [address] [place] [address] or Organizations: Not on file    Attends Club or Organization Meetings: Not on file    Marital Status: Not on file   Intimate Partner Violence:     Fear of Current or Ex-Partner: Not on file    Emotionally Abused: Not on file    Physically Abused: Not on file    Sexually Abused: Not on file   Housing Stability:     Unable to Pay for Housing in the Last Year: Not on file    Number of [place] in the Last Year: Not on file    Unstable Housing in the Last Year: Not on file     Current Outpatient Medications   Medication Sig Dispense Refill  calcitRIOL (ROCALTROL) 0.25 MCG capsule TAKE ONE CAPSULE BY MOUTH TWICE A DAY (Patient taking differently: daily Once daily) 60 capsule 0    levothyroxine (SYNTHROID) 200 MCG tablet TAKE ONE TABLET BY MOUTH DAILY 60 tablet 0    pantoprazole (PROTONIX) 40 MG tablet Take 1 tablet by mouth daily      lisinopril-hydroCHLOROthiazide (PRINZIDE;ZESTORETIC) 10-12.5 MG per tablet TAKE ONE TABLET BY MOUTH DAILY 90 tablet 0    fenofibrate (TRICOR) 145 MG tablet TAKE ONE TABLET BY MOUTH DAILY 90 tablet 0    sertraline (ZOLOFT) 50 MG tablet TAKE ONE TABLET BY MOUTH DAILY 30 tablet 0    magnesium (MAGNESIUM-OXIDE) 250 MG TABS tablet Take 1 tablet by mouth 2 times daily 60 tablet 1    Vitamin D, Ergocalciferol, 50 MCG (2000 UT) CAPS 2 caps daily 30 capsule 0    Calcium Carbonate Antacid (TUMS ULTRA 1000) 1000 MG CHEW 1 tablet twice daily 60 tablet 0    Acetaminophen (TYLENOL ARTHRITIS PAIN PO) Take by mouth PRN      promethazine (PHENERGAN) 25 MG tablet Take 1 tablet by mouth every 6 hours as needed for Nausea 20 tablet 0    Multiple Vitamins-Minerals (THERAPEUTIC MULTIVITAMIN-MINERALS) tablet Take 1 tablet by mouth daily      fluticasone (FLONASE) 50 MCG/ACT nasal spray 2 sprays by Nasal route daily. 1 Bottle 3     No current facility-administered medications for this visit.      Allergies   Allergen Reactions    Sulfa Antibiotics Rash     Family Status   Relation Name Status    Mother  Alive    Father  Alive    [address]  (Not Specified)    MGM  (Not Specified)    Sister  Alive    Brother  Alive    PGM  (Not Specified)   Russell Torres Sister   at age 32        Down Syndrome       Review of Systems:  Constitutional: has fatigue, no fever, no recent weight gain, no recent weight loss, no changes in appetite  Eyes: no eye pain, no change in vision, no eye redness, no eye irritation, no double vision  Ears, nose, throat: no nasal congestion, no sore throat, no earache, no decrease in hearing, no hoarseness, no dry mouth, no sinus problems, no difficulty swallowing, no neck lumps, no dental problems, no mouth sores, no ringing in ears  Pulmonary: no shortness of breath, no wheezing, no dyspnea on exertion, no cough  Cardiovascular: no chest pain, no lower extremity edema, no orthopnea, no intermittent leg claudication, no palpitations  Gastrointestinal: has abdominal pain, no nausea, no vomiting, has diarrhea, has constipation, no dysphagia, has heartburn, no bloating  Genitourinary: no dysuria, no urinary incontinence, no urinary hesitancy, no urinary frequency, no feelings of urinary urgency, no nocturia  Musculoskeletal: no joint swelling, no joint stiffness, has joint pain, has muscle cramps, no muscle pain, no bone pain  Integument/Breast: no hair loss, no skin rashes, no skin lesions, no itching, no dry skin  Neurological: no numbness, no tingling, no weakness, no confusion, no headaches, no dizziness, no fainting, no tremors, no decrease in memory, no balance problems  Psychiatric: no anxiety, no depression, no insomnia  Hematologic/Lymphatic: no tendency for easy bleeding, no swollen lymph nodes, no tendency for easy bruising  Immunology: has seasonal allergies, no frequent infections, no frequent illnesses  Endocrine: has temperature intolerance    /80   Pulse 83   Wt 254 lb (115.2 kg)   SpO2 98%   BMI 43.82 kg/m²    Wt Readings from Last 3 Encounters:   01/13/22 254 lb (115.2 kg)   12/14/21 252 lb (114.3 kg)   12/07/21 250 lb 9.6 oz (113.7 kg)     Body mass index is 43.82 kg/m².     OBJECTIVE:  Constitutional: no acute distress, well appearing and well nourished  Psychiatric: oriented to person, place and time, judgement and insight and normal, recent and remote memory and intact and mood and affect are normal  Skin: skin and subcutaneous tissue is normal without mass, normal turgor  Head and Face: examination of head and face revealed no abnormalities  Eyes: no lid or conjunctival swelling, erythema or discharge, pupils are normal, equal, round, reactive to light  Ears/Nose: external inspection of ears and nose revealed no abnormalities, hearing is grossly normal  Oropharynx/Mouth/Face: lips, tongue and gums are normal with no lesions, the voice quality was normal  Neck: neck is supple and symmetric, with midline trachea and no masses, thyroid is absent, no palpable masses  Lymphatics: normal cervical lymph nodes, normal supraclavicular nodes  Pulmonary: no increased work of breathing or signs of respiratory distress, lungs are clear to auscultation  Cardiovascular: normal heart rate and rhythm, normal S1 and S2, no murmurs and pedal pulses and 2+ bilaterally, No edema  Abdomen: abdomen is soft, non-tender with no masses  Musculoskeletal: normal gait and station and exam of the digits and nails are normal  Neurological: normal coordination and normal general cortical function      Lab Review:    Lab Results   Component Value Date    WBC 5.7 12/07/2021    HGB 12.1 12/07/2021    HCT 37.9 12/07/2021    MCV 79.9 12/07/2021     12/07/2021     Lab Results   Component Value Date     01/03/2022    K 4.4 01/03/2022     01/03/2022    CO2 25 01/03/2022    BUN 13 01/03/2022    CREATININE 0.7 01/03/2022    GLUCOSE 101 01/03/2022    CALCIUM 8.8 01/03/2022    PROT 7.2 01/03/2022    LABALBU 4.5 01/03/2022    BILITOT 0.3 01/03/2022    ALKPHOS 71 01/03/2022    AST 33 01/03/2022    ALT 39 01/03/2022    LABGLOM >60 01/03/2022    GFRAA >60 01/03/2022    AGRATIO 1.7 01/03/2022    GLOB 2.7 08/02/2021     Lab Results   Component Value Date    TSH 1.27 01/03/2022    FT3 2.7 01/03/2022     Lab Results   Component Value Date    LABA1C 6.4 01/03/2022     Lab Results   Component Value Date    .0 01/03/2022     Lab Results   Component Value Date    CHOL 179 01/03/2022     Lab Results   Component Value Date    TRIG 290 01/03/2022     Lab Results   Component Value Date    HDL 26 01/03/2022     Lab Results   Component Value Date    LDLCALC 95 01/03/2022     Lab Results   Component Value Date    LABVLDL 58 01/03/2022     No results found for: [facility]  Lab Results   Component Value Date    LABMICR YES 06/05/2020     Lab Results   Component Value Date    VITD25 46.3 01/03/2022        ASSESSMENT/PLAN:  1. Hyperparathyroidism, primary   Calcium - Tums 1000 mg 0-2 a day, not every day  Vitamin D 2000 IU daily  Magnesium 250 mg daily  Calcium 8.7-8.6-9.0-8.5-8.4-8.8  Albumin 4.5-4.4  PTH 8.5-11.4-14.1-15.5  Magnesium 1.7-1.7-1.7-1.5  25 hydroxy vitamin D 41.5-47.0-44.2-37.6-46.3  TSH 2.1  Subtotal parathyroidectomy-right superior, left superior, left inferior 10/16/2020   Total thyroidectomy    - Vitamin D 25 Hydroxy; Future  - Calcium Ionized Serum; Future  - PTH, Intact; Future    2. Thyroid cancer  Thyroglobulin less than 0.5-<0.1-<0.5-<0.5  Thyroglobulin antibody 13    1/2021  RADIOPHARMACEUTICAL:  I-131 capsule 52.1 mCi PO      Total thyroidectomy 10/16/2020  [id number]  Pre-Operative Diagnosis: Multinodular goiter, hypercalcemia  Post-Operative Diagnosis:     Same  Specimen(s) Submitted:   A. Rt. superior parathyroid; B. Lt. superior  parathyroid; C. Total thyroid; D. Lt. inferior parathyroid  A.  Right superior parathyroid, parathyroidectomy:  -  Hypercellular parathyroid gland (35 mg). B.  Left superior parathryoid, parathyroidectomy:  -  Enlarged, hypercellular parathyroid (190 mg). C.  Thyroid, total thyroidectomy:  -  Multifocal papillary thyroid carcinoma, classic type, involving the left  lobe (1.1 x 0.8 x 0.7 cm) and right lobe (3 mm). -  Negative for extrathyroidal extension.  -  Background thyroid with chronic lymphocytic thyroiditis and nodular  thyroid hyperplasia with adenomatoid nodules. -  Surgical resection margins are free of malignancy. Michelle Mckeon synoptic report for details. D.  Left inferior parathyroid, parathyroidectomy:  -  Benign parathyroid tissue (20 mg).   SYNOPTIC REPORT:  Report Name: Thyroid Gland - Resection,  Version: [de-identified],   Inst#: 1  Specimen    Procedure: Total thyroidectomy  Tumor    Tumor Focality: Multifocal    Tumor Characteristics      Tumor Site: Left lobe      Histologic Type: Papillary carcinoma, classic (usual,                       conventional)      Tumor Size: 1.1 x 0.8 x 0.7 Centimeters (cm)      Extrathyroidal Extension: Not identified      Angioinvasion (vascular invasion): Not identified      Lymphatic Invasion: Not identified      Margins: Uninvolved by carcinoma  Lymph Nodes    Regional Lymph Nodes: No lymph nodes submitted or found  Pathologic Stage Classification (pTNM, AJCC 8th Edition)    TNM Descriptors: m (multiple primary tumors)    Primary Tumor (pT): pT1b    Regional Lymph Nodes (pN): pNX  Comments      Second small focus of papillary thyroid carcinoma is present in the  right lobe measuring 3 mm. - US Head Neck Soft Tissue Thyroid; Future  -Thyroglobulin  -Thyroglobulin antibody  EXAM:  NM THYROID METASTASES LIMITED   EXAM: NM THYROID METAS UPTAKE ADDL       INDICATION: Malignant neoplasm of thyroid gland (CMS Dx); This patient is referred for the    evaluation of residual thyroid tissue post thyroidectomy.       RADIOPHARMACEUTICAL:  Recombinant TSH 0.9 mg IM x 2, I-123 capsule 335 uCi PO       TECHNICAL:  A limited thyroid cancer workup was performed using the recombinant TSH protocol.     Recombinant TSH was administered prior to the administration of iodine-123.  Imaging of the    neck and chest was performed 24 hours following radioiodine-123 administration. A quantitative    uptake was also performed at the level of the neck.            COMPARISON:  None           FINDINGS:        Images demonstrate a focal area of uptake in the anterior neck.        The 24 hour radioiodine uptake at the level of the neck is 0.8%.           3. Obesity  Diet and exercise    4. Premature menopause  Calcium and vitamin D supplementation  DEXA scan    5.   Hashimoto's thyroiditis  TSH 2.34    6. Osteopenia  Calcium, vitamin D supplementation was discussed, written instructions were given how to calculate calcium in diet  Calcium, vitamin D  Counseled patient on risk fracture  10 year risk for major osteoporotic fracture is 3.8% and risk of a hip    fracture is 0.2%. 7.  Postsurgical hypothyroidism  Has more palpitations  Continue levothyroxine 0.200 mg daily  Total thyroidectomy 10/16/2020  TSH 2.1-2.4-2.34-6.84-0.97-1.27  -TSH  -Free T4  -Free T3    8. Postsurgical hypoparathyroidism  1000 mg Tums bid once in a while  Calcitriol 0.25 mcg qd now- decrease to 1 tablet every other day and do CMP in 2 weeks  Vitamin D3 4000 IU   PTH 5.9-11.4  -CMP  -PTH  -Ionized calcium  -Phosphorus  -Magnesium  -25 hydroxy vitamin D    9. Hypomagnesia  Uncontrolled  Magnesium 250 mg only taking one because of diarrhea  Magnesium 1.8-1.7-1.5  -Magnesium    10. Prediabetes  Glucose 114-104-116  HbA1C 6.2    11.   Hyperlipidemia  LDL 95  Triglycerides 290  HDL 26        Reviewed and/or ordered clinical lab results Yes  Reviewed and/or ordered radiology tests Yes   Reviewed and/or ordered other diagnostic tests No  Discussed test results with performing physician No  Independently reviewed image, tracing, or specimen No  Made a decision to obtain old records No  Reviewed and summarized old records Yes   Calcium elevated since 2015, later upper limit of normal, in 2020 elevated again  PTH 38  Calcium 11.1-10.5-10.2-10.1-10.8-11.3, upper limit normal 10.6  TSH 1.4  RADIOPHARMACEUTICAL:  I-131 capsule 52.1 mCi PO            Obtained history from other than patient No    Mackenzie Degroot Else was counseled regarding symptoms of primary hyperparathyroidism, thyroid cancer, postsurgical hypothyroidism, postsurgical hypoparathyroidism, hypomagnesemia, premature menopause diagnosis, course and complications of disease if inadequately treated, side effects of medications, diagnosis, treatment options, and prognosis, risks, benefits, complications, and alternatives of treatment, labs, imaging and other studies and treatment targets and goals, thyroid cancer management, surveillance, recurrence prevention, hypoparathyroidism management, follow-up and monitoring, medication adjustments    She understands instructions and counseling. Total time I spent for this encounter 40 minutes    Return in about 3 months (around 4/13/2022) for parathyroid.     Electronically signed by Kevin Alanis MD on 1/13/2022 at 4:11 PM

## 2022-01-18 ENCOUNTER — OFFICE VISIT (OUTPATIENT)
Dept: CARDIOLOGY CLINIC | Age: 52
End: 2022-01-18
Payer: COMMERCIAL

## 2022-01-18 VITALS
HEIGHT: 62 IN | DIASTOLIC BLOOD PRESSURE: 80 MMHG | HEART RATE: 86 BPM | WEIGHT: 254.8 LBS | SYSTOLIC BLOOD PRESSURE: 130 MMHG | BODY MASS INDEX: 46.89 KG/M2

## 2022-01-18 DIAGNOSIS — R00.2 PALPITATIONS: Primary | ICD-10-CM

## 2022-01-18 PROCEDURE — 93000 ELECTROCARDIOGRAM COMPLETE: CPT | Performed by: INTERNAL MEDICINE

## 2022-01-18 PROCEDURE — 99204 OFFICE O/P NEW MOD 45 MIN: CPT | Performed by: INTERNAL MEDICINE

## 2022-01-18 ASSESSMENT — ENCOUNTER SYMPTOMS
WHEEZING: 0
FACIAL SWELLING: 0
BLOOD IN STOOL: 0
COUGH: 0
ABDOMINAL PAIN: 0
SHORTNESS OF BREATH: 0
BACK PAIN: 0
COLOR CHANGE: 0
CHEST TIGHTNESS: 0
EYE DISCHARGE: 0
ABDOMINAL DISTENTION: 0
VOMITING: 0

## 2022-01-18 NOTE — ASSESSMENT & PLAN NOTE
Likely symptomatic PVCs. Not very frequent and not very symptomatic. Continue with conservative management. If symptoms continue or frequency increases plan for cardiac monitor.   Previous cardiac work-up within normal limits including normal echocardiogram and Holter monitor showing occasional PACs

## 2022-02-06 ENCOUNTER — TELEPHONE (OUTPATIENT)
Dept: ENDOCRINOLOGY | Age: 52
End: 2022-02-06

## 2022-02-14 DIAGNOSIS — E21.0 HYPERPARATHYROIDISM, PRIMARY (HCC): ICD-10-CM

## 2022-02-14 LAB
A/G RATIO: 1.8 (ref 1.1–2.2)
ALBUMIN SERPL-MCNC: 4.5 G/DL (ref 3.4–5)
ALP BLD-CCNC: 91 U/L (ref 40–129)
ALT SERPL-CCNC: 28 U/L (ref 10–40)
ANION GAP SERPL CALCULATED.3IONS-SCNC: 16 MMOL/L (ref 3–16)
AST SERPL-CCNC: 26 U/L (ref 15–37)
BILIRUB SERPL-MCNC: <0.2 MG/DL (ref 0–1)
BUN BLDV-MCNC: 10 MG/DL (ref 7–20)
CALCIUM SERPL-MCNC: 8.7 MG/DL (ref 8.3–10.6)
CHLORIDE BLD-SCNC: 99 MMOL/L (ref 99–110)
CO2: 27 MMOL/L (ref 21–32)
CREAT SERPL-MCNC: 0.8 MG/DL (ref 0.6–1.1)
GFR AFRICAN AMERICAN: >60
GFR NON-AFRICAN AMERICAN: >60
GLUCOSE BLD-MCNC: 128 MG/DL (ref 70–99)
POTASSIUM SERPL-SCNC: 3.6 MMOL/L (ref 3.5–5.1)
SODIUM BLD-SCNC: 142 MMOL/L (ref 136–145)
TOTAL PROTEIN: 7 G/DL (ref 6.4–8.2)

## 2022-02-18 ENCOUNTER — TELEPHONE (OUTPATIENT)
Dept: ENDOCRINOLOGY | Age: 52
End: 2022-02-18

## 2022-03-14 DIAGNOSIS — E78.2 MIXED HYPERLIPIDEMIA: ICD-10-CM

## 2022-03-14 DIAGNOSIS — I10 ESSENTIAL HYPERTENSION: ICD-10-CM

## 2022-03-14 RX ORDER — FENOFIBRATE 145 MG/1
TABLET, COATED ORAL
Qty: 90 TABLET | Refills: 1 | Status: SHIPPED | OUTPATIENT
Start: 2022-03-14 | End: 2022-09-02

## 2022-03-14 RX ORDER — LISINOPRIL AND HYDROCHLOROTHIAZIDE 12.5; 1 MG/1; MG/1
TABLET ORAL
Qty: 90 TABLET | Refills: 1 | Status: SHIPPED | OUTPATIENT
Start: 2022-03-14 | End: 2022-09-02

## 2022-03-14 NOTE — TELEPHONE ENCOUNTER
Medication:   Requested Prescriptions     Pending Prescriptions Disp Refills    lisinopril-hydroCHLOROthiazide (PRINZIDE;ZESTORETIC) 10-12.5 MG per tablet [Pharmacy Med Name: LISINOPRIL-HCTZ 10-12.5 MG TAB] 90 tablet 0     Sig: TAKE ONE TABLET BY MOUTH DAILY    fenofibrate (TRICOR) 145 MG tablet [Pharmacy Med Name: FENOFIBRATE 145 MG TABLET] 90 tablet 0     Sig: TAKE ONE TABLET BY MOUTH DAILY     Last Filled: 12.10.21 12.10.21    Last appt: 12/14/2021   Next appt: 3/18/2022    Last OARRS: No flowsheet data found.

## 2022-04-15 LAB
THYROGLOBULIN AB: 1.2 IU/ML (ref 0–4)
THYROGLOBULIN: <0.1 NG/ML (ref 1.6–50)
TSH, 3RD GENERATION: 40.53 UIU/ML (ref 0.45–4.12)

## 2022-04-18 ENCOUNTER — TELEPHONE (OUTPATIENT)
Dept: ENDOCRINOLOGY | Age: 52
End: 2022-04-18

## 2022-04-19 NOTE — TELEPHONE ENCOUNTER
Please inform patient that whole-body scans did not show any areas of increased activity. Blood test for thyroid cancer was also negative.

## 2022-06-03 DIAGNOSIS — F41.9 ANXIETY: ICD-10-CM

## 2022-06-03 NOTE — TELEPHONE ENCOUNTER
Medication:   Requested Prescriptions     Pending Prescriptions Disp Refills    sertraline (ZOLOFT) 50 MG tablet [Pharmacy Med Name: SERTRALINE HCL 50 MG TABLET] 30 tablet 3     Sig: TAKE ONE TABLET BY MOUTH DAILY     Last Filled: 2.2.22    Last appt: 12/14/2021   Next appt:  left to call and schedule OV or physical.     Last OARRS: No flowsheet data found.

## 2022-07-21 DIAGNOSIS — C73 THYROID CANCER (HCC): ICD-10-CM

## 2022-07-21 DIAGNOSIS — E83.42 HYPOMAGNESEMIA: ICD-10-CM

## 2022-07-21 DIAGNOSIS — R73.03 PREDIABETES: ICD-10-CM

## 2022-07-21 DIAGNOSIS — E89.2 POSTSURGICAL HYPOPARATHYROIDISM (HCC): ICD-10-CM

## 2022-07-21 DIAGNOSIS — E21.0 HYPERPARATHYROIDISM, PRIMARY (HCC): ICD-10-CM

## 2022-07-21 DIAGNOSIS — E28.319 PREMATURE MENOPAUSE: ICD-10-CM

## 2022-07-21 DIAGNOSIS — E06.3 HASHIMOTO'S THYROIDITIS: ICD-10-CM

## 2022-07-21 DIAGNOSIS — E78.2 MIXED HYPERLIPIDEMIA: ICD-10-CM

## 2022-07-21 DIAGNOSIS — E89.0 HYPOTHYROIDISM, POSTSURGICAL: ICD-10-CM

## 2022-07-21 LAB
A/G RATIO: 1.6 (ref 1.1–2.2)
ALBUMIN SERPL-MCNC: 4.3 G/DL (ref 3.4–5)
ALP BLD-CCNC: 74 U/L (ref 40–129)
ALT SERPL-CCNC: 29 U/L (ref 10–40)
ANION GAP SERPL CALCULATED.3IONS-SCNC: 13 MMOL/L (ref 3–16)
ANTI-THYROGLOB ABS: 17 IU/ML
AST SERPL-CCNC: 33 U/L (ref 15–37)
BILIRUB SERPL-MCNC: 0.3 MG/DL (ref 0–1)
BUN BLDV-MCNC: 12 MG/DL (ref 7–20)
CALCIUM SERPL-MCNC: 8.3 MG/DL (ref 8.3–10.6)
CHLORIDE BLD-SCNC: 103 MMOL/L (ref 99–110)
CHOLESTEROL, TOTAL: 177 MG/DL (ref 0–199)
CO2: 27 MMOL/L (ref 21–32)
CREAT SERPL-MCNC: 0.7 MG/DL (ref 0.6–1.1)
GFR AFRICAN AMERICAN: >60
GFR NON-AFRICAN AMERICAN: >60
GLUCOSE BLD-MCNC: 99 MG/DL (ref 70–99)
HDLC SERPL-MCNC: 24 MG/DL (ref 40–60)
LDL CHOLESTEROL CALCULATED: 105 MG/DL
MAGNESIUM: 1.7 MG/DL (ref 1.8–2.4)
PARATHYROID HORMONE INTACT: 16.4 PG/ML (ref 14–72)
PHOSPHORUS: 2.8 MG/DL (ref 2.5–4.9)
POTASSIUM SERPL-SCNC: 4.2 MMOL/L (ref 3.5–5.1)
SODIUM BLD-SCNC: 143 MMOL/L (ref 136–145)
T3 FREE: 2.9 PG/ML (ref 2.3–4.2)
T4 FREE: 2.2 NG/DL (ref 0.9–1.8)
TOTAL PROTEIN: 7 G/DL (ref 6.4–8.2)
TRIGL SERPL-MCNC: 239 MG/DL (ref 0–150)
TSH SERPL DL<=0.05 MIU/L-ACNC: 0.63 UIU/ML (ref 0.27–4.2)
VITAMIN D 25-HYDROXY: 60.4 NG/ML
VLDLC SERPL CALC-MCNC: 48 MG/DL

## 2022-07-22 LAB
ESTIMATED AVERAGE GLUCOSE: 134.1 MG/DL
HBA1C MFR BLD: 6.3 %

## 2022-07-23 LAB
CALCIUM IONIZED, CALC AT PH 7.4: 1.11 MMOL/L (ref 1.11–1.3)
CALCIUM IONIZED: 1.1 MMOL/L (ref 1.11–1.3)

## 2022-07-26 ENCOUNTER — OFFICE VISIT (OUTPATIENT)
Dept: ENDOCRINOLOGY | Age: 52
End: 2022-07-26
Payer: COMMERCIAL

## 2022-07-26 VITALS
HEIGHT: 62 IN | SYSTOLIC BLOOD PRESSURE: 130 MMHG | BODY MASS INDEX: 47.11 KG/M2 | DIASTOLIC BLOOD PRESSURE: 80 MMHG | HEART RATE: 87 BPM | TEMPERATURE: 98 F | WEIGHT: 256 LBS | OXYGEN SATURATION: 98 % | RESPIRATION RATE: 14 BRPM

## 2022-07-26 DIAGNOSIS — E83.42 HYPOMAGNESEMIA: ICD-10-CM

## 2022-07-26 DIAGNOSIS — E21.0 HYPERPARATHYROIDISM, PRIMARY (HCC): Primary | ICD-10-CM

## 2022-07-26 DIAGNOSIS — R73.03 PREDIABETES: ICD-10-CM

## 2022-07-26 DIAGNOSIS — E28.319 PREMATURE MENOPAUSE: ICD-10-CM

## 2022-07-26 DIAGNOSIS — E89.0 POSTOPERATIVE HYPOTHYROIDISM: ICD-10-CM

## 2022-07-26 DIAGNOSIS — E89.2 POSTSURGICAL HYPOPARATHYROIDISM (HCC): ICD-10-CM

## 2022-07-26 DIAGNOSIS — E06.3 HASHIMOTO'S THYROIDITIS: ICD-10-CM

## 2022-07-26 DIAGNOSIS — E89.0 HYPOTHYROIDISM, POSTSURGICAL: ICD-10-CM

## 2022-07-26 DIAGNOSIS — E66.01 CLASS 3 SEVERE OBESITY WITH SERIOUS COMORBIDITY AND BODY MASS INDEX (BMI) OF 45.0 TO 49.9 IN ADULT, UNSPECIFIED OBESITY TYPE (HCC): ICD-10-CM

## 2022-07-26 DIAGNOSIS — C73 THYROID CANCER (HCC): ICD-10-CM

## 2022-07-26 DIAGNOSIS — E78.2 MIXED HYPERLIPIDEMIA: ICD-10-CM

## 2022-07-26 PROCEDURE — 99215 OFFICE O/P EST HI 40 MIN: CPT | Performed by: INTERNAL MEDICINE

## 2022-07-26 NOTE — PROGRESS NOTES
SUBJECTIVE:  Chico Kumari is a 45 y.o. female who is being evaluated for hyperparathyroidism. 1. Hyperparathyroidism, primary   This started in 2020. Patient was diagnosed with hypercalcemia. The problem has been gradually worsening. Patient started medication in 2020. Currently patient is on: N/A. Misses  N/A doses a month. Current complaints: fatigue, easy gets hot  No history of kidney stones  No persistent constipation, aches, depression  Subtotal parathyroidectomy 10/16/2020, right superior, left superior, left inferior    2. Thyroid cancer  EXAM: NM THYROID CARCINOMA ABLATION        INDICATION: Malignant neoplasm of thyroid gland (CMS Dx) 58-year-old female with a history of    papillary thyroid cancer status post thyroidectomy here for postthyroidectomy radioablation. RADIOPHARMACEUTICAL:  I-131 capsule 52.1 mCi PO            Thyroid, total thyroidectomy:  -  Multifocal papillary thyroid carcinoma, classic type, involving the left  lobe (1.1 x 0.8 x 0.7 cm) and right lobe (3 mm). -  Negative for extrathyroidal extension.  -  Background thyroid with chronic lymphocytic thyroiditis and nodular  thyroid hyperplasia with adenomatoid nodules. -  Surgical resection margins are free of malignancy. History of obstructive symptoms: difficulty swallowing No, changes in voice/hoarseness No.  History of radiation to patient's neck: No  Resent iodine exposure: No  Family history includes no thyroid abnormalities. Family history of thyroid cancer: No    3. Premature menopause  Menopause since age 38  Was on Estrogen    4. Obesity  He is moderately healthy, active    5. Hashimoto's thyroiditis  Has fatigue    6. Osteopenia  Calcium in diet  No bone pain    7. Postsurgical hypoparathyroidism  Has numbness, tingling in hands. Not in face anymore, had after surgery    8. Hypomagnesia  Has fatigue    9. Prediabetes  Mother had sugar problem    10. Postsurgical hypothyroidism  Has fatigue    11. Hyperlipidemia  No muscle pain    EXAM: NM THYROID CARCINOMA ABLATION        INDICATION: Malignant neoplasm of thyroid gland (CMS Dx) 34-year-old female with a history of    papillary thyroid cancer status post thyroidectomy here for postthyroidectomy radioablation. RADIOPHARMACEUTICAL:  I-131 capsule 52.1 mCi PO            52.1   IMPRESSION:       Written home instructions, including radiation safety precautions, were provided and reviewed. Informed, written consent was obtained. All of her questions were answered to her satisfaction. She was treated with radioiodine-131 by mouth. She will return next week for a    post-treatment scan. Approved by Karolyn Jurado MD on 1/29/2021 2:30 PM EST       I have personally reviewed the images and I agree with this report. Report Verified by: Adrien Johnson MD at 1/29/2021 3:12 PM EST   52.1           10 year risk for major osteoporotic fracture is 3.8% and risk of a hip    fracture is 0.2%. EXAM: NM THYROID METASTASES WHOLE BODY       INDICATION: This patient with thyroid cancer is referred for imaging following an ablative dose    of iodine-131. TECHNICAL: Whole body thyroid carcinoma metastasis images with special attention to the neck    and chest were obtained 5 days following the administration of an ablative dose of iodine-131. COMPARISON: Pretreatment study of 1/29/2021. FINDINGS:    Whole body images again demonstrate a focus of uptake in the anterior neck. No additional areas    of radioiodine accumulation are identified outside of the neck. IMPRESSION:   1. Radioactive iodine uptake in the anterior neck. No abnormal uptake outside the neck.        Report Verified by: Adrien Johnson MD at 2/3/2021 12:04 PM EST                 EXAMINATION:    BONE DENSITOMETRY         7/31/2020 8:26 am         TECHNIQUE:    A bone density DEXA scan was performed of the lumbar spine, bilateral hips    and left forearm on a [employer] system. COMPARISON:    None. HISTORY:    ORDERING SYSTEM PROVIDED HISTORY: Hyperparathyroidism, primary [facility])    TECHNOLOGIST PROVIDED HISTORY:    Reason for exam:->Hyperparathyroidism. Please include non-dominant forearm. Is the patient pregnant?->No         FINDINGS:    LEFT FOREARM:         The BMD of the middle third of the radius of the distal forearm equals 0.64    g/cm2. The T- and Z-scores are -0.9 and -0.2 standard deviations from the    mean. This is within the normal range by WHO criteria. LUMBAR SPINE:         The bone mineral density in the lumbar spine including the L1-L4 levels is    measured at 0.89 g/cm2, which corresponds to a T-score of -1.4 and a Z-score    of -0.6. This is within the osteopenia range by WHO criteria. LEFT HIP:         The bone mineral density in the total hip is measured at 0.92 g/cm2    corresponding to a T-score of -0.2 and a Z-score of 0.3. This is within the    normal range by WHO criteria. The bone mineral density of the femoral neck is measured at 0.75 g/cm2    corresponding to a T-score of -0.9 and a Z-score of -0.1. This is within the    normal range by WHO criteria. RIGHT HIP:         The bone mineral density in the total hip is measured at 0.90 g/cm2    corresponding to a T-score of -0.3 and a Z-score of 0.2. This is within the    normal range by WHO criteria. The bone mineral density of the femoral neck is measured at 0.71 g/cm2    corresponding to a T-score of -1.2 and a Z-score of -0.5. This is within the    osteopenia range by WHO criteria. 10 year risk for major osteoporotic fracture is 3.8% and risk of a hip    fracture is 0.2%. Impression    Osteopenia by WHO criteria. EXAM: NM THYROID METASTASES WHOLE BODY   EXAM: NM THYROID METAS UPTAKE ADDL     INDICATION: Thyroid cancer (CMS Dx)     COMPARISON: Posttreatment I-131 scan dated 2/3/2021. RADIOPHARMACEUTICAL: Recombinant TSH 0.9 mg IM x 2, I-131 capsule 1.96 mCi PO.     TECHNICAL: A whole body thyroid cancer workup was performed using the recombinant TSH protocol. The patient was pretreated with recombinant TSH 2 days prior to the administration of the radiopharmaceutical Whole body thyroid carcinoma metastases imaging was performed several days thereafter. A quantitative thyroid carcinoma uptake was also performed. FINDINGS:      Whole body images demonstrate the expected normal concentration of the radioiodine-131 in the head, abdomen, pelvis and extremities. No abnormal areas of increased radioiodine are seen. Previously demonstrated focal radioiodine uptake at the anterior neck has resolved study. The 48-hour uptake at the level of the neck is 0.2%     Ref Range & Units 3 mo ago Comments   Thyroglobulin (Rhoda) 1.6 - 50.0 ng/mL <0.1 Low   Biotin megadosing (consumption >300 mcg/day) may falsely decrease thyroglobulin. When indicated, discontinue megadosing for 1 week and repeat testing.    Thyroglobulin Antibody (Rhoda) 0.0 - 4.0 IU/mL 1.2           Past Medical History:   Diagnosis Date    Anxiety     Endometriosis     Hyperlipidemia     Hypertension      Patient Active Problem List    Diagnosis Date Noted    Palpitations 12/07/2021    Class 3 severe obesity with body mass index (BMI) of 45.0 to 49.9 in [facility]) 09/26/2021    Prediabetes 03/09/2021    Mixed hyperlipidemia 03/09/2021    Snoring 03/09/2021    Hypothyroidism, postsurgical 12/08/2020    Postsurgical hypoparathyroidism ([address] 12/08/2020    Hypomagnesemia 12/08/2020    Thyroid cancer [address] [address] 12/08/2020    Hashimoto's thyroiditis 08/06/2020    Osteopenia 08/01/2020    Class 3 severe obesity with body mass index (BMI) of 40.0 to 44.9 in [facility]) 07/07/2020    Hyperparathyroidism, primary ([address] 07/07/2020    Premature menopause 07/07/2020    Epigastric abdominal pain 06/12/2020    Nausea 06/12/2020    Low back pain without sciatica 06/12/2020    Diarrhea 06/12/2020    Allergic rhinitis 03/20/2020    Pain of right middle finger 03/20/2020    Anxiety 05/10/2018    Gastroesophageal reflux disease 10/30/2016    Surgical menopause on hormone replacement therapy 12/17/2015    Hypertriglyceridemia 12/17/2015    HTN (hypertension) 07/01/2015    Colon polyp 03/18/2015     Past Surgical History:   Procedure Laterality Date    ACHILLES TENDON SURGERY Right 6/24/2019    RIGHT ACHILLES TENDON RELEASE AND HEEL EXOSTECTOMY performed by Manda Russo DPM at [place] [address]    [address]    COLONOSCOPY  03/18/2015    Brien GOMEZ    FOOT SURGERY Left 2013    left foot recontstructon    HYSTERECTOMY (CERVIX STATUS UNKNOWN)  2013    complete - vaginal hyst    THYROIDECTOMY      TUBAL LIGATION  2013    with ablation    US THYROID BIOPSY  7/31/2020    US THYROID BIOPSY F ULTRASOUND     Family History   Problem Relation Age of Onset    Diabetes Mother     High Blood Pressure Mother     Stroke Mother     High Blood Pressure Father     Cancer Maternal Aunt 58        breast    Cancer Maternal Grandmother 72        colon cancer    Cancer Paternal Grandmother         lung     Social History     Socioeconomic History    Marital status:      Spouse name: None    Number of children: None    Years of education: None    Highest education level: None   Tobacco Use    Smoking status: Never    Smokeless tobacco: Never   Vaping Use    Vaping Use: Never used   Substance and Sexual Activity    Alcohol use:  Yes     Alcohol/week: 0.8 standard drinks     Types: 1 Standard drinks or equivalent per week     Comment: social    Drug use: No    Sexual activity: Yes     Partners: Male     Current Outpatient Medications   Medication Sig Dispense Refill    sertraline (ZOLOFT) 50 MG tablet TAKE ONE TABLET BY MOUTH DAILY 30 tablet 1    lisinopril-hydroCHLOROthiazide (PRINZIDE;ZESTORETIC) 10-12.5 MG per tablet TAKE ONE TABLET BY MOUTH DAILY 90 tablet 1    fenofibrate (TRICOR) 145 MG tablet TAKE ONE TABLET BY MOUTH DAILY 90 tablet 1    calcitRIOL (ROCALTROL) 0.25 MCG capsule Take 1 capsule by mouth every other day 90 capsule 0    levothyroxine (SYNTHROID) 200 MCG tablet TAKE ONE TABLET BY MOUTH DAILY 90 tablet 1    Vitamin D, Ergocalciferol, 50 MCG (2000 UT) CAPS 2 caps daily 30 capsule 0    Calcium Carbonate Antacid (TUMS ULTRA 1000) 1000 MG CHEW 1 tablet twice daily (Patient taking differently: 1 tablet twice daily prn) 60 tablet 0    Acetaminophen (TYLENOL ARTHRITIS PAIN PO) Take by mouth PRN      Multiple Vitamins-Minerals (THERAPEUTIC MULTIVITAMIN-MINERALS) tablet Take 1 tablet by mouth daily      fluticasone (FLONASE) 50 MCG/ACT nasal spray 2 sprays by Nasal route daily. 1 Bottle 3    pantoprazole (PROTONIX) 40 MG tablet TAKE ONE TABLET BY MOUTH TWICE A DAY (Patient not taking: Reported on 2022) 180 tablet 1    magnesium (MAGNESIUM-OXIDE) 250 MG TABS tablet Take 1 tablet by mouth 2 times daily (Patient not taking: Reported on 2022) 60 tablet 1    promethazine (PHENERGAN) 25 MG tablet Take 1 tablet by mouth every 6 hours as needed for Nausea (Patient not taking: No sig reported) 20 tablet 0     No current facility-administered medications for this visit.      Allergies   Allergen Reactions    Sulfa Antibiotics Rash     Family Status   Relation Name Status    Mother  Alive    Father  [employer]  (Not Specified)    MGM  (Not Specified)    Sister  Alive    Brother  Alive    PGM  (Not Specified)    Sister   at age 32        Down Syndrome       Review of Systems:  Constitutional: has fatigue, no fever, no recent weight gain, no recent weight loss, no changes in appetite  Eyes: no eye pain, no change in vision, no eye redness, no eye irritation, no double vision  Ears, nose, throat: no nasal congestion, no sore throat, no earache, no decrease in hearing, no hoarseness, no dry mouth, no sinus problems, no difficulty swallowing, no neck lumps, no dental problems, no mouth sores, no ringing in ears  Pulmonary: no shortness of breath, no wheezing, no dyspnea on exertion, no cough  Cardiovascular: no chest pain, no lower extremity edema, no orthopnea, no intermittent leg claudication, no palpitations  Gastrointestinal: has abdominal pain, no nausea, no vomiting, has diarrhea, has constipation, no dysphagia, has heartburn, no bloating  Genitourinary: no dysuria, no urinary incontinence, no urinary hesitancy, no urinary frequency, no feelings of urinary urgency, no nocturia  Musculoskeletal: no joint swelling, no joint stiffness, has joint pain, has muscle cramps, no muscle pain, no bone pain  Integument/Breast: no hair loss, no skin rashes, no skin lesions, no itching, no dry skin  Neurological: no numbness, no tingling, no weakness, no confusion, no headaches, no dizziness, no fainting, no tremors, no decrease in memory, no balance problems  Psychiatric: no anxiety, no depression, no insomnia  Hematologic/Lymphatic: no tendency for easy bleeding, no swollen lymph nodes, no tendency for easy bruising  Immunology: has seasonal allergies, no frequent infections, no frequent illnesses  Endocrine: has temperature intolerance    /80   Pulse 87   Temp 98 °F (36.7 °C)   Resp 14   Ht 5' 2\" (1.575 m)   Wt 256 lb (116.1 kg)   SpO2 98%   BMI 46.82 kg/m²    Wt Readings from Last 3 Encounters:   07/26/22 256 lb (116.1 kg)   01/18/22 254 lb 12.8 oz (115.6 kg)   01/13/22 254 lb (115.2 kg)     Body mass index is 46.82 kg/m².     OBJECTIVE:  Constitutional: no acute distress, well appearing and well nourished  Psychiatric: oriented to person, place and time, judgement and insight and normal, recent and remote memory and intact and mood and affect are normal  Skin: skin and subcutaneous tissue is normal without mass, normal turgor  Head and Face: examination of head and face revealed no abnormalities  Eyes: no lid or conjunctival swelling, erythema or discharge, pupils are normal, equal, round, reactive to light  Ears/Nose: external inspection of ears and nose revealed no abnormalities, hearing is grossly normal  Oropharynx/Mouth/Face: lips, tongue and gums are normal with no lesions, the voice quality was normal  Neck: neck is supple and symmetric, with midline trachea and no masses, thyroid is absent, no palpable masses  Lymphatics: normal cervical lymph nodes, normal supraclavicular nodes  Pulmonary: no increased work of breathing or signs of respiratory distress, lungs are clear to auscultation  Cardiovascular: normal heart rate and rhythm, normal S1 and S2, no murmurs and pedal pulses and 2+ bilaterally, No edema  Abdomen: abdomen is soft, non-tender with no masses  Musculoskeletal: normal gait and station and exam of the digits and nails are normal  Neurological: normal coordination and normal general cortical function      Lab Review:    Lab Results   Component Value Date/Time    WBC 5.7 12/07/2021 10:24 AM    HGB 12.1 12/07/2021 10:24 AM    HCT 37.9 12/07/2021 10:24 AM    MCV 79.9 12/07/2021 10:24 AM     12/07/2021 10:24 AM     Lab Results   Component Value Date/Time     07/21/2022 10:59 AM    K 4.2 07/21/2022 10:59 AM     07/21/2022 10:59 AM    CO2 27 07/21/2022 10:59 AM    BUN 12 07/21/2022 10:59 AM    CREATININE 0.7 07/21/2022 10:59 AM    GLUCOSE 99 07/21/2022 10:59 AM    CALCIUM 8.3 07/21/2022 10:59 AM    PROT 7.0 07/21/2022 10:59 AM    LABALBU 4.3 07/21/2022 10:59 AM    BILITOT 0.3 07/21/2022 10:59 AM    ALKPHOS 74 07/21/2022 10:59 AM    AST 33 07/21/2022 10:59 AM    ALT 29 07/21/2022 10:59 AM    LABGLOM >60 07/21/2022 10:59 AM    GFRAA >60 07/21/2022 10:59 AM    AGRATIO 1.6 07/21/2022 10:59 AM    GLOB 2.7 08/02/2021 02:07 PM     Lab Results   Component Value Date/Time    TSH 0.63 07/21/2022 10:59 AM    FT3 2.9 07/21/2022 10:59 AM     Lab Results   Component Value Date/Time    LABA1C 6.3 07/21/2022 10:59 AM     Lab Results   Component Value Date/Time    .1 07/21/2022 10:59 AM     Lab Results   Component Value Date/Time    CHOL 177 07/21/2022 10:59 AM     Lab Results   Component Value Date/Time    TRIG 239 07/21/2022 10:59 AM     Lab Results   Component Value Date/Time    HDL 24 07/21/2022 10:59 AM     Lab Results   Component Value Date/Time    LDLCALC 105 07/21/2022 10:59 AM     Lab Results   Component Value Date/Time    LABVLDL 48 07/21/2022 10:59 AM     No results found for: [facility]  Lab Results   Component Value Date/Time    LABMICR YES 06/05/2020 12:11 PM     Lab Results   Component Value Date/Time    VITD25 60.4 07/21/2022 10:59 AM        ASSESSMENT/PLAN:  1. Hyperparathyroidism, primary   Calcium - Tums 1000 mg 0-2 a day, not every day  Vitamin D 2000 IU daily  Magnesium 250 mg daily  Calcium 8.7-8.6-9.0-8.5-8.4-8.8-8.3  Albumin 4.5-4.4-4.3  PTH 8.5-11.4-14.1-15.5-16.4  Magnesium 1.7-1.7-1.7-1.5-1.7  25 hydroxy vitamin D 41.5-47.0-44.2-37.6-46.3-60.4  TSH 2.1  Subtotal parathyroidectomy-right superior, left superior, left inferior 10/16/2020   Total thyroidectomy    - Vitamin D 25 Hydroxy; Future  - Calcium Ionized Serum; Future  - PTH, Intact; Future    2. Thyroid cancer  Thyroglobulin less than 0.5-<0.1-<0.5-<0.5  Thyroglobulin antibody 13    1/2021  RADIOPHARMACEUTICAL:  I-131 capsule 52.1 mCi PO      Total thyroidectomy 10/16/2020  [id number]  Pre-Operative Diagnosis: Multinodular goiter, hypercalcemia  Post-Operative Diagnosis:     Same  Specimen(s) Submitted:   A. Rt. superior parathyroid; B. Lt. superior  parathyroid; C. Total thyroid; D. Lt. inferior parathyroid  A. Right superior parathyroid, parathyroidectomy:  -  Hypercellular parathyroid gland (35 mg). B.  Left superior parathryoid, parathyroidectomy:  -  Enlarged, hypercellular parathyroid (190 mg).   C.  Thyroid, total thyroidectomy:  -  Multifocal papillary thyroid carcinoma, classic type, involving the left  lobe (1.1 x 0.8 x 0.7 cm) and right lobe (3 mm). -  Negative for extrathyroidal extension.  -  Background thyroid with chronic lymphocytic thyroiditis and nodular  thyroid hyperplasia with adenomatoid nodules. -  Surgical resection margins are free of malignancy. -  See synoptic report for details. D.  Left inferior parathyroid, parathyroidectomy:  -  Benign parathyroid tissue (20 mg). SYNOPTIC REPORT:  Report Name: Thyroid Gland - Resection,  Version: [de-identified],   Inst#: 1  Specimen    Procedure: Total thyroidectomy  Tumor    Tumor Focality: Multifocal    Tumor Characteristics      Tumor Site: Left lobe      Histologic Type: Papillary carcinoma, classic (usual,                       conventional)      Tumor Size: 1.1 x 0.8 x 0.7 Centimeters (cm)      Extrathyroidal Extension: Not identified      Angioinvasion (vascular invasion): Not identified      Lymphatic Invasion: Not identified      Margins: Uninvolved by carcinoma  Lymph Nodes    Regional Lymph Nodes: No lymph nodes submitted or found  Pathologic Stage Classification (pTNM, AJCC 8th Edition)    TNM Descriptors: m (multiple primary tumors)    Primary Tumor (pT): pT1b    Regional Lymph Nodes (pN): pNX  Comments      Second small focus of papillary thyroid carcinoma is present in the  right lobe measuring 3 mm. - US Head Neck Soft Tissue Thyroid; Future  -Thyroglobulin  -Thyroglobulin antibody  EXAM:  NM THYROID METASTASES LIMITED   EXAM: NM THYROID METAS UPTAKE ADDL       INDICATION: Malignant neoplasm of thyroid gland (CMS Dx); This patient is referred for the    evaluation of residual thyroid tissue post thyroidectomy. RADIOPHARMACEUTICAL:  Recombinant TSH 0.9 mg IM x 2, I-123 capsule 335 uCi PO       TECHNICAL:  A limited thyroid cancer workup was performed using the recombinant TSH protocol. Recombinant TSH was administered prior to the administration of iodine-123.   Imaging of the    neck and chest was performed 24 hours following radioiodine-123 administration. A quantitative    uptake was also performed at the level of the neck. COMPARISON:  None           FINDINGS:        Images demonstrate a focal area of uptake in the anterior neck. The 24 hour radioiodine uptake at the level of the neck is 0.8%. 4/15/2022  Tg <0.1, antibodies negative  Whole body images demonstrate the expected normal concentration of the radioiodine-131 in the head, abdomen, pelvis and extremities. No abnormal areas of increased radioiodine are seen. Previously demonstrated focal radioiodine uptake at the anterior neck has resolved study. The 48-hour uptake at the level of the neck is 0.2%. 3. Obesity  Diet and exercise    4. Premature menopause  Calcium and vitamin D supplementation  DEXA scan    5. Hashimoto's thyroiditis  TSH 2.34    6. Osteopenia  Calcium, vitamin D supplementation was discussed, written instructions were given how to calculate calcium in diet  Calcium, vitamin D  Counseled patient on risk fracture  10 year risk for major osteoporotic fracture is 3.8% and risk of a hip    fracture is 0.2%. 7.  Postsurgical hypothyroidism  Has more palpitations  Continue levothyroxine 0.200 mg daily  Total thyroidectomy 10/16/2020  TSH 2.1-2.4-2.34-6.84-0.97-1.27-0.63  -TSH  -Free T4  -Free T3    8. Postsurgical hypoparathyroidism  1000 mg Tums bid once in a while  Calcitriol 0.25 mcg 1 tablet every other day  Vitamin D3 4000 IU   Magnesium citrate 1 caps daily  PTH 5.9-11.4  -CMP  -PTH  -Ionized calcium  -Phosphorus  -Magnesium  -25 hydroxy vitamin D    9. Hypomagnesemia  Uncontrolled  Magnesium 250 mg only taking one because of diarrhea  Magnesium 1.8-1.7-1.5-1.7  -Magnesium    10. Prediabetes  Glucose 114-104-116  HbA1C 6.2-6.3    11.   Hyperlipidemia  LDL   Triglycerides 290  HDL 26    Reviewed and/or ordered clinical lab results Yes  Reviewed and/or ordered radiology tests Yes   Reviewed and/or ordered other diagnostic tests No  Discussed test results with performing physician No  Independently reviewed image, tracing, or specimen No  Made a decision to obtain old records No  Reviewed and summarized old records Yes   Calcium elevated since 2015, later upper limit of normal, in 2020 elevated again  PTH 38  Calcium 11.1-10.5-10.2-10.1-10.8-11.3, upper limit normal 10.6  TSH 1.4  RADIOPHARMACEUTICAL:  I-131 capsule 52.1 mCi PO            Obtained history from other than patient No    Mackenzie Savage Rosas was counseled regarding symptoms of primary hyperparathyroidism, thyroid cancer, postsurgical hypothyroidism, postsurgical hypoparathyroidism, hypomagnesemia, premature menopause diagnosis, course and complications of disease if inadequately treated, side effects of medications, diagnosis, treatment options, and prognosis, risks, benefits, complications, and alternatives of treatment, labs, imaging and other studies and treatment targets and goals, thyroid cancer management, surveillance, recurrence prevention, hypoparathyroidism management, follow-up and monitoring, medication adjustments    She understands instructions and counseling. Total time I spent for this encounter 40 minutes    Return in about 3 months (around 10/26/2022) for parathyroid, thyroid problems.     Electronically signed by Asia Heredia MD on 7/26/2022 at 11:35 AM

## 2022-07-28 RX ORDER — CALCITRIOL 0.25 UG/1
CAPSULE, LIQUID FILLED ORAL
Qty: 60 CAPSULE | Refills: 1 | Status: SHIPPED | OUTPATIENT
Start: 2022-07-28

## 2022-07-31 LAB — THYROGLOBULIN BY LC-MS/MS, SERUM/PLASMA: <0.5 NG/ML (ref 1.3–31.8)

## 2022-08-02 DIAGNOSIS — F41.9 ANXIETY: ICD-10-CM

## 2022-08-02 DIAGNOSIS — E89.2 POSTSURGICAL HYPOPARATHYROIDISM (HCC): ICD-10-CM

## 2022-08-02 RX ORDER — LEVOTHYROXINE SODIUM 0.2 MG/1
TABLET ORAL
Qty: 30 TABLET | Refills: 2 | Status: SHIPPED | OUTPATIENT
Start: 2022-08-02 | End: 2022-10-31

## 2022-08-11 ENCOUNTER — OFFICE VISIT (OUTPATIENT)
Dept: PRIMARY CARE CLINIC | Age: 52
End: 2022-08-11
Payer: COMMERCIAL

## 2022-08-11 VITALS
WEIGHT: 259 LBS | SYSTOLIC BLOOD PRESSURE: 132 MMHG | BODY MASS INDEX: 47.37 KG/M2 | DIASTOLIC BLOOD PRESSURE: 86 MMHG | HEART RATE: 99 BPM | OXYGEN SATURATION: 97 % | TEMPERATURE: 97.2 F

## 2022-08-11 DIAGNOSIS — R11.0 NAUSEA: ICD-10-CM

## 2022-08-11 DIAGNOSIS — N39.0 URINARY TRACT INFECTION WITHOUT HEMATURIA, SITE UNSPECIFIED: Primary | ICD-10-CM

## 2022-08-11 DIAGNOSIS — R82.998 LEUKOCYTES IN URINE: ICD-10-CM

## 2022-08-11 LAB
BILIRUBIN, POC: NORMAL
BLOOD URINE, POC: NORMAL
CLARITY, POC: CLEAR
COLOR, POC: YELLOW
GLUCOSE URINE, POC: NORMAL
KETONES, POC: NORMAL
LEUKOCYTE EST, POC: NORMAL
NITRITE, POC: NORMAL
PH, POC: 5.5
PROTEIN, POC: NORMAL
SPECIFIC GRAVITY, POC: 1.02
UROBILINOGEN, POC: 0.2

## 2022-08-11 PROCEDURE — 99213 OFFICE O/P EST LOW 20 MIN: CPT | Performed by: INTERNAL MEDICINE

## 2022-08-11 PROCEDURE — 81002 URINALYSIS NONAUTO W/O SCOPE: CPT | Performed by: INTERNAL MEDICINE

## 2022-08-11 RX ORDER — PROMETHAZINE HYDROCHLORIDE 25 MG/1
25 TABLET ORAL EVERY 6 HOURS PRN
Qty: 12 TABLET | Refills: 0 | Status: SHIPPED | OUTPATIENT
Start: 2022-08-11

## 2022-08-11 RX ORDER — CIPROFLOXACIN 250 MG/1
250 TABLET, FILM COATED ORAL 2 TIMES DAILY
Qty: 14 TABLET | Refills: 0 | Status: SHIPPED | OUTPATIENT
Start: 2022-08-11 | End: 2022-08-18

## 2022-08-11 SDOH — ECONOMIC STABILITY: FOOD INSECURITY: WITHIN THE PAST 12 MONTHS, THE FOOD YOU BOUGHT JUST DIDN'T LAST AND YOU DIDN'T HAVE MONEY TO GET MORE.: NEVER TRUE

## 2022-08-11 SDOH — ECONOMIC STABILITY: FOOD INSECURITY: WITHIN THE PAST 12 MONTHS, YOU WORRIED THAT YOUR FOOD WOULD RUN OUT BEFORE YOU GOT MONEY TO BUY MORE.: NEVER TRUE

## 2022-08-11 ASSESSMENT — PATIENT HEALTH QUESTIONNAIRE - PHQ9
8. MOVING OR SPEAKING SO SLOWLY THAT OTHER PEOPLE COULD HAVE NOTICED. OR THE OPPOSITE, BEING SO FIGETY OR RESTLESS THAT YOU HAVE BEEN MOVING AROUND A LOT MORE THAN USUAL: 0
3. TROUBLE FALLING OR STAYING ASLEEP: 0
SUM OF ALL RESPONSES TO PHQ QUESTIONS 1-9: 0
4. FEELING TIRED OR HAVING LITTLE ENERGY: 0
SUM OF ALL RESPONSES TO PHQ9 QUESTIONS 1 & 2: 0
2. FEELING DOWN, DEPRESSED OR HOPELESS: 0
1. LITTLE INTEREST OR PLEASURE IN DOING THINGS: 0
5. POOR APPETITE OR OVEREATING: 0
SUM OF ALL RESPONSES TO PHQ QUESTIONS 1-9: 0
6. FEELING BAD ABOUT YOURSELF - OR THAT YOU ARE A FAILURE OR HAVE LET YOURSELF OR YOUR FAMILY DOWN: 0
10. IF YOU CHECKED OFF ANY PROBLEMS, HOW DIFFICULT HAVE THESE PROBLEMS MADE IT FOR YOU TO DO YOUR WORK, TAKE CARE OF THINGS AT HOME, OR GET ALONG WITH OTHER PEOPLE: 0
9. THOUGHTS THAT YOU WOULD BE BETTER OFF DEAD, OR OF HURTING YOURSELF: 0
SUM OF ALL RESPONSES TO PHQ QUESTIONS 1-9: 0
7. TROUBLE CONCENTRATING ON THINGS, SUCH AS READING THE NEWSPAPER OR WATCHING TELEVISION: 0
SUM OF ALL RESPONSES TO PHQ QUESTIONS 1-9: 0

## 2022-08-11 ASSESSMENT — SOCIAL DETERMINANTS OF HEALTH (SDOH): HOW HARD IS IT FOR YOU TO PAY FOR THE VERY BASICS LIKE FOOD, HOUSING, MEDICAL CARE, AND HEATING?: NOT HARD AT ALL

## 2022-08-11 NOTE — PROGRESS NOTES
Mackenzie Palomino   Date ofBirth:  1970    Date of Visit:  8/11/2022    Chief Complaint   Patient presents with    Other     Burning with urination    Urinary Frequency       HPI  Patient complains of symptoms since yesterday or day before of ache in lower abdomen after urination, urinary urgency, little nausea for a day or so that goes away with sucking on a mint, back pain, and feeling like she is not totally emptying her bladder. Review of Systems   Constitutional:  Negative for appetite change, chills and fever. Respiratory:  Negative for shortness of breath. Cardiovascular:  Negative for chest pain. Gastrointestinal:  Positive for abdominal pain and nausea. Negative for abdominal distention, blood in stool, constipation, diarrhea and vomiting. Genitourinary:  Positive for urgency. Negative for decreased urine volume, difficulty urinating, dysuria, flank pain, frequency and hematuria. Musculoskeletal:  Positive for back pain. Neurological:  Negative for dizziness.      Allergies   Allergen Reactions    Sulfa Antibiotics Rash     Outpatient Medications Marked as Taking for the 8/11/22 encounter (Office Visit) with Noreen Vargas MD   Medication Sig Dispense Refill    levothyroxine (SYNTHROID) 200 MCG tablet TAKE ONE TABLET BY MOUTH DAILY 30 tablet 2    sertraline (ZOLOFT) 50 MG tablet TAKE ONE TABLET BY MOUTH DAILY 30 tablet 0    calcitRIOL (ROCALTROL) 0.25 MCG capsule TAKE ONE CAPSULE BY MOUTH TWICE A DAY 60 capsule 1    lisinopril-hydroCHLOROthiazide (PRINZIDE;ZESTORETIC) 10-12.5 MG per tablet TAKE ONE TABLET BY MOUTH DAILY 90 tablet 1    fenofibrate (TRICOR) 145 MG tablet TAKE ONE TABLET BY MOUTH DAILY 90 tablet 1    magnesium (MAGNESIUM-OXIDE) 250 MG TABS tablet Take 1 tablet by mouth 2 times daily 60 tablet 1    Vitamin D, Ergocalciferol, 50 MCG (2000 UT) CAPS 2 caps daily 30 capsule 0    Calcium Carbonate Antacid (TUMS ULTRA 1000) 1000 MG CHEW 1 tablet twice daily (Patient taking differently: 1 tablet twice daily prn) 60 tablet 0    Acetaminophen (TYLENOL ARTHRITIS PAIN PO) Take by mouth PRN      Multiple Vitamins-Minerals (THERAPEUTIC MULTIVITAMIN-MINERALS) tablet Take 1 tablet by mouth daily      fluticasone (FLONASE) 50 MCG/ACT nasal spray 2 sprays by Nasal route daily. 1 Bottle 3         Vitals:    08/11/22 1447   BP: 132/86   Pulse: 99   Temp: 97.2 °F (36.2 °C)   SpO2: 97%   Weight: 259 lb (117.5 kg)     Body mass index is 47.37 kg/m². Physical Exam  Nursing note reviewed. Constitutional:       General: She is not in acute distress. Appearance: Normal appearance. She is well-developed. HENT:      Mouth/Throat:      Pharynx: Oropharynx is clear. Eyes:      Extraocular Movements: Extraocular movements intact. Pupils: Pupils are equal, round, and reactive to light. Cardiovascular:      Rate and Rhythm: Normal rate and regular rhythm. Heart sounds: Normal heart sounds, S1 normal and S2 normal. No murmur heard. Pulmonary:      Effort: Pulmonary effort is normal.      Breath sounds: Normal breath sounds. Abdominal:      General: Bowel sounds are normal. There is no distension. Palpations: Abdomen is soft. Tenderness: abdominal tenderness in the suprapubic area   Musculoskeletal:      Lumbar back: No tenderness. Neurological:      Mental Status: She is alert.          Results for POC orders placed in visit on 08/11/22   POCT Urinalysis no Micro   Result Value Ref Range    Color, UA yellow     Clarity, UA clear     Glucose, UA POC neg     Bilirubin, UA neg     Ketones, UA neg     Spec Grav, UA 1.025     Blood, UA POC neg     pH, UA 5.5     Protein, UA POC neg     Urobilinogen, UA 0.2     Leukocytes, UA trace     Nitrite, UA neg      Lab Review   Orders Only on 07/21/2022   Component Date Value    Cholesterol, Total 07/21/2022 177     Triglycerides 07/21/2022 239 (A)    HDL 07/21/2022 24 (A)    LDL Calculated 07/21/2022 105 (A)    VLDL Cholesterol tablet by mouth in the morning and 1 tablet before bedtime. Do all this for 7 days. Dispense: 14 tablet; Refill: 0  -increase water intake    2. Leukocytes in urine  - Culture, Urine    3. Nausea  - promethazine (PHENERGAN) 25 MG tablet; Take 1 tablet by mouth every 6 hours as needed for Nausea  Dispense: 12 tablet; Refill: 0        Discussed medications with patient, who voiced understanding of their use and indications. All questions answered. Return if symptoms worsen or fail to improve.

## 2022-08-12 LAB — URINE CULTURE, ROUTINE: NORMAL

## 2022-08-23 PROBLEM — N39.0 URINARY TRACT INFECTION WITHOUT HEMATURIA: Status: ACTIVE | Noted: 2022-08-23

## 2022-08-23 ASSESSMENT — ENCOUNTER SYMPTOMS
SHORTNESS OF BREATH: 0
DIARRHEA: 0
ABDOMINAL PAIN: 1
ABDOMINAL DISTENTION: 0
VOMITING: 0
BACK PAIN: 1
CONSTIPATION: 0
BLOOD IN STOOL: 0
NAUSEA: 1

## 2022-09-01 DIAGNOSIS — F41.9 ANXIETY: ICD-10-CM

## 2022-09-01 DIAGNOSIS — E78.2 MIXED HYPERLIPIDEMIA: ICD-10-CM

## 2022-09-01 DIAGNOSIS — I10 ESSENTIAL HYPERTENSION: ICD-10-CM

## 2022-09-01 NOTE — TELEPHONE ENCOUNTER
Medication:   Requested Prescriptions     Pending Prescriptions Disp Refills    fenofibrate (TRICOR) 145 MG tablet [Pharmacy Med Name: FENOFIBRATE 145 MG TABLET] 90 tablet 1     Sig: TAKE ONE TABLET BY MOUTH DAILY    lisinopril-hydroCHLOROthiazide (PRINZIDE;ZESTORETIC) 10-12.5 MG per tablet [Pharmacy Med Name: LISINOPRIL-HCTZ 10-12.5 MG TAB] 90 tablet 1     Sig: TAKE ONE TABLET BY MOUTH DAILY    sertraline (ZOLOFT) 50 MG tablet [Pharmacy Med Name: SERTRALINE HCL 50 MG TABLET] 30 tablet 0     Sig: TAKE ONE TABLET BY MOUTH DAILY       Last Filled:      Patient Phone Number: [phone] (home)     Last appt: 8/11/2022   Next appt: 9/20/2022    Last BMP:   Lab Results   Component Value Date/Time     07/21/2022 10:59 AM    K 4.2 07/21/2022 10:59 AM     07/21/2022 10:59 AM    CO2 27 07/21/2022 10:59 AM    ANIONGAP 13 07/21/2022 10:59 AM    GLUCOSE 99 07/21/2022 10:59 AM    BUN 12 07/21/2022 10:59 AM    CREATININE 0.7 07/21/2022 10:59 AM    LABGLOM >60 07/21/2022 10:59 AM    GFRAA >60 07/21/2022 10:59 AM    CALCIUM 8.3 07/21/2022 10:59 AM      Last CMP:   Lab Results   Component Value Date/Time     07/21/2022 10:59 AM    K 4.2 07/21/2022 10:59 AM     07/21/2022 10:59 AM    CO2 27 07/21/2022 10:59 AM    ANIONGAP 13 07/21/2022 10:59 AM    GLUCOSE 99 07/21/2022 10:59 AM    BUN 12 07/21/2022 10:59 AM    CREATININE 0.7 07/21/2022 10:59 AM    LABGLOM >60 07/21/2022 10:59 AM    GFRAA >60 07/21/2022 10:59 AM    PROT 7.0 07/21/2022 10:59 AM    LABALBU 4.3 07/21/2022 10:59 AM    AGRATIO 1.6 07/21/2022 10:59 AM    BILITOT 0.3 07/21/2022 10:59 AM    ALKPHOS 74 07/21/2022 10:59 AM    ALT 29 07/21/2022 10:59 AM    AST 33 07/21/2022 10:59 AM    GLOB 2.7 08/02/2021 02:07 PM     Last Renal Function:   Lab Results   Component Value Date/Time     07/21/2022 10:59 AM    K 4.2 07/21/2022 10:59 AM     07/21/2022 10:59 AM    CO2 27 07/21/2022 10:59 AM    GLUCOSE 99 07/21/2022 10:59 AM    BUN 12 07/21/2022 10:59 AM    CREATININE 0.7 07/21/2022 10:59 AM    PHOS 2.8 07/21/2022 10:59 AM    LABALBU 4.3 07/21/2022 10:59 AM    CALCIUM 8.3 07/21/2022 10:59 AM    GFRAA >60 07/21/2022 10:59 AM       Last OARRS: No flowsheet data found.     Preferred Pharmacy:   [facility] [id number]  [address]  Phone: [phone] Fax: [phone]  Medication:   Requested Prescriptions     Pending Prescriptions Disp Refills    fenofibrate (TRICOR) 145 MG tablet [Pharmacy Med Name: FENOFIBRATE 145 MG TABLET] 90 tablet 1     Sig: TAKE ONE TABLET BY MOUTH DAILY    lisinopril-hydroCHLOROthiazide (PRINZIDE;ZESTORETIC) 10-12.5 MG per tablet [Pharmacy Med Name: LISINOPRIL-HCTZ 10-12.5 MG TAB] 90 tablet 1     Sig: TAKE ONE TABLET BY MOUTH DAILY    sertraline (ZOLOFT) 50 MG tablet [Pharmacy Med Name: SERTRALINE HCL 50 MG TABLET] 30 tablet 0     Sig: TAKE ONE TABLET BY MOUTH DAILY       Last Filled:      Patient Phone Number: [phone] (home)     Last appt: 8/11/2022   Next appt: 9/20/2022    Last BMP:   Lab Results   Component Value Date/Time     07/21/2022 10:59 AM    K 4.2 07/21/2022 10:59 AM     07/21/2022 10:59 AM    CO2 27 07/21/2022 10:59 AM    ANIONGAP 13 07/21/2022 10:59 AM    GLUCOSE 99 07/21/2022 10:59 AM    BUN 12 07/21/2022 10:59 AM    CREATININE 0.7 07/21/2022 10:59 AM    LABGLOM >60 07/21/2022 10:59 AM    GFRAA >60 07/21/2022 10:59 AM    CALCIUM 8.3 07/21/2022 10:59 AM      Last CMP:   Lab Results   Component Value Date/Time     07/21/2022 10:59 AM    K 4.2 07/21/2022 10:59 AM     07/21/2022 10:59 AM    CO2 27 07/21/2022 10:59 AM    ANIONGAP 13 07/21/2022 10:59 AM    GLUCOSE 99 07/21/2022 10:59 AM    BUN 12 07/21/2022 10:59 AM    CREATININE 0.7 07/21/2022 10:59 AM    LABGLOM >60 07/21/2022 10:59 AM    GFRAA >60 07/21/2022 10:59 AM    PROT 7.0 07/21/2022 10:59 AM    LABALBU 4.3 07/21/2022 10:59 AM    AGRATIO 1.6 07/21/2022 10:59 AM    BILITOT 0.3 07/21/2022 10:59 AM    ALKPHOS 74 07/21/2022 10:59 AM    ALT 29 07/21/2022 10:59 AM    AST 33 07/21/2022 10:59 AM    GLOB 2.7 08/02/2021 02:07 PM     Last Renal Function:   Lab Results   Component Value Date/Time     07/21/2022 10:59 AM    K 4.2 07/21/2022 10:59 AM     07/21/2022 10:59 AM    CO2 27 07/21/2022 10:59 AM    GLUCOSE 99 07/21/2022 10:59 AM    BUN 12 07/21/2022 10:59 AM    CREATININE 0.7 07/21/2022 10:59 AM    PHOS 2.8 07/21/2022 10:59 AM    LABALBU 4.3 07/21/2022 10:59 AM    CALCIUM 8.3 07/21/2022 10:59 AM    GFRAA >60 07/21/2022 10:59 AM       Last OARRS: No flowsheet data found.     Preferred Pharmacy:   [facility] [id number]  [address] [place] [address] Road  Phone: [phone] Fax: [phone]

## 2022-09-02 RX ORDER — FENOFIBRATE 145 MG/1
TABLET, COATED ORAL
Qty: 30 TABLET | Refills: 0 | Status: SHIPPED | OUTPATIENT
Start: 2022-09-02 | End: 2022-09-20 | Stop reason: SDUPTHER

## 2022-09-02 RX ORDER — LISINOPRIL AND HYDROCHLOROTHIAZIDE 12.5; 1 MG/1; MG/1
TABLET ORAL
Qty: 30 TABLET | Refills: 0 | Status: SHIPPED | OUTPATIENT
Start: 2022-09-02 | End: 2022-09-20 | Stop reason: SDUPTHER

## 2022-09-20 ENCOUNTER — OFFICE VISIT (OUTPATIENT)
Dept: PRIMARY CARE CLINIC | Age: 52
End: 2022-09-20
Payer: COMMERCIAL

## 2022-09-20 VITALS
HEART RATE: 69 BPM | OXYGEN SATURATION: 97 % | SYSTOLIC BLOOD PRESSURE: 134 MMHG | WEIGHT: 259 LBS | BODY MASS INDEX: 47.37 KG/M2 | DIASTOLIC BLOOD PRESSURE: 80 MMHG

## 2022-09-20 DIAGNOSIS — E78.2 MIXED HYPERLIPIDEMIA: ICD-10-CM

## 2022-09-20 DIAGNOSIS — Z23 NEED FOR INFLUENZA VACCINATION: ICD-10-CM

## 2022-09-20 DIAGNOSIS — R55 SYNCOPE, UNSPECIFIED SYNCOPE TYPE: ICD-10-CM

## 2022-09-20 DIAGNOSIS — I10 ESSENTIAL HYPERTENSION: ICD-10-CM

## 2022-09-20 DIAGNOSIS — J30.9 ALLERGIC RHINITIS, UNSPECIFIED SEASONALITY, UNSPECIFIED TRIGGER: ICD-10-CM

## 2022-09-20 DIAGNOSIS — K21.9 GASTROESOPHAGEAL REFLUX DISEASE, UNSPECIFIED WHETHER ESOPHAGITIS PRESENT: ICD-10-CM

## 2022-09-20 DIAGNOSIS — R73.03 PREDIABETES: ICD-10-CM

## 2022-09-20 DIAGNOSIS — F41.9 ANXIETY: ICD-10-CM

## 2022-09-20 DIAGNOSIS — I10 ESSENTIAL HYPERTENSION: Primary | ICD-10-CM

## 2022-09-20 LAB
ANION GAP SERPL CALCULATED.3IONS-SCNC: 12 MMOL/L (ref 3–16)
BUN BLDV-MCNC: 14 MG/DL (ref 7–20)
CALCIUM SERPL-MCNC: 9.1 MG/DL (ref 8.3–10.6)
CHLORIDE BLD-SCNC: 100 MMOL/L (ref 99–110)
CO2: 29 MMOL/L (ref 21–32)
CREAT SERPL-MCNC: 0.8 MG/DL (ref 0.6–1.1)
GFR AFRICAN AMERICAN: >60
GFR NON-AFRICAN AMERICAN: >60
GLUCOSE BLD-MCNC: 133 MG/DL (ref 70–99)
HCT VFR BLD CALC: 35.8 % (ref 36–48)
HEMOGLOBIN: 11.9 G/DL (ref 12–16)
MCH RBC QN AUTO: 26.2 PG (ref 26–34)
MCHC RBC AUTO-ENTMCNC: 33.3 G/DL (ref 31–36)
MCV RBC AUTO: 78.7 FL (ref 80–100)
PDW BLD-RTO: 14.2 % (ref 12.4–15.4)
PLATELET # BLD: 394 K/UL (ref 135–450)
PMV BLD AUTO: 7.2 FL (ref 5–10.5)
POTASSIUM SERPL-SCNC: 3.8 MMOL/L (ref 3.5–5.1)
RBC # BLD: 4.55 M/UL (ref 4–5.2)
SODIUM BLD-SCNC: 141 MMOL/L (ref 136–145)
WBC # BLD: 7.2 K/UL (ref 4–11)

## 2022-09-20 PROCEDURE — 99214 OFFICE O/P EST MOD 30 MIN: CPT | Performed by: INTERNAL MEDICINE

## 2022-09-20 PROCEDURE — 90674 CCIIV4 VAC NO PRSV 0.5 ML IM: CPT | Performed by: INTERNAL MEDICINE

## 2022-09-20 PROCEDURE — 90471 IMMUNIZATION ADMIN: CPT | Performed by: INTERNAL MEDICINE

## 2022-09-20 RX ORDER — LISINOPRIL AND HYDROCHLOROTHIAZIDE 12.5; 1 MG/1; MG/1
TABLET ORAL
Qty: 90 TABLET | Refills: 0 | Status: SHIPPED | OUTPATIENT
Start: 2022-09-20

## 2022-09-20 RX ORDER — FENOFIBRATE 145 MG/1
TABLET, COATED ORAL
Qty: 90 TABLET | Refills: 0 | Status: SHIPPED | OUTPATIENT
Start: 2022-09-20

## 2022-09-20 NOTE — PROGRESS NOTES
Mackenzie Dickson   Date ofBirth:  1970    Date of Visit:  9/20/2022    Chief Complaint   Patient presents with    Anxiety    Hypertension    Cholesterol Problem    Other     prediabetes    Gastroesophageal Reflux    Allergic Rhinitis     Loss of Consciousness     August 27, 2022. HPI  Patient has Hypertension. Patient takes Lisinopril-HCTZ 10-12.5mg once daily. Patient decreases salt. Patient does not exercise. Patient has anxiety. Patient takes Sertraline 50mg once daily. Patient states her anxiety has been fine. Patient has Hyperlipidemia. Patient takes Fenofibrate 145mg once daily. Patient states she decreases fat and cholesterol somewhat. Patient has Prediabetes. Patient states she decreases carbohydrates occasionally. Patient has GERD. Patient takes Protonix 40mg 2 times per day. Patient denies heartburn and reflux. Patient drinks 2 Diet Pepsi's per day. Patient doesn't eat spicy food. Patient occasionally eats tomato based food. Patient has Allergic Rhinitis. Patient takes Flonase nasal spray and generic Claritin as needed. Patient denies symptoms. Patient states she had loss of consciousness on 8/27/22 at [facility]. Patient states she working outside and got too hot and fainted. Patient states she doesn't think she was out too long. Patient states she was standing and feeling too hot and then was on the ground. Patient states her BP was 108/80 which is low for her. Review of Systems   Constitutional:  Negative for activity change, appetite change, chills, fatigue, fever and unexpected weight change. HENT:  Negative for congestion, ear pain, postnasal drip, rhinorrhea, sinus pressure, sinus pain, sneezing, sore throat and trouble swallowing. Eyes:  Negative for visual disturbance. Respiratory:  Negative for cough, chest tightness, shortness of breath and wheezing. Cardiovascular:  Negative for chest pain, palpitations and leg swelling. Gastrointestinal:  Negative for abdominal pain, blood in stool, constipation, diarrhea, nausea and vomiting. Genitourinary:  Negative for dysuria, frequency, hematuria and urgency. Musculoskeletal:  Negative for arthralgias and myalgias. Skin:  Negative for rash and wound. Neurological:  Positive for syncope. Negative for dizziness, tremors, weakness, light-headedness, numbness and headaches. Psychiatric/Behavioral:  Negative for decreased concentration, dysphoric mood and sleep disturbance. The patient is nervous/anxious. Allergies   Allergen Reactions    Sulfa Antibiotics Rash     Outpatient Medications Marked as Taking for the 9/20/22 encounter (Office Visit) with Zulema Pena MD   Medication Sig Dispense Refill    fenofibrate (TRICOR) 145 MG tablet TAKE ONE TABLET BY MOUTH DAILY 90 tablet 0    lisinopril-hydroCHLOROthiazide (PRINZIDE;ZESTORETIC) 10-12.5 MG per tablet TAKE ONE TABLET BY MOUTH DAILY 90 tablet 0    sertraline (ZOLOFT) 50 MG tablet TAKE ONE TABLET BY MOUTH DAILY 90 tablet 0    promethazine (PHENERGAN) 25 MG tablet Take 1 tablet by mouth every 6 hours as needed for Nausea 12 tablet 0    levothyroxine (SYNTHROID) 200 MCG tablet TAKE ONE TABLET BY MOUTH DAILY 30 tablet 2    calcitRIOL (ROCALTROL) 0.25 MCG capsule TAKE ONE CAPSULE BY MOUTH TWICE A DAY 60 capsule 1    magnesium (MAGNESIUM-OXIDE) 250 MG TABS tablet Take 1 tablet by mouth 2 times daily 60 tablet 1    Vitamin D, Ergocalciferol, 50 MCG (2000 UT) CAPS 2 caps daily 30 capsule 0    Calcium Carbonate Antacid (TUMS ULTRA 1000) 1000 MG CHEW 1 tablet twice daily (Patient taking differently: 1 tablet twice daily prn) 60 tablet 0    Acetaminophen (TYLENOL ARTHRITIS PAIN PO) Take by mouth PRN      Multiple Vitamins-Minerals (THERAPEUTIC MULTIVITAMIN-MINERALS) tablet Take 1 tablet by mouth daily      fluticasone (FLONASE) 50 MCG/ACT nasal spray 2 sprays by Nasal route daily.  1 Bottle 3         Vitals:    09/20/22 1517   BP: 134/80   Pulse: 69   SpO2: 97%   Weight: 259 lb (117.5 kg)     Body mass index is 47.37 kg/m². Physical Exam  Nursing note reviewed. Constitutional:       General: She is not in acute distress. Appearance: Normal appearance. She is well-developed. HENT:      Mouth/Throat:      Pharynx: Oropharynx is clear. Eyes:      General: Lids are normal.      Extraocular Movements: Extraocular movements intact. Conjunctiva/sclera: Conjunctivae normal.      Pupils: Pupils are equal, round, and reactive to light. Neck:      Thyroid: No thyromegaly. Vascular: No carotid bruit. Cardiovascular:      Rate and Rhythm: Normal rate and regular rhythm. Heart sounds: Normal heart sounds, S1 normal and S2 normal. No murmur heard. No friction rub. No gallop. Pulmonary:      Effort: Pulmonary effort is normal. No respiratory distress. Breath sounds: Normal breath sounds. No wheezing, rhonchi or rales. Abdominal:      General: Bowel sounds are normal. There is no distension. Palpations: Abdomen is soft. Tenderness: no abdominal tenderness   Musculoskeletal:      Cervical back: Neck supple. Right lower leg: No edema. Left lower leg: No edema. Lymphadenopathy:      Head:      Right side of head: No submandibular adenopathy. Left side of head: No submandibular adenopathy. Neurological:      Mental Status: She is alert and oriented to person, place, and time. Psychiatric:         Mood and Affect: Mood normal.         No results found for this visit on 09/20/22.   Lab Review   Office Visit on 08/11/2022   Component Date Value    Color, UA 08/11/2022 yellow     Clarity, UA 08/11/2022 clear     Glucose, UA POC 08/11/2022 neg     Bilirubin, UA 08/11/2022 neg     Ketones, UA 08/11/2022 neg     Spec Grav, UA 08/11/2022 1.025     Blood, UA POC 08/11/2022 neg     pH, UA 08/11/2022 5.5     Protein, UA POC 08/11/2022 neg     Urobilinogen, UA 08/11/2022 0.2     Leukocytes, UA 08/11/2022 trace     Nitrite, UA 08/11/2022 neg     Urine Culture, Routine 08/11/2022 No growth at 18 to 36 hours    Orders Only on 07/21/2022   Component Date Value    Cholesterol, Total 07/21/2022 177     Triglycerides 07/21/2022 239 (A)     HDL 07/21/2022 24 (A)     LDL Calculated 07/21/2022 105 (A)     VLDL Cholesterol Calcula* 07/21/2022 48     Hemoglobin A1C 07/21/2022 6.3     eAG 07/21/2022 134.1     TSH 07/21/2022 0.63     Anti-Thyroglob Abs 07/21/2022 17     Thyroglobulin by LC-MS/M* 07/21/2022 <0.5 (A)     T4 Free 07/21/2022 2.2 (A)     T3, Free 07/21/2022 2.9     Sodium 07/21/2022 143     Potassium 07/21/2022 4.2     Chloride 07/21/2022 103     CO2 07/21/2022 27     Anion Gap 07/21/2022 13     Glucose 07/21/2022 99     BUN 07/21/2022 12     Creatinine 07/21/2022 0.7     GFR Non- 07/21/2022 >60     GFR  07/21/2022 >60     Calcium 07/21/2022 8.3     Total Protein 07/21/2022 7.0     Albumin 07/21/2022 4.3     Albumin/Globulin Ratio 07/21/2022 1.6     Total Bilirubin 07/21/2022 0.3     Alkaline Phosphatase 07/21/2022 74     ALT 07/21/2022 29     AST 07/21/2022 33     Vit D, 25-Hydroxy 07/21/2022 60.4     Magnesium 07/21/2022 1.70 (A)     Phosphorus 07/21/2022 2.8     PTH 07/21/2022 16.4     Calcium, Ionized 07/21/2022 1.10 (A)     Calcium,Ion ph 7.4 07/21/2022 1.11    Orders Only on 04/15/2022   Component Date Value    TSH, 3RD GENERATION 04/15/2022 40.53 (A)     Thyroglobulin 04/15/2022 <0.1 (A)     Thyroglobulin Ab 04/15/2022 1.2          Assessment/Plan     1. Essential hypertension  - stable   - Basic Metabolic Panel; Future  - Continue lisinopril-hydroCHLOROthiazide (PRINZIDE;ZESTORETIC) 10-12.5 MG per tablet; TAKE ONE TABLET BY MOUTH DAILY  Dispense: 90 tablet; Refill: 0  -Low sodium diet  -Regular aerobic exercise    2. Anxiety  - stable  - Continue sertraline (ZOLOFT) 50 MG tablet; TAKE ONE TABLET BY MOUTH DAILY  Dispense: 90 tablet; Refill: 0    3.  Mixed hyperlipidemia  - triglycerides are high  - Continue fenofibrate (TRICOR) 145 MG tablet; TAKE ONE TABLET BY MOUTH DAILY  Dispense: 90 tablet; Refill: 0  -Low fat, low cholesterol diet  -Regular aerobic exercise    4. Prediabetes  -stable  -Low carbohydrate diet  -Regular aerobic exercise    5. Gastroesophageal reflux disease, unspecified whether esophagitis present  -stable  -Continue Protonix  -Decrease caffeine, avoid spicy foods, avoid tomato based foods  -Eat small meals instead of large meals  -Wait 2-3 hours after eating before lying down     6. Allergic rhinitis, unspecified seasonality, unspecified trigger  -stable  -Continue Flonase nasal spray  -continue Loratadine 10mg once daily    7. Syncope, unspecified syncope type  - with getting overheated at [facility]; Future  - Basic Metabolic Panel; Future    8. Need for influenza vaccination  - Influenza, FLUCELVAX, (age 10 mo+), IM, Preservative Free, 0.5 mL given      Discussed medications with patient, who voiced understanding of their use and indications. All questions answered.       Return in about 3 months (around 12/20/2022) for annual physical exam.

## 2022-10-04 PROBLEM — I10 ESSENTIAL HYPERTENSION: Status: ACTIVE | Noted: 2022-10-04

## 2022-10-04 PROBLEM — R55 SYNCOPE: Status: ACTIVE | Noted: 2022-10-04

## 2022-10-04 ASSESSMENT — ENCOUNTER SYMPTOMS
WHEEZING: 0
SINUS PRESSURE: 0
VOMITING: 0
NAUSEA: 0
CHEST TIGHTNESS: 0
DIARRHEA: 0
CONSTIPATION: 0
RHINORRHEA: 0
ABDOMINAL PAIN: 0
COUGH: 0
BLOOD IN STOOL: 0
SORE THROAT: 0
TROUBLE SWALLOWING: 0
SHORTNESS OF BREATH: 0
SINUS PAIN: 0

## 2022-10-26 DIAGNOSIS — E89.0 HYPOTHYROIDISM, POSTSURGICAL: ICD-10-CM

## 2022-10-26 DIAGNOSIS — C73 THYROID CANCER (HCC): ICD-10-CM

## 2022-10-26 DIAGNOSIS — E21.0 HYPERPARATHYROIDISM, PRIMARY (HCC): ICD-10-CM

## 2022-10-26 DIAGNOSIS — E83.42 HYPOMAGNESEMIA: ICD-10-CM

## 2022-10-26 DIAGNOSIS — E89.2 POSTSURGICAL HYPOPARATHYROIDISM (HCC): ICD-10-CM

## 2022-10-26 LAB
A/G RATIO: 2 (ref 1.1–2.2)
ALBUMIN SERPL-MCNC: 4.8 G/DL (ref 3.4–5)
ALP BLD-CCNC: 82 U/L (ref 40–129)
ALT SERPL-CCNC: 25 U/L (ref 10–40)
ANION GAP SERPL CALCULATED.3IONS-SCNC: 19 MMOL/L (ref 3–16)
ANTI-THYROGLOB ABS: 13 IU/ML
AST SERPL-CCNC: 27 U/L (ref 15–37)
BILIRUB SERPL-MCNC: <0.2 MG/DL (ref 0–1)
BUN BLDV-MCNC: 16 MG/DL (ref 7–20)
CALCIUM SERPL-MCNC: 9.3 MG/DL (ref 8.3–10.6)
CHLORIDE BLD-SCNC: 99 MMOL/L (ref 99–110)
CO2: 24 MMOL/L (ref 21–32)
CREAT SERPL-MCNC: 0.8 MG/DL (ref 0.6–1.1)
GFR SERPL CREATININE-BSD FRML MDRD: >60 ML/MIN/{1.73_M2}
GLUCOSE BLD-MCNC: 116 MG/DL (ref 70–99)
MAGNESIUM: 1.8 MG/DL (ref 1.8–2.4)
PARATHYROID HORMONE INTACT: 14.8 PG/ML (ref 14–72)
PHOSPHORUS: 3.1 MG/DL (ref 2.5–4.9)
POTASSIUM SERPL-SCNC: 3.8 MMOL/L (ref 3.5–5.1)
SODIUM BLD-SCNC: 142 MMOL/L (ref 136–145)
T3 FREE: 2.9 PG/ML (ref 2.3–4.2)
T4 FREE: 2.1 NG/DL (ref 0.9–1.8)
TOTAL PROTEIN: 7.2 G/DL (ref 6.4–8.2)
TSH SERPL DL<=0.05 MIU/L-ACNC: 0.55 UIU/ML (ref 0.27–4.2)
VITAMIN D 25-HYDROXY: 62.8 NG/ML

## 2022-10-28 LAB
CALCIUM IONIZED, CALC AT PH 7.4: 1.17 MMOL/L (ref 1.11–1.3)
CALCIUM IONIZED: 1.16 MMOL/L (ref 1.11–1.3)

## 2022-10-31 DIAGNOSIS — E89.2 POSTSURGICAL HYPOPARATHYROIDISM (HCC): ICD-10-CM

## 2022-10-31 RX ORDER — LEVOTHYROXINE SODIUM 0.2 MG/1
TABLET ORAL
Qty: 30 TABLET | Refills: 0 | Status: SHIPPED | OUTPATIENT
Start: 2022-10-31 | End: 2022-11-03 | Stop reason: SDUPTHER

## 2022-11-02 LAB — THYROGLOBULIN BY LC-MS/MS, SERUM/PLASMA: <0.5 NG/ML (ref 1.3–31.8)

## 2022-11-03 ENCOUNTER — OFFICE VISIT (OUTPATIENT)
Dept: ENDOCRINOLOGY | Age: 52
End: 2022-11-03
Payer: COMMERCIAL

## 2022-11-03 VITALS
BODY MASS INDEX: 47.29 KG/M2 | TEMPERATURE: 98 F | HEIGHT: 62 IN | HEART RATE: 89 BPM | SYSTOLIC BLOOD PRESSURE: 118 MMHG | OXYGEN SATURATION: 96 % | DIASTOLIC BLOOD PRESSURE: 64 MMHG | WEIGHT: 257 LBS | RESPIRATION RATE: 14 BRPM

## 2022-11-03 DIAGNOSIS — E28.319 PREMATURE MENOPAUSE: ICD-10-CM

## 2022-11-03 DIAGNOSIS — E83.42 HYPOMAGNESEMIA: ICD-10-CM

## 2022-11-03 DIAGNOSIS — C73 THYROID CANCER (HCC): ICD-10-CM

## 2022-11-03 DIAGNOSIS — R73.03 PREDIABETES: ICD-10-CM

## 2022-11-03 DIAGNOSIS — E89.0 HYPOTHYROIDISM, POSTSURGICAL: ICD-10-CM

## 2022-11-03 DIAGNOSIS — E21.0 HYPERPARATHYROIDISM, PRIMARY (HCC): ICD-10-CM

## 2022-11-03 DIAGNOSIS — E66.01 CLASS 3 SEVERE OBESITY WITH SERIOUS COMORBIDITY AND BODY MASS INDEX (BMI) OF 45.0 TO 49.9 IN ADULT, UNSPECIFIED OBESITY TYPE (HCC): ICD-10-CM

## 2022-11-03 DIAGNOSIS — E78.2 MIXED HYPERLIPIDEMIA: ICD-10-CM

## 2022-11-03 DIAGNOSIS — E89.0 POSTOPERATIVE HYPOTHYROIDISM: ICD-10-CM

## 2022-11-03 DIAGNOSIS — E06.3 HASHIMOTO'S THYROIDITIS: ICD-10-CM

## 2022-11-03 DIAGNOSIS — E89.2 POSTSURGICAL HYPOPARATHYROIDISM (HCC): Primary | ICD-10-CM

## 2022-11-03 PROCEDURE — 99214 OFFICE O/P EST MOD 30 MIN: CPT | Performed by: INTERNAL MEDICINE

## 2022-11-03 PROCEDURE — 3078F DIAST BP <80 MM HG: CPT | Performed by: INTERNAL MEDICINE

## 2022-11-03 PROCEDURE — 3074F SYST BP LT 130 MM HG: CPT | Performed by: INTERNAL MEDICINE

## 2022-11-03 RX ORDER — LEVOTHYROXINE SODIUM 0.2 MG/1
TABLET ORAL
Qty: 90 TABLET | Refills: 1 | Status: SHIPPED | OUTPATIENT
Start: 2022-11-03

## 2022-11-03 NOTE — PROGRESS NOTES
SUBJECTIVE:  Roxanne Rivera is a 45 y.o. female who is being evaluated for hyperparathyroidism. 1. Hyperparathyroidism, primary   This started in 2020. Patient was diagnosed with hypercalcemia. The problem has been gradually worsening. Patient started medication in 2020. Currently patient is on: N/A. Misses  N/A doses a month. Current complaints: fatigue, easy gets hot  No history of kidney stones  No persistent constipation, aches, depression  Subtotal parathyroidectomy 10/16/2020, right superior, left superior, left inferior    2. Thyroid cancer  EXAM: NM THYROID CARCINOMA ABLATION        INDICATION: Malignant neoplasm of thyroid gland (CMS Dx) 77-year-old female with a history of    papillary thyroid cancer status post thyroidectomy here for postthyroidectomy radioablation. RADIOPHARMACEUTICAL:  I-131 capsule 52.1 mCi PO          10/16/2020  Thyroid, total thyroidectomy:  -  Multifocal papillary thyroid carcinoma, classic type, involving the left  lobe (1.1 x 0.8 x 0.7 cm) and right lobe (3 mm). -  Negative for extrathyroidal extension.  -  Background thyroid with chronic lymphocytic thyroiditis and nodular  thyroid hyperplasia with adenomatoid nodules. -  Surgical resection margins are free of malignancy. History of obstructive symptoms: difficulty swallowing No, changes in voice/hoarseness No.  History of radiation to patient's neck: No  Resent iodine exposure: No  Family history includes no thyroid abnormalities. Family history of thyroid cancer: No    3. Premature menopause  Menopause since age 38  Was on Estrogen    4. Obesity  He is moderately healthy, active    5. Hashimoto's thyroiditis  Has fatigue    6. Osteopenia  Calcium in diet  No bone pain    7. Postsurgical hypoparathyroidism  Has numbness, tingling in hands. Not in face anymore, had after surgery    8. Hypomagnesia  Has fatigue    9. Prediabetes  Mother had sugar problem    10.   Postsurgical hypothyroidism  Has fatigue    11. Hyperlipidemia  No muscle pain       FINDINGS:         Whole body images demonstrate the expected normal concentration of the radioiodine-131 in the    head, abdomen, pelvis and extremities. No abnormal areas of increased radioiodine are seen. Previously demonstrated focal radioiodine uptake at the anterior neck has resolved study. The 48-hour uptake at the level of the neck is 0.2%. Blood samples for thyroglobulin and TSH determination were also obtained. Loreatha Press 2   IMPRESSION:   Normal whole body images without abnormal radioiodine concentrating tissue. EXAM: NM THYROID CARCINOMA ABLATION        INDICATION: Malignant neoplasm of thyroid gland (CMS Dx) 50-year-old female with a history of    papillary thyroid cancer status post thyroidectomy here for postthyroidectomy radioablation. RADIOPHARMACEUTICAL:  I-131 capsule 52.1 mCi PO            52.1   IMPRESSION:       Written home instructions, including radiation safety precautions, were provided and reviewed. Informed, written consent was obtained. All of her questions were answered to her satisfaction. She was treated with radioiodine-131 by mouth. She will return next week for a    post-treatment scan. Approved by Rogers West MD on 1/29/2021 2:30 PM EST       I have personally reviewed the images and I agree with this report. Report Verified by: Sammy Flood MD at 1/29/2021 3:12 PM EST   52.1           10 year risk for major osteoporotic fracture is 3.8% and risk of a hip    fracture is 0.2%. EXAM: NM THYROID METASTASES WHOLE BODY       INDICATION: This patient with thyroid cancer is referred for imaging following an ablative dose    of iodine-131. TECHNICAL: Whole body thyroid carcinoma metastasis images with special attention to the neck    and chest were obtained 5 days following the administration of an ablative dose of iodine-131.        COMPARISON: Pretreatment study of 1/29/2021. FINDINGS:    Whole body images again demonstrate a focus of uptake in the anterior neck. No additional areas    of radioiodine accumulation are identified outside of the neck. IMPRESSION:   1. Radioactive iodine uptake in the anterior neck. No abnormal uptake outside the neck. Report Verified by: Dirk England MD at 2/3/2021 12:04 PM EST                 EXAMINATION:    BONE DENSITOMETRY         7/31/2020 8:26 am         TECHNIQUE:    A bone density DEXA scan was performed of the lumbar spine, bilateral hips    and left forearm on a Hologic system. COMPARISON:    None. HISTORY:    ORDERING SYSTEM PROVIDED HISTORY: Hyperparathyroidism, primary [facility])    TECHNOLOGIST PROVIDED HISTORY:    Reason for exam:->Hyperparathyroidism. Please include non-dominant forearm. Is the patient pregnant?->No         FINDINGS:    LEFT FOREARM:         The BMD of the middle third of the radius of the distal forearm equals 0.64    g/cm2. The T- and Z-scores are -0.9 and -0.2 standard deviations from the    mean. This is within the normal range by WHO criteria. LUMBAR SPINE:         The bone mineral density in the lumbar spine including the L1-L4 levels is    measured at 0.89 g/cm2, which corresponds to a T-score of -1.4 and a Z-score    of -0.6. This is within the osteopenia range by WHO criteria. LEFT HIP:         The bone mineral density in the total hip is measured at 0.92 g/cm2    corresponding to a T-score of -0.2 and a Z-score of 0.3. This is within the    normal range by WHO criteria. The bone mineral density of the femoral neck is measured at 0.75 g/cm2    corresponding to a T-score of -0.9 and a Z-score of -0.1. This is within the    normal range by WHO criteria. RIGHT HIP:         The bone mineral density in the total hip is measured at 0.90 g/cm2    corresponding to a T-score of -0.3 and a Z-score of 0.2.   This is within the    normal range by WHO criteria. The bone mineral density of the femoral neck is measured at 0.71 g/cm2    corresponding to a T-score of -1.2 and a Z-score of -0.5. This is within the    osteopenia range by WHO criteria. 10 year risk for major osteoporotic fracture is 3.8% and risk of a hip    fracture is 0.2%. Impression    Osteopenia by WHO criteria. EXAM: NM THYROID METASTASES WHOLE BODY   EXAM: NM THYROID METAS UPTAKE ADDL     INDICATION: Thyroid cancer (CMS Dx)     COMPARISON: Posttreatment I-131 scan dated 2/3/2021. RADIOPHARMACEUTICAL: Recombinant TSH 0.9 mg IM x 2, I-131 capsule 1.96 mCi PO.     TECHNICAL: A whole body thyroid cancer workup was performed using the recombinant TSH protocol. The patient was pretreated with recombinant TSH 2 days prior to the administration of the radiopharmaceutical Whole body thyroid carcinoma metastases imaging was performed several days thereafter. A quantitative thyroid carcinoma uptake was also performed. FINDINGS:      Whole body images demonstrate the expected normal concentration of the radioiodine-131 in the head, abdomen, pelvis and extremities. No abnormal areas of increased radioiodine are seen. Previously demonstrated focal radioiodine uptake at the anterior neck has resolved study. The 48-hour uptake at the level of the neck is 0.2%     Ref Range & Units 3 mo ago Comments   Thyroglobulin (Rhoda) 1.6 - 50.0 ng/mL <0.1 Low   Biotin megadosing (consumption >300 mcg/day) may falsely decrease thyroglobulin. When indicated, discontinue megadosing for 1 week and repeat testing.    Thyroglobulin Antibody (Rhoda) 0.0 - 4.0 IU/mL 1.2           Past Medical History:   Diagnosis Date    Anxiety     Endometriosis     Hyperlipidemia     Hypertension      Patient Active Problem List    Diagnosis Date Noted    Syncope 10/04/2022    Essential hypertension 10/04/2022    Urinary tract infection without hematuria 08/23/2022 Palpitations 12/07/2021    Class 3 severe obesity with body mass index (BMI) of 45.0 to 49.9 in adult [facility]) 09/26/2021    Prediabetes 03/09/2021    Mixed hyperlipidemia 03/09/2021    Snoring 03/09/2021    Hypothyroidism, postsurgical 12/08/2020    Postsurgical hypoparathyroidism ([facility] 12/08/2020    Hypomagnesemia 12/08/2020    [facility] 12/08/2020    Hashimoto's thyroiditis 08/06/2020    Osteopenia 08/01/2020    Class 3 severe obesity with body mass index (BMI) of 40.0 to 44.9 in adult [facility]) 07/07/2020    Hyperparathyroidism, primary ([facility] 07/07/2020    Premature menopause 07/07/2020    Epigastric abdominal pain 06/12/2020    Nausea 06/12/2020    Low back pain without sciatica 06/12/2020    Diarrhea 06/12/2020    Allergic rhinitis 03/20/2020    Pain of right middle finger 03/20/2020    Anxiety 05/10/2018    Gastroesophageal reflux disease 10/30/2016    Surgical menopause on hormone replacement therapy 12/17/2015    Hypertriglyceridemia 12/17/2015    HTN (hypertension) 07/01/2015    Colon polyp 03/18/2015     Past Surgical History:   Procedure Laterality Date    ACHILLES TENDON SURGERY Right 6/24/2019    RIGHT ACHILLES TENDON RELEASE AND HEEL EXOSTECTOMY performed by Daxa Daniel DPM at [facility]  2004    [address]    COLONOSCOPY  03/18/2015    Brien GOMEZ    FOOT SURGERY Left 2013    left foot recontstructon    HYSTERECTOMY (CERVIX STATUS UNKNOWN)  2013    complete - vaginal hyst    THYROIDECTOMY      TUBAL LIGATION  2013    with ablation    US THYROID BIOPSY  7/31/2020    US THYROID BIOPSY [facility] ULTRASOUND     Family History   Problem Relation Age of Onset    Diabetes Mother     High Blood Pressure Mother     Stroke Mother     High Blood Pressure Father     Cancer Maternal Aunt 58        breast    Cancer Maternal Grandmother 72        colon cancer    Cancer Paternal Grandmother         lung     Social History     Socioeconomic History    Marital status:      Spouse name: None    Number of children: None    Years of education: None    Highest education level: None   Tobacco Use    Smoking status: Never    Smokeless tobacco: Never   Vaping Use    Vaping Use: Never used   Substance and Sexual Activity    Alcohol use: Yes     Alcohol/week: 2.0 standard drinks     Types: 2 Cans of beer per week     Comment: social    Drug use: No    Sexual activity: Yes     Partners: Male     Social Determinants of Health     Financial Resource Strain: Low Risk     Difficulty of Paying Living Expenses: Not hard at all   Food Insecurity: No Food Insecurity    Worried About [address] [place] [address] in the Last Year: Never true    Ran Out of Food in the Last Year: Never true     Current Outpatient Medications   Medication Sig Dispense Refill    levothyroxine (SYNTHROID) 200 MCG tablet TAKE ONE TABLET BY MOUTH DAILY 90 tablet 1    fenofibrate (TRICOR) 145 MG tablet TAKE ONE TABLET BY MOUTH DAILY 90 tablet 0    lisinopril-hydroCHLOROthiazide (PRINZIDE;ZESTORETIC) 10-12.5 MG per tablet TAKE ONE TABLET BY MOUTH DAILY 90 tablet 0    sertraline (ZOLOFT) 50 MG tablet TAKE ONE TABLET BY MOUTH DAILY 90 tablet 0    promethazine (PHENERGAN) 25 MG tablet Take 1 tablet by mouth every 6 hours as needed for Nausea 12 tablet 0    calcitRIOL (ROCALTROL) 0.25 MCG capsule TAKE ONE CAPSULE BY MOUTH TWICE A DAY 60 capsule 1    magnesium (MAGNESIUM-OXIDE) 250 MG TABS tablet Take 1 tablet by mouth 2 times daily 60 tablet 1    Vitamin D, Ergocalciferol, 50 MCG (2000 UT) CAPS 2 caps daily 30 capsule 0    Calcium Carbonate Antacid (TUMS ULTRA 1000) 1000 MG CHEW 1 tablet twice daily (Patient taking differently: 1 tablet twice daily prn) 60 tablet 0    Acetaminophen (TYLENOL ARTHRITIS PAIN PO) Take by mouth PRN      Multiple Vitamins-Minerals (THERAPEUTIC MULTIVITAMIN-MINERALS) tablet Take 1 tablet by mouth daily      fluticasone (FLONASE) 50 MCG/ACT nasal spray 2 sprays by Nasal route daily.  1 Bottle 3 No current facility-administered medications for this visit.      Allergies   Allergen Reactions    Sulfa Antibiotics Rash     Family Status   Relation Name Status    Mother Ese Alonzo    Father Ximena Garza (Not Specified)    MGTREMAINE Shay (Not Specified)    Sister  Alive    Brother  Alive    Kandace Donato (Not Specified)    Sister   at age 32        Down Syndrome       Review of Systems:  Constitutional: has fatigue, no fever, no recent weight gain, no recent weight loss, no changes in appetite  Eyes: no eye pain, no change in vision, no eye redness, no eye irritation, no double vision  Ears, nose, throat: no nasal congestion, no sore throat, no earache, no decrease in hearing, no hoarseness, no dry mouth, no sinus problems, no difficulty swallowing, no neck lumps, no dental problems, no mouth sores, no ringing in ears  Pulmonary: no shortness of breath, no wheezing, no dyspnea on exertion, no cough  Cardiovascular: no chest pain, no lower extremity edema, no orthopnea, no intermittent leg claudication, no palpitations  Gastrointestinal: has abdominal pain, no nausea, no vomiting, has diarrhea, has constipation, no dysphagia, has heartburn, no bloating  Genitourinary: no dysuria, no urinary incontinence, no urinary hesitancy, no urinary frequency, no feelings of urinary urgency, no nocturia  Musculoskeletal: no joint swelling, no joint stiffness, has joint pain, has muscle cramps, no muscle pain, no bone pain  Integument/Breast: no hair loss, no skin rashes, no skin lesions, no itching, no dry skin  Neurological: no numbness, no tingling, no weakness, no confusion, no headaches, no dizziness, no fainting, no tremors, no decrease in memory, no balance problems  Psychiatric: no anxiety, no depression, no insomnia  Hematologic/Lymphatic: no tendency for easy bleeding, no swollen lymph nodes, no tendency for easy bruising  Immunology: has seasonal allergies, no frequent infections, no frequent illnesses  Endocrine: has temperature intolerance    /64   Pulse 89   Temp 98 °F (36.7 °C)   Resp 14   Ht 5' 2\" (1.575 m)   Wt 257 lb (116.6 kg)   SpO2 96%   BMI 47.01 kg/m²    Wt Readings from Last 3 Encounters:   11/03/22 257 lb (116.6 kg)   09/20/22 259 lb (117.5 kg)   08/11/22 259 lb (117.5 kg)     Body mass index is 47.01 kg/m².     OBJECTIVE:  Constitutional: no acute distress, well appearing and well nourished  Psychiatric: oriented to person, place and time, judgement and insight and normal, recent and remote memory and intact and mood and affect are normal  Skin: skin and subcutaneous tissue is normal without mass, normal turgor  Head and Face: examination of head and face revealed no abnormalities  Eyes: no lid or conjunctival swelling, erythema or discharge, pupils are normal, equal, round, reactive to light  Ears/Nose: external inspection of ears and nose revealed no abnormalities, hearing is grossly normal  Oropharynx/Mouth/Face: lips, tongue and gums are normal with no lesions, the voice quality was normal  Neck: neck is supple and symmetric, with midline trachea and no masses, thyroid is absent, no palpable masses  Lymphatics: normal cervical lymph nodes, normal supraclavicular nodes  Pulmonary: no increased work of breathing or signs of respiratory distress, lungs are clear to auscultation  Cardiovascular: normal heart rate and rhythm, normal S1 and S2, no murmurs and pedal pulses and 2+ bilaterally, No edema  Abdomen: abdomen is soft, non-tender with no masses  Musculoskeletal: normal gait and station and exam of the digits and nails are normal  Neurological: normal coordination and normal general cortical function      Lab Review:    Lab Results   Component Value Date/Time    WBC 7.2 09/20/2022 04:01 PM    HGB 11.9 09/20/2022 04:01 PM    HCT 35.8 09/20/2022 04:01 PM    MCV 78.7 09/20/2022 04:01 PM     09/20/2022 04:01 PM     Lab Results   Component Value Date/Time     10/26/2022 03:21 PM    K 3.8 10/26/2022 03:21 PM    CL 99 10/26/2022 03:21 PM    CO2 24 10/26/2022 03:21 PM    BUN 16 10/26/2022 03:21 PM    CREATININE 0.8 10/26/2022 03:21 PM    GLUCOSE 116 10/26/2022 03:21 PM    CALCIUM 9.3 10/26/2022 03:21 PM    PROT 7.2 10/26/2022 03:21 PM    LABALBU 4.8 10/26/2022 03:21 PM    BILITOT <0.2 10/26/2022 03:21 PM    ALKPHOS 82 10/26/2022 03:21 PM    AST 27 10/26/2022 03:21 PM    ALT 25 10/26/2022 03:21 PM    LABGLOM >60 10/26/2022 03:21 PM    GFRAA >60 09/20/2022 04:01 PM    AGRATIO 2.0 10/26/2022 03:21 PM    GLOB 2.7 08/02/2021 02:07 PM     Lab Results   Component Value Date/Time    TSH 0.55 10/26/2022 03:21 PM    FT3 2.9 10/26/2022 03:21 PM     Lab Results   Component Value Date/Time    LABA1C 6.3 07/21/2022 10:59 AM     Lab Results   Component Value Date/Time    .1 07/21/2022 10:59 AM     Lab Results   Component Value Date/Time    CHOL 177 07/21/2022 10:59 AM     Lab Results   Component Value Date/Time    TRIG 239 07/21/2022 10:59 AM     Lab Results   Component Value Date/Time    HDL 24 07/21/2022 10:59 AM     Lab Results   Component Value Date/Time    LDLCALC 105 07/21/2022 10:59 AM     Lab Results   Component Value Date/Time    LABVLDL 48 07/21/2022 10:59 AM     No results found for: [facility]  Lab Results   Component Value Date/Time    LABMICR YES 06/05/2020 12:11 PM     Lab Results   Component Value Date/Time    VITD25 62.8 10/26/2022 03:21 PM        ASSESSMENT/PLAN:  1.  Hyperparathyroidism, primary   Next denis decrease Calcitriol  Calcitriol 0.25 mcg every other day  Calcium - Tums 1000 mg 0-4 a day, not every day  Vitamin D 2000 IU daily  Magnesium 250 mg daily  Calcium 8.7-8.6-9.0-8.5-8.4-8.8-8.3  Albumin 4.5-4.4-4.3  PTH 8.5-11.4-14.1-15.5-16.4-14.8  Magnesium 1.7-1.7-1.7-1.5-1.7-1.8  25 hydroxy vitamin D 41.5-47.0-44.2-37.6-46.3-60.4  TSH 2.1  Subtotal parathyroidectomy-right superior, left superior, left inferior 10/16/2020   Total thyroidectomy    - Vitamin D 25 Hydroxy; Future  - Calcium Ionized Serum; Future  - PTH, Intact; Future    2. Thyroid cancer  Thyroglobulin less than 0.5-<0.1-<0.5-<0.5  Thyroglobulin antibody 13  TSH 0.55    1/2021  RADIOPHARMACEUTICAL:  I-131 capsule 52.1 mCi PO      Total thyroidectomy 10/16/2020  [id number]  Pre-Operative Diagnosis: Multinodular goiter, hypercalcemia  Post-Operative Diagnosis:     Same  Specimen(s) Submitted:   A. Rt. superior parathyroid; B. Lt. superior  parathyroid; C. Total thyroid; D. Lt. inferior parathyroid  A. Right superior parathyroid, parathyroidectomy:  -  Hypercellular parathyroid gland (35 mg). B.  Left superior parathryoid, parathyroidectomy:  -  Enlarged, hypercellular parathyroid (190 mg). C.  Thyroid, total thyroidectomy:  -  Multifocal papillary thyroid carcinoma, classic type, involving the left  lobe (1.1 x 0.8 x 0.7 cm) and right lobe (3 mm). -  Negative for extrathyroidal extension.  -  Background thyroid with chronic lymphocytic thyroiditis and nodular  thyroid hyperplasia with adenomatoid nodules. -  Surgical resection margins are free of malignancy. -  See synoptic report for details. D.  Left inferior parathyroid, parathyroidectomy:  -  Benign parathyroid tissue (20 mg). SYNOPTIC REPORT:  Report Name: Thyroid Gland - Resection,  Version: [de-identified],   Inst#: 1  Specimen    Procedure:  Total thyroidectomy  Tumor    Tumor Focality: Multifocal    Tumor Characteristics      Tumor Site: Left lobe      Histologic Type: Papillary carcinoma, classic (usual,                       conventional)      Tumor Size: 1.1 x 0.8 x 0.7 Centimeters (cm)      Extrathyroidal Extension: Not identified      Angioinvasion (vascular invasion): Not identified      Lymphatic Invasion: Not identified      Margins: Uninvolved by carcinoma  Lymph Nodes    Regional Lymph Nodes: No lymph nodes submitted or found  Pathologic Stage Classification (pTNM, AJCC 8th Edition)    TNM Descriptors: m (multiple primary tumors) Primary Tumor (pT): pT1b    Regional Lymph Nodes (pN): pNX  Comments      Second small focus of papillary thyroid carcinoma is present in the  right lobe measuring 3 mm. - US Head Neck Soft Tissue Thyroid; Future  -Thyroglobulin  -Thyroglobulin antibody  EXAM:  NM THYROID METASTASES LIMITED   EXAM: NM THYROID METAS UPTAKE ADDL       INDICATION: Malignant neoplasm of thyroid gland (CMS Dx); This patient is referred for the    evaluation of residual thyroid tissue post thyroidectomy. RADIOPHARMACEUTICAL:  Recombinant TSH 0.9 mg IM x 2, I-123 capsule 335 uCi PO       TECHNICAL:  A limited thyroid cancer workup was performed using the recombinant TSH protocol. Recombinant TSH was administered prior to the administration of iodine-123. Imaging of the    neck and chest was performed 24 hours following radioiodine-123 administration. A quantitative    uptake was also performed at the level of the neck. COMPARISON:  None           FINDINGS:        Images demonstrate a focal area of uptake in the anterior neck. The 24 hour radioiodine uptake at the level of the neck is 0.8%. 4/15/2022  Tg <0.1, antibodies negative  Whole body images demonstrate the expected normal concentration of the radioiodine-131 in the head, abdomen, pelvis and extremities. No abnormal areas of increased radioiodine are seen. Previously demonstrated focal radioiodine uptake at the anterior neck has resolved study. The 48-hour uptake at the level of the neck is 0.2%. 3. Obesity  Diet and exercise    4. Premature menopause  Calcium and vitamin D supplementation  DEXA scan    5. Hashimoto's thyroiditis  TSH 2.34    6. Osteopenia  Calcium, vitamin D supplementation was discussed, written instructions were given how to calculate calcium in diet  Calcium, vitamin D  Counseled patient on risk fracture  10 year risk for major osteoporotic fracture is 3.8% and risk of a hip    fracture is 0.2%.       7. Postsurgical hypothyroidism  Has more palpitations  Continue levothyroxine 0.200 mg daily  Total thyroidectomy 10/16/2020  TSH 2.1-2.4-2.34-6.84-0.97-1.27-0.63  -TSH  -Free T4  -Free T3    8. Postsurgical hypoparathyroidism  1000 mg Tums bid once in a while  Calcitriol 0.25 mcg 1 tablet every other day  Vitamin D3 4000 IU   Magnesium citrate 1 caps daily  PTH 5.9-11.4  -CMP  -PTH  -Ionized calcium  -Phosphorus  -Magnesium  -25 hydroxy vitamin D    9. Hypomagnesemia  Uncontrolled  Magnesium 250 mg only taking one because of diarrhea  Magnesium 1.8-1.7-1.5-1.7  -Magnesium    10. Prediabetes  Glucose 114-104-116  HbA1C 6.2-6.3    11.   Hyperlipidemia  LDL   Triglycerides 290  HDL 26    Reviewed and/or ordered clinical lab results Yes  Reviewed and/or ordered radiology tests Yes   Reviewed and/or ordered other diagnostic tests No  Discussed test results with performing physician No  Independently reviewed image, tracing, or specimen No  Made a decision to obtain old records No  Reviewed and summarized old records Yes   Calcium elevated since 2015, later upper limit of normal, in 2020 elevated again  PTH 38  Calcium 11.1-10.5-10.2-10.1-10.8-11.3, upper limit normal 10.6  TSH 1.4  RADIOPHARMACEUTICAL:  I-131 capsule 52.1 mCi PO            Obtained history from other than patient No    Mackenzie Messer was counseled regarding symptoms of primary hyperparathyroidism, thyroid cancer, postsurgical hypothyroidism, postsurgical hypoparathyroidism, hypomagnesemia, premature menopause diagnosis, course and complications of disease if inadequately treated, side effects of medications, diagnosis, treatment options, and prognosis, risks, benefits, complications, and alternatives of treatment, labs, imaging and other studies and treatment targets and goals, thyroid cancer management, surveillance, recurrence prevention, hypoparathyroidism management, follow-up and monitoring, medication adjustments    She understands instructions and counseling. No follow-ups on file.     Electronically signed by Xi Mac MD on 11/3/2022 at 4:01 PM

## 2022-11-08 ENCOUNTER — HOSPITAL ENCOUNTER (OUTPATIENT)
Dept: WOMENS IMAGING | Age: 52
Discharge: HOME | End: 2022-11-08
Payer: COMMERCIAL

## 2022-11-08 DIAGNOSIS — Z12.31 ENCOUNTER FOR SCREENING MAMMOGRAM FOR BREAST CANCER: ICD-10-CM

## 2022-11-08 PROCEDURE — 77067 SCR MAMMO BI INCL CAD: CPT

## 2022-11-09 ENCOUNTER — OFFICE VISIT (OUTPATIENT)
Dept: PULMONOLOGY | Age: 52
End: 2022-11-09
Payer: COMMERCIAL

## 2022-11-09 VITALS
OXYGEN SATURATION: 97 % | HEIGHT: 62 IN | DIASTOLIC BLOOD PRESSURE: 78 MMHG | HEART RATE: 86 BPM | TEMPERATURE: 97.3 F | BODY MASS INDEX: 47.84 KG/M2 | WEIGHT: 260 LBS | SYSTOLIC BLOOD PRESSURE: 121 MMHG

## 2022-11-09 DIAGNOSIS — K21.9 GASTROESOPHAGEAL REFLUX DISEASE, UNSPECIFIED WHETHER ESOPHAGITIS PRESENT: Chronic | ICD-10-CM

## 2022-11-09 DIAGNOSIS — G47.10 HYPERSOMNIA: Primary | ICD-10-CM

## 2022-11-09 DIAGNOSIS — F41.9 ANXIETY: Chronic | ICD-10-CM

## 2022-11-09 DIAGNOSIS — E66.01 CLASS 3 SEVERE OBESITY DUE TO EXCESS CALORIES WITH SERIOUS COMORBIDITY AND BODY MASS INDEX (BMI) OF 45.0 TO 49.9 IN ADULT (HCC): Chronic | ICD-10-CM

## 2022-11-09 DIAGNOSIS — R06.83 SNORING: ICD-10-CM

## 2022-11-09 DIAGNOSIS — I10 ESSENTIAL HYPERTENSION: Chronic | ICD-10-CM

## 2022-11-09 PROBLEM — J30.9 ALLERGIC RHINITIS: Chronic | Status: ACTIVE | Noted: 2020-03-20

## 2022-11-09 PROBLEM — C73 THYROID CANCER (HCC): Chronic | Status: ACTIVE | Noted: 2020-12-08

## 2022-11-09 PROBLEM — E66.813 CLASS 3 SEVERE OBESITY DUE TO EXCESS CALORIES WITH SERIOUS COMORBIDITY AND BODY MASS INDEX (BMI) OF 45.0 TO 49.9 IN ADULT: Chronic | Status: ACTIVE | Noted: 2020-07-07

## 2022-11-09 PROCEDURE — 3074F SYST BP LT 130 MM HG: CPT | Performed by: INTERNAL MEDICINE

## 2022-11-09 PROCEDURE — 99204 OFFICE O/P NEW MOD 45 MIN: CPT | Performed by: INTERNAL MEDICINE

## 2022-11-09 PROCEDURE — 3078F DIAST BP <80 MM HG: CPT | Performed by: INTERNAL MEDICINE

## 2022-11-09 ASSESSMENT — SLEEP AND FATIGUE QUESTIONNAIRES
HOW LIKELY ARE YOU TO NOD OFF OR FALL ASLEEP WHILE SITTING AND TALKING TO SOMEONE: 2
HOW LIKELY ARE YOU TO NOD OFF OR FALL ASLEEP WHILE LYING DOWN TO REST IN THE AFTERNOON WHEN CIRCUMSTANCES PERMIT: 3
HOW LIKELY ARE YOU TO NOD OFF OR FALL ASLEEP WHILE SITTING INACTIVE IN A PUBLIC PLACE: 0
HOW LIKELY ARE YOU TO NOD OFF OR FALL ASLEEP WHEN YOU ARE A PASSENGER IN A CAR FOR AN HOUR WITHOUT A BREAK: 3
NECK CIRCUMFERENCE (INCHES): 17.2
HOW LIKELY ARE YOU TO NOD OFF OR FALL ASLEEP WHILE SITTING QUIETLY AFTER LUNCH WITHOUT ALCOHOL: 1
HOW LIKELY ARE YOU TO NOD OFF OR FALL ASLEEP IN A CAR, WHILE STOPPED FOR A FEW MINUTES IN TRAFFIC: 0
HOW LIKELY ARE YOU TO NOD OFF OR FALL ASLEEP WHILE SITTING AND READING: 3
ESS TOTAL SCORE: 15
HOW LIKELY ARE YOU TO NOD OFF OR FALL ASLEEP WHILE WATCHING TV: 3

## 2022-11-09 ASSESSMENT — ENCOUNTER SYMPTOMS
ABDOMINAL DISTENTION: 0
ABDOMINAL PAIN: 0
APNEA: 1
CHOKING: 0
NAUSEA: 0
RHINORRHEA: 0
CHEST TIGHTNESS: 0
ALLERGIC/IMMUNOLOGIC NEGATIVE: 1
SHORTNESS OF BREATH: 0
VOMITING: 0
EYE PAIN: 0
PHOTOPHOBIA: 0

## 2022-11-09 NOTE — LETTER
[facility]  [place] [address] [place] [address]  Phone: [phone]  Fax: [phone]    Daniel Orellana MD    November 9, 2022     Diony Baca MD  [address] [place] [address]    Patient: Luzma Yadav   MR Number: [id number]   YOB: 1970   Date of Visit: 11/9/2022       Dear Diony Baca: Thank you for referring Darrell Cummings to me for evaluation/treatment. Below are the relevant portions of my assessment and plan of care. Visit Diagnoses and Associated Orders       Hypersomnia   (New Problem)  -  Primary    needs work-up    Home Sleep Study (HST) [02436 Custom]   - Future Order         Snoring   (New Problem)      needs work-up    Home Sleep Study (HST) [16257 Custom]   - Future Order         Essential hypertension   (Stable)           Gastroesophageal reflux disease, unspecified whether esophagitis present   (Stable)           Anxiety   (Stable)           Class 3 severe obesity due to excess calories with serious comorbidity and body mass index (BMI) of 45.0 to 49.9 in [facility])   (Not Stable)                   One or more undiagnosed new problem with uncertain prognosis till final diagnosis is made. Differential diagnosis includes but not limited to: SALVATORE, PLMD's, narcolepsy, parasomnias. Reviewed SALVATORE (highest likelihood Dx): pathophysiology, diagnosis, complications and treatment. Instructed her not to drive if drowsy. Continue medications per her PCP and other physicians. Limit caffeine use after 3pm. Standard of care is to do in-lab PSG but insurance is mandating an inferior HST. 1 wk follow up after study to review her results. The chronic medical conditions listed are directly related to the primary diagnosis listed above. The management of the primary diagnosis affects the secondary diagnosis and vice versa.     This information was analyzed to assess complexity and medical decision making in regards to further testing and management. Continue meds for: hypertension, GERD, and YESENIA. Pt would medically benefit from wt loss for SALVATORE (diet, exercise, surgical). Orders Placed This Encounter   Procedures    Home Sleep Study (HST)         If you have questions, please do not hesitate to call me. I look forward to following Mackenzie along with you.     Sincerely,      eNto Tilley MD

## 2022-11-09 NOTE — PROGRESS NOTES
Kim Toledo [facility]  [address] [facility], [address] [facility]- [phone]   [facility] [address] Aline Weems. [address] [place] [address]. Latasha Crowder [address] [phone]     [place] [address]  Dept: [phone]  Loc: [phone]    Assessment:      Visit Diagnoses and Associated Orders       Hypersomnia   (New Problem)  -  Primary    needs work-up    Home Sleep Study (HST) [10018 Custom]   - Future Order         Snoring   (New Problem)      needs work-up    Home Sleep Study (HST) [57365 Custom]   - Future Order         Essential hypertension   (Stable)           Gastroesophageal reflux disease, unspecified whether esophagitis present   (Stable)           Anxiety   (Stable)           Class 3 severe obesity due to excess calories with serious comorbidity and body mass index (BMI) of 45.0 to 49.9 in adult [facility])   (Not Stable)                    Plan:      One or more undiagnosed new problem with uncertain prognosis till final diagnosis is made. Differential diagnosis includes but not limited to: SALVATORE, PLMD's, narcolepsy, parasomnias. Reviewed SALVATORE (highest likelihood Dx): pathophysiology, diagnosis, complications and treatment. Instructed her not to drive if drowsy. Continue medications per her PCP and other physicians. Limit caffeine use after 3pm. Standard of care is to do in-lab PSG but insurance is mandating an inferior HST. 1 wk follow up after study to review her results. The chronic medical conditions listed are directly related to the primary diagnosis listed above. The management of the primary diagnosis affects the secondary diagnosis and vice versa. This information was analyzed to assess complexity and medical decision making in regards to further testing and management. Continue meds for: hypertension, GERD, and YESENIA.  Pt would medically benefit from wt loss for SALVATORE (diet, exercise, surgical). Orders Placed This Encounter   Procedures    Home Sleep Study (HST)            Subjective:     Patient ID: Morelia Mathis is a 45 y.o. female. Chief Complaint   Patient presents with    Sleep Apnea    Daytime Sleepiness       HPI:      Morelia Mathis is a 45 y.o. female referred by Mae Marin MD for a sleep evaluation. She complains of: snoring, witnessed apneas, excessive daytime sleepiness , non-restorative sleep, waking choking/gasping, napping, and tossing and turning at night. She denies: cataplexy and hypnagogic hallucinations. Symptoms began several years ago, gradually worsening since that time. Previous evaluation and treatment has included- none    Chronic stable medical conditions: hypertension, GERD, and YESENIA  Chronic unstable medical conditions: obesity    DOT/CDL - no  FAA/'s license -no    Previous Report(s) Reviewed: historical medical records, office notes, andreferral letter(s). Pertinent data has been documented. [place] Sleepiness Score: 15    Caffeine Intake - Pop/Soda: 3 bottle(s) per day    Social History     Socioeconomic History    Marital status:      Spouse name: Not on file    Number of children: Not on file    Years of education: Not on file    Highest education level: Not on file   Occupational History    Not on file   Tobacco Use    Smoking status: Never    Smokeless tobacco: Never   Vaping Use    Vaping Use: Never used   Substance and Sexual Activity    Alcohol use:  Yes     Alcohol/week: 2.0 standard drinks     Types: 2 Cans of beer per week     Comment: social    Drug use: No    Sexual activity: Yes     Partners: Male   Other Topics Concern    Not on file   Social History Narrative    Not on file     Social Determinants of Health     Financial Resource Strain: Low Risk     Difficulty of Paying Living Expenses: Not hard at all   Food Insecurity: No Food Insecurity    Worried About [address] in the Last Year: Never true    Ran Out of Food in the Last Year: Never true   Transportation Needs: Not on file   Physical Activity: Not on file   Stress: Not on file   Social Connections: Not on file   Intimate Partner Violence: Not on file   Housing Stability: Not on file        Current Outpatient Medications   Medication Instructions    Acetaminophen (TYLENOL ARTHRITIS PAIN PO) Oral, PRN    calcitRIOL (ROCALTROL) 0.25 MCG capsule TAKE ONE CAPSULE BY MOUTH TWICE A DAY    Calcium Carbonate Antacid (TUMS ULTRA 1000) 1000 MG CHEW 1 tablet twice daily    fenofibrate (TRICOR) 145 MG tablet TAKE ONE TABLET BY MOUTH DAILY    fluticasone (FLONASE) 50 MCG/ACT nasal spray 2 sprays, Nasal, DAILY    levothyroxine (SYNTHROID) 200 MCG tablet TAKE ONE TABLET BY MOUTH DAILY    lisinopril-hydroCHLOROthiazide (PRINZIDE;ZESTORETIC) 10-12.5 MG per tablet TAKE ONE TABLET BY MOUTH DAILY    magnesium (MAGNESIUM-OXIDE) 250 mg, Oral, 2 TIMES DAILY    Multiple Vitamins-Minerals (THERAPEUTIC MULTIVITAMIN-MINERALS) tablet 1 tablet, Oral, DAILY    promethazine (PHENERGAN) 25 mg, Oral, EVERY 6 HOURS PRN    sertraline (ZOLOFT) 50 MG tablet TAKE ONE TABLET BY MOUTH DAILY    Vitamin D, Ergocalciferol, 50 MCG (2000 UT) CAPS 2 caps daily       Allergies as of 11/09/2022 - Fully Reviewed 11/09/2022   Allergen Reaction Noted    Sulfa antibiotics Rash 01/06/2015       Patient Active Problem List   Diagnosis    HTN (hypertension)    Surgical menopause on hormone replacement therapy    Hypertriglyceridemia    Gastroesophageal reflux disease    Anxiety    Allergic rhinitis    Pain of right middle finger    Epigastric abdominal pain    Nausea    Low back pain without sciatica    Diarrhea    Class 3 severe obesity due to excess calories with serious comorbidity and body mass index (BMI) of 45.0 to 49.9 in adult (HCC)    Hyperparathyroidism, primary ([facility] [address]    Premature menopause    Osteopenia    Hashimoto's thyroiditis    Colon polyp    Hypothyroidism, postsurgical Postsurgical hypoparathyroidism (HCC)    Hypomagnesemia    Thyroid cancer (HCC)    Prediabetes    Mixed hyperlipidemia    Snoring    Class 3 severe obesity with body mass index (BMI) of 45.0 to 49.9 in adult [facility])    Palpitations    Urinary tract infection without hematuria    Syncope    Essential hypertension       Past Medical History:   Diagnosis Date    Anxiety     Endometriosis     Hyperlipidemia     Hypertension        Past Surgical History:   Procedure Laterality Date    ACHILLES TENDON SURGERY Right 6/24/2019    RIGHT ACHILLES TENDON RELEASE AND HEEL EXOSTECTOMY performed by Cass Rick DPM at [place] [address]    [address]    COLONOSCOPY  03/18/2015    Brien GOMEZ    FOOT SURGERY Left 2013    left foot recontstructon    HYSTERECTOMY (CERVIX STATUS UNKNOWN)  2013    complete - vaginal hyst    THYROIDECTOMY      TUBAL LIGATION  2013    with ablation    US THYROID BIOPSY  7/31/2020    US THYROID BIOPSY [facility] ULTRASOUND       Family History   Problem Relation Age of Onset    Diabetes Mother     High Blood Pressure Mother     Stroke Mother     High Blood Pressure Father     Prostate Cancer Father     Colon Cancer Maternal Grandmother     Cancer Maternal Grandmother 72        colon cancer    Cancer Paternal Grandmother         lung    Breast Cancer Maternal Aunt     Cancer Maternal Aunt 62        breast       Review of Systems   Constitutional:  Positive for fatigue and unexpected weight change. Negative for activity change and appetite change. HENT:  Positive for congestion. Negative for nosebleeds, postnasal drip, rhinorrhea and sneezing. Eyes:  Negative for photophobia, pain and visual disturbance. Respiratory:  Positive for apnea. Negative for choking, chest tightness and shortness of breath. Cardiovascular: Negative. Gastrointestinal:  Negative for abdominal distention, abdominal pain, nausea and vomiting.    Endocrine: Negative for cold intolerance and heat intolerance. Genitourinary:  Negative for difficulty urinating, dysuria, frequency and urgency. Musculoskeletal: Negative. Negative for neck pain and neck stiffness. Skin: Negative. Allergic/Immunologic: Negative. Neurological:  Negative for tremors, seizures, syncope and weakness. Hematological:  Negative for adenopathy. Does not bruise/bleed easily. Psychiatric/Behavioral:  Positive for sleep disturbance. Negative for agitation, behavioral problems and confusion. Objective:     Vitals:  Weight BMI   Wt Readings from Last 3 Encounters:   11/09/22 260 lb (117.9 kg)   11/03/22 257 lb (116.6 kg)   09/20/22 259 lb (117.5 kg)    Body mass index is 46.95 kg/m². BP HR SaO2   BP Readings from Last 3 Encounters:   11/09/22 121/78   11/03/22 118/64   09/20/22 134/80    Pulse Readings from Last 3 Encounters:   11/09/22 86   11/03/22 89   09/20/22 69    SpO2 Readings from Last 3 Encounters:   11/09/22 97%   11/03/22 96%   09/20/22 97%        Physical Exam  Vitals reviewed. Constitutional:       General: She is not in acute distress. Appearance: Normal appearance. She is well-developed. She is obese. She is not toxic-appearing or diaphoretic. HENT:      Head: Normocephalic and atraumatic. Not macrocephalic and not microcephalic. Right Ear: External ear normal.      Left Ear: External ear normal.      Nose: Septal deviation and mucosal edema present. No nasal deformity. Mouth/Throat:      Lips: Pink. Mouth: Mucous membranes are moist.      Tongue: No lesions. Palate: No mass. Pharynx: Uvula midline. No oropharyngeal exudate or uvula swelling. Tonsils: No tonsillar exudate or tonsillar abscesses. Comments: Tonsils: normal size  Eyes:      General: Lids are normal.      Extraocular Movements: Extraocular movements intact. Conjunctiva/sclera: Conjunctivae normal.      Pupils: Pupils are equal, round, and reactive to light.    Neck: Vascular: No JVD. Trachea: Trachea normal.      Comments: Neck Circ: 17.2 inches    Cardiovascular:      Rate and Rhythm: Normal rate and regular rhythm. Heart sounds: Normal heart sounds. Pulmonary:      Effort: Pulmonary effort is normal.      Breath sounds: Normal breath sounds. Abdominal:      General: Bowel sounds are normal.   Musculoskeletal:      Cervical back: Normal range of motion. Comments: No evidence of cyanosis or clubbing of nails   Skin:     General: Skin is warm. Nails: There is no clubbing. Neurological:      General: No focal deficit present. Mental Status: She is alert. Psychiatric:         Attention and Perception: Attention normal.         Mood and Affect: Mood and affect normal.         Speech: Speech normal.         Behavior: Behavior normal. Behavior is cooperative. Thought Content:  Thought content normal.       Electronically signed by Kim Sanders MD on11/9/2022 at 3:10 PM

## 2022-11-11 ENCOUNTER — TELEPHONE (OUTPATIENT)
Dept: SLEEP CENTER | Age: 52
End: 2022-11-11

## 2022-11-30 DIAGNOSIS — E78.2 MIXED HYPERLIPIDEMIA: ICD-10-CM

## 2022-11-30 DIAGNOSIS — F41.9 ANXIETY: ICD-10-CM

## 2022-11-30 RX ORDER — FENOFIBRATE 145 MG/1
TABLET, COATED ORAL
Qty: 90 TABLET | Refills: 1 | Status: SHIPPED | OUTPATIENT
Start: 2022-11-30

## 2022-11-30 NOTE — TELEPHONE ENCOUNTER
Medication:   Requested Prescriptions     Pending Prescriptions Disp Refills    fenofibrate (TRICOR) 145 MG tablet [Pharmacy Med Name: FENOFIBRATE 145 MG TABLET] 90 tablet 0     Sig: TAKE ONE TABLET BY MOUTH DAILY    sertraline (ZOLOFT) 50 MG tablet [Pharmacy Med Name: SERTRALINE HCL 50 MG TABLET] 90 tablet 0     Sig: TAKE ONE TABLET BY MOUTH DAILY       Last Filled:      Patient Phone Number: [phone] (home)     Last appt: 9/20/2022   Next appt: 12/22/2022    Last BMP:   Lab Results   Component Value Date/Time     10/26/2022 03:21 PM    K 3.8 10/26/2022 03:21 PM    CL 99 10/26/2022 03:21 PM    CO2 24 10/26/2022 03:21 PM    ANIONGAP 19 10/26/2022 03:21 PM    GLUCOSE 116 10/26/2022 03:21 PM    BUN 16 10/26/2022 03:21 PM    CREATININE 0.8 10/26/2022 03:21 PM    LABGLOM >60 10/26/2022 03:21 PM    GFRAA >60 09/20/2022 04:01 PM    CALCIUM 9.3 10/26/2022 03:21 PM      Last CMP:   Lab Results   Component Value Date/Time     10/26/2022 03:21 PM    K 3.8 10/26/2022 03:21 PM    CL 99 10/26/2022 03:21 PM    CO2 24 10/26/2022 03:21 PM    ANIONGAP 19 10/26/2022 03:21 PM    GLUCOSE 116 10/26/2022 03:21 PM    BUN 16 10/26/2022 03:21 PM    CREATININE 0.8 10/26/2022 03:21 PM    LABGLOM >60 10/26/2022 03:21 PM    GFRAA >60 09/20/2022 04:01 PM    PROT 7.2 10/26/2022 03:21 PM    LABALBU 4.8 10/26/2022 03:21 PM    AGRATIO 2.0 10/26/2022 03:21 PM    BILITOT <0.2 10/26/2022 03:21 PM    ALKPHOS 82 10/26/2022 03:21 PM    ALT 25 10/26/2022 03:21 PM    AST 27 10/26/2022 03:21 PM    GLOB 2.7 08/02/2021 02:07 PM     Last Renal Function:   Lab Results   Component Value Date/Time     10/26/2022 03:21 PM    K 3.8 10/26/2022 03:21 PM    CL 99 10/26/2022 03:21 PM    CO2 24 10/26/2022 03:21 PM    GLUCOSE 116 10/26/2022 03:21 PM    BUN 16 10/26/2022 03:21 PM    CREATININE 0.8 10/26/2022 03:21 PM    PHOS 3.1 10/26/2022 03:21 PM    LABALBU 4.8 10/26/2022 03:21 PM    CALCIUM 9.3 10/26/2022 03:21 PM    GFRAA >60 09/20/2022 04:01 PM Last OARRS: No flowsheet data found.     Preferred Pharmacy:   [facility] [id number] [facility] [address]  [address]  Phone: [phone] Fax: [phone]  Medication:   Requested Prescriptions     Pending Prescriptions Disp Refills    fenofibrate (TRICOR) 145 MG tablet [Pharmacy Med Name: FENOFIBRATE 145 MG TABLET] 90 tablet 0     Sig: TAKE ONE TABLET BY MOUTH DAILY    sertraline (ZOLOFT) 50 MG tablet [Pharmacy Med Name: SERTRALINE HCL 50 MG TABLET] 90 tablet 0     Sig: TAKE ONE TABLET BY MOUTH DAILY       Last Filled:      Patient Phone Number: [phone] (home)     Last appt: 9/20/2022   Next appt: 12/22/2022    Last BMP:   Lab Results   Component Value Date/Time     10/26/2022 03:21 PM    K 3.8 10/26/2022 03:21 PM    CL 99 10/26/2022 03:21 PM    CO2 24 10/26/2022 03:21 PM    ANIONGAP 19 10/26/2022 03:21 PM    GLUCOSE 116 10/26/2022 03:21 PM    BUN 16 10/26/2022 03:21 PM    CREATININE 0.8 10/26/2022 03:21 PM    LABGLOM >60 10/26/2022 03:21 PM    GFRAA >60 09/20/2022 04:01 PM    CALCIUM 9.3 10/26/2022 03:21 PM      Last CMP:   Lab Results   Component Value Date/Time     10/26/2022 03:21 PM    K 3.8 10/26/2022 03:21 PM    CL 99 10/26/2022 03:21 PM    CO2 24 10/26/2022 03:21 PM    ANIONGAP 19 10/26/2022 03:21 PM    GLUCOSE 116 10/26/2022 03:21 PM    BUN 16 10/26/2022 03:21 PM    CREATININE 0.8 10/26/2022 03:21 PM    LABGLOM >60 10/26/2022 03:21 PM    GFRAA >60 09/20/2022 04:01 PM    PROT 7.2 10/26/2022 03:21 PM    LABALBU 4.8 10/26/2022 03:21 PM    AGRATIO 2.0 10/26/2022 03:21 PM    BILITOT <0.2 10/26/2022 03:21 PM    ALKPHOS 82 10/26/2022 03:21 PM    ALT 25 10/26/2022 03:21 PM    AST 27 10/26/2022 03:21 PM    GLOB 2.7 08/02/2021 02:07 PM     Last Renal Function:   Lab Results   Component Value Date/Time     10/26/2022 03:21 PM    K 3.8 10/26/2022 03:21 PM    CL 99 10/26/2022 03:21 PM    CO2 24 10/26/2022 03:21 PM    GLUCOSE 116 10/26/2022 03:21 PM    BUN 16 10/26/2022 03:21 PM    CREATININE 0.8 10/26/2022 03:21 PM    PHOS 3.1 10/26/2022 03:21 PM    LABALBU 4.8 10/26/2022 03:21 PM    CALCIUM 9.3 10/26/2022 03:21 PM    GFRAA >60 09/20/2022 04:01 PM       Last OARRS: No flowsheet data found.     Preferred Pharmacy:   [id number] [address] [place] [address]  Phone: [phone] Fax: [phone]

## 2022-12-07 ENCOUNTER — HOSPITAL ENCOUNTER (OUTPATIENT)
Dept: SLEEP CENTER | Age: 52
Discharge: HOME | End: 2022-12-07
Payer: COMMERCIAL

## 2022-12-07 DIAGNOSIS — G47.10 HYPERSOMNIA: ICD-10-CM

## 2022-12-07 DIAGNOSIS — R06.83 SNORING: ICD-10-CM

## 2022-12-07 PROCEDURE — 95806 SLEEP STUDY UNATT&RESP EFFT: CPT

## 2022-12-14 ENCOUNTER — TELEPHONE (OUTPATIENT)
Dept: PULMONOLOGY | Age: 52
End: 2022-12-14

## 2022-12-14 NOTE — TELEPHONE ENCOUNTER
Spoke with pt to review HST results. Order to be sent to [facility]. F/U to be scheduled.

## 2022-12-14 NOTE — PROGRESS NOTES
Date: 22    Electronically signed by Jazlyn Boykin MD on 2022 at 4:32 PM    Mackenzie Liu  1970  [place] [address]  [phone] (home)   [phone] (mobile)      Insurance Info (confirm with patient if correct):  Payer/Plan Subscr  Sex Relation Sub.  Ins. ID Effective Group Num

## 2022-12-30 DIAGNOSIS — I10 ESSENTIAL HYPERTENSION: ICD-10-CM

## 2022-12-30 RX ORDER — LISINOPRIL AND HYDROCHLOROTHIAZIDE 12.5; 1 MG/1; MG/1
TABLET ORAL
Qty: 90 TABLET | Refills: 0 | Status: SHIPPED | OUTPATIENT
Start: 2022-12-30

## 2022-12-30 NOTE — TELEPHONE ENCOUNTER
Medication:   Requested Prescriptions     Pending Prescriptions Disp Refills    lisinopril-hydroCHLOROthiazide (PRINZIDE;ZESTORETIC) 10-12.5 MG per tablet [Pharmacy Med Name: LISINOPRIL-HCTZ 10-12.5 MG TAB] 90 tablet 0     Sig: TAKE ONE TABLET BY MOUTH DAILY     Last Filled: 9/20/2022    Last appt: 9/20/2022   Next appt: 1/24/2023    Last Labs DM:   Lab Results   Component Value Date/Time    LABA1C 6.3 07/21/2022 10:59 AM     Last Lipid:   Lab Results   Component Value Date/Time    CHOL 177 07/21/2022 10:59 AM    TRIG 239 07/21/2022 10:59 AM    HDL 24 07/21/2022 10:59 AM    LDLCALC 105 07/21/2022 10:59 AM     Last PSA: No results found for: PSA  Last Thyroid:   Lab Results   Component Value Date/Time    TSH 0.55 10/26/2022 03:21 PM    FT3 2.9 10/26/2022 03:21 PM    T4FREE 2.1 10/26/2022 03:21 PM

## 2023-01-24 ENCOUNTER — OFFICE VISIT (OUTPATIENT)
Dept: PRIMARY CARE CLINIC | Age: 53
End: 2023-01-24
Payer: COMMERCIAL

## 2023-01-24 VITALS
HEART RATE: 94 BPM | HEIGHT: 62 IN | WEIGHT: 258.2 LBS | BODY MASS INDEX: 47.52 KG/M2 | TEMPERATURE: 98.1 F | SYSTOLIC BLOOD PRESSURE: 138 MMHG | OXYGEN SATURATION: 99 % | DIASTOLIC BLOOD PRESSURE: 68 MMHG

## 2023-01-24 DIAGNOSIS — M79.605 PAIN OF LEFT LOWER EXTREMITY: ICD-10-CM

## 2023-01-24 DIAGNOSIS — Z00.00 ENCOUNTER FOR WELL ADULT EXAM WITHOUT ABNORMAL FINDINGS: Primary | ICD-10-CM

## 2023-01-24 DIAGNOSIS — R73.03 PREDIABETES: ICD-10-CM

## 2023-01-24 DIAGNOSIS — E78.2 MIXED HYPERLIPIDEMIA: ICD-10-CM

## 2023-01-24 DIAGNOSIS — Z23 NEED FOR TDAP VACCINATION: ICD-10-CM

## 2023-01-24 DIAGNOSIS — K21.9 GASTROESOPHAGEAL REFLUX DISEASE, UNSPECIFIED WHETHER ESOPHAGITIS PRESENT: ICD-10-CM

## 2023-01-24 DIAGNOSIS — J30.9 ALLERGIC RHINITIS, UNSPECIFIED SEASONALITY, UNSPECIFIED TRIGGER: ICD-10-CM

## 2023-01-24 DIAGNOSIS — F41.9 ANXIETY: ICD-10-CM

## 2023-01-24 DIAGNOSIS — I10 ESSENTIAL HYPERTENSION: ICD-10-CM

## 2023-01-24 PROCEDURE — 3074F SYST BP LT 130 MM HG: CPT | Performed by: INTERNAL MEDICINE

## 2023-01-24 PROCEDURE — 3078F DIAST BP <80 MM HG: CPT | Performed by: INTERNAL MEDICINE

## 2023-01-24 PROCEDURE — 90471 IMMUNIZATION ADMIN: CPT | Performed by: INTERNAL MEDICINE

## 2023-01-24 PROCEDURE — 90715 TDAP VACCINE 7 YRS/> IM: CPT | Performed by: INTERNAL MEDICINE

## 2023-01-24 PROCEDURE — 99396 PREV VISIT EST AGE 40-64: CPT | Performed by: INTERNAL MEDICINE

## 2023-01-24 RX ORDER — IBUPROFEN 600 MG/1
600 TABLET ORAL 3 TIMES DAILY PRN
Qty: 90 TABLET | Refills: 0 | Status: SHIPPED | OUTPATIENT
Start: 2023-01-24

## 2023-01-24 ASSESSMENT — ENCOUNTER SYMPTOMS
VOICE CHANGE: 0
FACIAL SWELLING: 0
WHEEZING: 0
CONSTIPATION: 0
SINUS PRESSURE: 0
EYE DISCHARGE: 0
BLOOD IN STOOL: 0
DIARRHEA: 0
EYE PAIN: 0
CHOKING: 0
EYE ITCHING: 0
BACK PAIN: 0
RHINORRHEA: 0
SINUS PAIN: 0
PHOTOPHOBIA: 0
APNEA: 0
VOMITING: 0
ABDOMINAL PAIN: 0
SHORTNESS OF BREATH: 0
NAUSEA: 0
EYE REDNESS: 0
RECTAL PAIN: 0
SORE THROAT: 0
TROUBLE SWALLOWING: 0
ANAL BLEEDING: 0
COUGH: 0
ABDOMINAL DISTENTION: 0
CHEST TIGHTNESS: 0

## 2023-01-24 ASSESSMENT — PATIENT HEALTH QUESTIONNAIRE - PHQ9
SUM OF ALL RESPONSES TO PHQ9 QUESTIONS 1 & 2: 0
SUM OF ALL RESPONSES TO PHQ QUESTIONS 1-9: 0
2. FEELING DOWN, DEPRESSED OR HOPELESS: 0
SUM OF ALL RESPONSES TO PHQ QUESTIONS 1-9: 0
1. LITTLE INTEREST OR PLEASURE IN DOING THINGS: 0

## 2023-01-24 NOTE — PATIENT INSTRUCTIONS
Well Visit, Ages 25 to 72: Care Instructions  Well visits can help you stay healthy. Your doctor has checked your overall health and may have suggested ways to take good care of yourself. Your doctor also may have recommended tests. You can help prevent illness with healthy eating, good sleep, vaccinations, regular exercise, and other steps. Get the tests that you and your doctor decide on. Depending on your age and risks, examples might include screening for diabetes; hepatitis C; HIV; and cervical, breast, lung, and colon cancer. Screening helps find diseases before any symptoms appear. Eat healthy foods. Choose fruits, vegetables, whole grains, lean protein, and low-fat dairy foods. Limit saturated fat and reduce salt. Limit alcohol. Men should have no more than 2 drinks a day. Women should have no more than 1. For some people, no alcohol is the best choice. Exercise. Get at least 30 minutes of exercise on most days of the week. Walking can be a good choice. Reach and stay at your healthy weight. This will lower your risk for many health problems. Take care of your mental health. Try to stay connected with friends, family, and community, and find ways to manage stress. If you're feeling depressed or hopeless, talk to someone. A counselor can help. If you don't have a counselor, talk to your doctor. Talk to your doctor if you think you may have a problem with alcohol or drug use. This includes prescription medicines and illegal drugs. Avoid tobacco and nicotine: Don't smoke, vape, or chew. If you need help quitting, talk to your doctor. Practice safer sex. Getting tested, using condoms or dental dams, and limiting sex partners can help prevent STIs. Use birth control if it's important to you to prevent pregnancy. Talk with your doctor about your choices and what might be best for you. Prevent problems where you can.  Protect your skin from too much sun, wash your hands, brush your teeth twice a day, and wear a seat belt in the car.   Where can you learn more?  Go to https://www.[employer].net/patientEd and enter P072 to learn more about \"Well Visit, Ages 18 to 65: Care Instructions.\"  Current as of: March 9, 2022               Content Version: 13.5  © 0577-1905 [employer].   Care instructions adapted under license by [employer]. If you have questions about a medical condition or this instruction, always ask your healthcare professional. [employer] disclaims any warranty or liability for your use of this information.

## 2023-01-24 NOTE — PROGRESS NOTES
Well Adult Note  Name: Rahul Chapin Date: 2023   MRN: [id number] Sex: Female   Age: 47 y.o. Ethnicity: Non- / Non    : 1970 Race: White (non-)        Chief Complaint   Patient presents with    Annual Exam    Other     L hamstring pain patient fell on Saturday and again  on   last week        Gayathri Mejia is here for well adult exam.  History:  Patient presents for annual physical exam. Pap smear done on 20. Mammogram done on 22. Patient has Hypertension. Patient takes Lisinopril-HCTZ 10-12.5mg once daily. Patient decreases salt. Patient does not exercise. Patient has anxiety. Patient takes Sertraline 50mg once daily. Patient states her anxiety has been fine. Patient has Hyperlipidemia. Patient takes Fenofibrate 145mg once daily. Patient decreases fat and cholesterol somewhat. Patient has Prediabetes. Patient states she is better about decreasing carbohydrates. Patient has GERD. Patient takes Protonix 40mg 2 times per day. Patient denies heartburn and reflux. Patient drinks 2 Diet Pepsi's per day. Patient doesn't eat spicy food. Patient occasionally eats tomato based food. Patient has Allergic Rhinitis. Patient takes Flonase nasal spray and generic Claritin as needed. Patient denies symptoms. Pain left buttocks radiates to knee and inner thigh. Slipped in a parking lot on Saturday in [place] and ended doing splits and right knee hit ground and left knee went straight. Pain was sharp at first and now dull achy. On  fell again when she got home from [place] trying to get over a pile of snow. Injured same area. Pain is just below left buttock now and dull achy. Patient takes Tylenol. Patient just finished steroid pack today for her foot. Patient is wearing a boot left foot for a Tailor's bunion for 4 weeks. Patient states she has had it on for 1 week.     Review of Systems   Constitutional:  Negative for activity change, appetite change, chills, diaphoresis, fatigue, fever and unexpected weight change. HENT:  Negative for congestion, ear discharge, ear pain, facial swelling, hearing loss, mouth sores, nosebleeds, postnasal drip, rhinorrhea, sinus pressure, sinus pain, sneezing, sore throat, tinnitus, trouble swallowing and voice change. Eyes:  Negative for photophobia, pain, discharge, redness, itching and visual disturbance. Respiratory:  Negative for apnea, cough, choking, chest tightness, shortness of breath and wheezing. Cardiovascular:  Negative for chest pain, palpitations and leg swelling. Gastrointestinal:  Negative for abdominal distention, abdominal pain, anal bleeding, blood in stool, constipation, diarrhea, nausea, rectal pain and vomiting. Endocrine: Negative for cold intolerance and heat intolerance. Genitourinary:  Negative for decreased urine volume, difficulty urinating, dysuria, flank pain, frequency, genital sores, hematuria, menstrual problem, pelvic pain, urgency, vaginal bleeding, vaginal discharge and vaginal pain. Musculoskeletal:  Positive for arthralgias, gait problem (limping) and joint swelling (left foot swelling). Negative for back pain, myalgias, neck pain and neck stiffness. Skin:  Negative for rash and wound. Neurological:  Negative for dizziness, tremors, seizures, syncope, facial asymmetry, speech difficulty, weakness, light-headedness, numbness and headaches. Hematological:  Negative for adenopathy. Does not bruise/bleed easily. Psychiatric/Behavioral:  Negative for decreased concentration, dysphoric mood and sleep disturbance. The patient is nervous/anxious. All other systems reviewed and are negative. Allergies   Allergen Reactions    Sulfa Antibiotics Rash         Prior to Visit Medications    Medication Sig Taking?  Authorizing Provider   lisinopril-hydroCHLOROthiazide (PRINZIDE;ZESTORETIC) 10-12.5 MG per tablet TAKE ONE TABLET BY MOUTH DAILY Yes Erikatra B Seda Connolly MD   fenofibrate (TRICOR) 145 MG tablet TAKE ONE TABLET BY MOUTH DAILY Yes Ángel Sinha MD   sertraline (ZOLOFT) 50 MG tablet TAKE ONE TABLET BY MOUTH DAILY Yes Ángel Sinha MD   levothyroxine (SYNTHROID) 200 MCG tablet TAKE ONE TABLET BY MOUTH DAILY Yes Raj Quintero MD   promethazine (PHENERGAN) 25 MG tablet Take 1 tablet by mouth every 6 hours as needed for Nausea Yes Ángel Sinha MD   calcitRIOL (ROCALTROL) 0.25 MCG capsule TAKE ONE CAPSULE BY MOUTH TWICE A DAY Yes Raj Quintero MD   magnesium (MAGNESIUM-OXIDE) 250 MG TABS tablet Take 1 tablet by mouth 2 times daily Yes Raj Quintero MD   Vitamin D, Ergocalciferol, 50 MCG (2000 UT) CAPS 2 caps daily Yes Raj Quintero MD   Calcium Carbonate Antacid (TUMS ULTRA 1000) 1000 MG CHEW 1 tablet twice daily Yes Raj Quintero MD   Acetaminophen (TYLENOL ARTHRITIS PAIN PO) Take by mouth PRN Yes Historical Provider, MD   Multiple Vitamins-Minerals (THERAPEUTIC MULTIVITAMIN-MINERALS) tablet Take 1 tablet by mouth daily Yes Historical Provider, MD   fluticasone (FLONASE) 50 MCG/ACT nasal spray 2 sprays by Nasal route daily.  Yes Royce Nicole DO         Past Medical History:   Diagnosis Date    Anxiety     Endometriosis     Hyperlipidemia     Hypertension        Past Surgical History:   Procedure Laterality Date    ACHILLES TENDON SURGERY Right 6/24/2019    RIGHT ACHILLES TENDON RELEASE AND HEEL EXOSTECTOMY performed by Oral Mortimer, DPM at [facility]  2004    [address]    COLONOSCOPY  03/18/2015    Brien GOMEZ    FOOT SURGERY Left 2013    left foot recontstructon    HYSTERECTOMY (CERVIX STATUS UNKNOWN)  2013    complete - vaginal hyst    THYROIDECTOMY      TUBAL LIGATION  2013    with ablation    US THYROID BIOPSY  7/31/2020    US THYROID BIOPSY [facility] ULTRASOUND         Family History   Problem Relation Age of Onset    Diabetes Mother     High Blood Pressure Mother     Stroke Mother High Blood Pressure Father     Prostate Cancer Father     Colon Cancer Maternal Grandmother     Cancer Maternal Grandmother 72        colon cancer    Cancer Paternal Grandmother         lung    Breast Cancer Maternal Aunt     Cancer Maternal Aunt 58        breast       Social History     Tobacco Use    Smoking status: Never    Smokeless tobacco: Never   Vaping Use    Vaping Use: Never used   Substance Use Topics    Alcohol use: Yes     Alcohol/week: 2.0 standard drinks     Types: 2 Cans of beer per week     Comment: social    Drug use: No       Objective   /68 (Site: Right Upper Arm, Position: Sitting, Cuff Size: Large Adult)   Pulse 94   Temp 98.1 °F (36.7 °C) (Temporal)   Ht 5' 2.4\" (1.585 m)   Wt 258 lb 3.2 oz (117.1 kg)   SpO2 99%   BMI 46.62 kg/m²   Wt Readings from Last 3 Encounters:   02/02/23 260 lb 9.6 oz (118.2 kg)   01/24/23 258 lb 3.2 oz (117.1 kg)   11/09/22 260 lb (117.9 kg)     Additional Measurements    01/24/23 1549   Waist (Inches): 48 in         Physical Exam  Constitutional:       General: She is not in acute distress. Appearance: Normal appearance. HENT:      Head: Normocephalic and atraumatic. Right Ear: Tympanic membrane, ear canal and external ear normal.      Left Ear: Tympanic membrane, ear canal and external ear normal.      Nose: Nose normal.   Eyes:      General: Lids are normal.      Extraocular Movements: Extraocular movements intact. Conjunctiva/sclera: Conjunctivae normal.      Pupils: Pupils are equal, round, and reactive to light. Neck:      Thyroid: No thyromegaly. Vascular: No carotid bruit. Cardiovascular:      Rate and Rhythm: Normal rate and regular rhythm. Heart sounds: Normal heart sounds, S1 normal and S2 normal. No murmur heard. Pulmonary:      Effort: Pulmonary effort is normal.      Breath sounds: Normal breath sounds. Chest:   Breasts:     Right: No inverted nipple, mass, nipple discharge, skin change or tenderness. Left: No inverted nipple, mass, nipple discharge, skin change or tenderness. Comments: Chaperone for Intimate Exam  Chaperone was offered as part of the rooming process. Patient declined and agrees to continue with exam without a chaperone  Abdominal:      General: Bowel sounds are normal.      Palpations: Abdomen is soft. There is no hepatomegaly or splenomegaly. Tenderness: There is no abdominal tenderness. Musculoskeletal:      Right shoulder: No tenderness. Normal range of motion. Left shoulder: No tenderness. Normal range of motion. Right elbow: No tenderness. Left elbow: No tenderness. Right wrist: No swelling or tenderness. Left wrist: No swelling or tenderness. Right hand: No swelling or tenderness. Left hand: No swelling or tenderness. Cervical back: Neck supple. No tenderness. Thoracic back: No tenderness. Lumbar back: No tenderness. Normal range of motion. Right hip: No tenderness. Left hip: No tenderness. Left upper leg: Tenderness (lateral thigh) present. Right knee: Ecchymosis present. Tenderness (anterior knee) present. Left knee: No tenderness. Right lower leg: No edema. Right ankle: No swelling. No tenderness. Left ankle: No swelling. No tenderness. Right foot: No swelling. Left foot: No swelling. Comments: Tenderness left outer thigh and inner thigh and inferior to left buttocks    Boot on left foot   Lymphadenopathy:      Head:      Right side of head: No submandibular adenopathy. Left side of head: No submandibular adenopathy. Upper Body:      Right upper body: No axillary adenopathy. Left upper body: No axillary adenopathy. Skin:     General: Skin is warm and dry. Neurological:      Mental Status: She is alert and oriented to person, place, and time. Gait: Gait normal.      Deep Tendon Reflexes: Reflexes are normal and symmetric.    Psychiatric: Attention and Perception: Attention normal.         Mood and Affect: Mood normal.         Speech: Speech normal.         Assessment   Plan   1. Encounter for well adult exam without abnormal findings  - CBC with Auto Differential; Future  - Comprehensive Metabolic Panel; Future  - Lipid Panel; Future  - Urinalysis with Reflex to Culture; Future  - Pap smear done on 9/1/20  - Mammogram done on 11/8/22  - see immunization record  - Colonoscopy done on 3/18/15    2. Need for Tdap vaccination  - Tdap, BOOSTRIX, (age 8 yrs+), IM given    3. Prediabetes  - stable  - Low carbohydrate diet  - Hemoglobin A1C; Future  -Regular aerobic exercise after boot is discontinued    4. Essential hypertension  -stable  - Continue lisinopril-hydroCHLOROthiazide (PRINZIDE;ZESTORETIC) 10-12.5 MG per tablet; TAKE ONE TABLET BY MOUTH DAILY    -Low sodium diet  -Regular aerobic exercise after boot is discontinued    5. Mixed hyperlipidemia  - Continue fenofibrate (TRICOR) 145 MG tablet; TAKE ONE TABLET BY MOUTH DAILY   -Low fat, low cholesterol diet  -Regular aerobic exerciseafter boot is discontinued    6. Anxiety  - stable  - Continue sertraline (ZOLOFT) 50 MG tablet; TAKE ONE TABLET BY MOUTH DAILY    7. Gastroesophageal reflux disease, unspecified whether esophagitis present  -stable  -Continue Protonix 40mg 2 times daily  -Decrease caffeine, avoid spicy foods, avoid tomato based foods  -Eat small meals instead of large meals  -Wait 2-3 hours after eating before lying down     8. Allergic rhinitis, unspecified seasonality, unspecified trigger  -stable  -Continue Flonase nasal spray  -continue Loratadine 10mg once daily    9. Pain of left lower extremity  - s/p fall  - ibuprofen (ADVIL;MOTRIN) 600 MG tablet; Take 1 tablet by mouth 3 times daily as needed for Pain  Dispense: 90 tablet;  Refill: 0        Personalized Preventive Plan   Current Health Maintenance Status  Immunization History   Administered Date(s) Administered    COVID-19, PFIZER PURPLE top, DILUTE for use, (age 15 y+), 30mcg/0.3mL 02/19/2021, 03/11/2021, 12/16/2021    Hepatitis B (Recombivax HB) 07/01/2015, 08/12/2015    INFLUENZA, INTRADERMAL, QUADRIVALENT, PRESERVATIVE FREE 10/27/2016, 11/09/2017    Influenza, FLUARIX, FLULAVAL, FLUZONE (age 10 mo+) AND AFLURIA, (age 1 y+), PF, 0.5mL 11/12/2018    Influenza, FLUCELVAX, (age 10 mo+), MDCK, PF, 0.5mL 09/20/2022    Influenza, Triv, inactivated, subunit, adjuvanted, IM (Fluad 65 yrs and older) 03/20/2020    Tdap (Boostrix, Adacel) 09/24/2012, 01/24/2023    Zoster Recombinant (Shingrix) 09/01/2020, 06/03/2021        Health Maintenance   Topic Date Due    HIV screen  Never done    COVID-19 Vaccine (4 - Booster for Pfizer series) 02/10/2022    A1C test (Diabetic or Prediabetic)  07/21/2023    Depression Screen  01/24/2024    Breast cancer screen  11/08/2024    Colorectal Cancer Screen  03/18/2025    Lipids  07/21/2027    DTaP/Tdap/Td vaccine (3 - Td or Tdap) 01/24/2033    Flu vaccine  Completed    Shingles vaccine  Completed    Hepatitis C screen  Completed    Hepatitis A vaccine  Aged Out    Hib vaccine  Aged Out    Meningococcal (ACWY) vaccine  Aged Out    Pneumococcal 0-64 years Vaccine  Aged Out     Recommendations for [employer] Due: see orders and patient instructions/AVS.    Return in about 6 months (around 7/24/2023) for hypertension, anxiety prediabetes, hyperlipidemia, allergic rhinitis, and GERD.

## 2023-02-02 ENCOUNTER — OFFICE VISIT (OUTPATIENT)
Dept: PRIMARY CARE CLINIC | Age: 53
End: 2023-02-02
Payer: COMMERCIAL

## 2023-02-02 VITALS
HEART RATE: 83 BPM | WEIGHT: 260.6 LBS | SYSTOLIC BLOOD PRESSURE: 123 MMHG | DIASTOLIC BLOOD PRESSURE: 75 MMHG | HEIGHT: 62 IN | TEMPERATURE: 97.1 F | OXYGEN SATURATION: 99 % | BODY MASS INDEX: 47.96 KG/M2

## 2023-02-02 DIAGNOSIS — S76.312A STRAIN OF LEFT HAMSTRING MUSCLE, INITIAL ENCOUNTER: Primary | ICD-10-CM

## 2023-02-02 PROCEDURE — 3078F DIAST BP <80 MM HG: CPT | Performed by: STUDENT IN AN ORGANIZED HEALTH CARE EDUCATION/TRAINING PROGRAM

## 2023-02-02 PROCEDURE — 99213 OFFICE O/P EST LOW 20 MIN: CPT | Performed by: STUDENT IN AN ORGANIZED HEALTH CARE EDUCATION/TRAINING PROGRAM

## 2023-02-02 PROCEDURE — 3074F SYST BP LT 130 MM HG: CPT | Performed by: STUDENT IN AN ORGANIZED HEALTH CARE EDUCATION/TRAINING PROGRAM

## 2023-02-02 NOTE — PROGRESS NOTES
[address] [facility]  [place] [address] [id number] [facility] [address]  Dept: [phone]  Dept Fax: [phone]  Loc: [phone]      Nimesh Parisi is a 47 y.o. female who presents today for:  Chief Complaint   Patient presents with    Leg Pain     L leg pain 2 weeks ago patient had a fall. HPI:   Nimesh Parisi is 47 y.o. who presents today for evaluation of left leg pain. 2 weeks ago fall in parking lot and did the splits with left leg in front and right knee hitting ground. Since then has had pain in poserior left thigh w/ bruising. Pain worsens as day goes on. Feels tight and tender. Uses heat, ibuprofen. Slightly improved since injury. Then fell again this past weekend on ice. No hip or knee pain. Tolerating walking and standing. Pain worse acutely yesterday, better today.        Objective:     Vitals:    02/02/23 1507   BP: 123/75   Pulse: 83   Temp: 97.1 °F (36.2 °C)   SpO2: 99%       Wt Readings from Last 3 Encounters:   02/02/23 260 lb 9.6 oz (118.2 kg)   01/24/23 258 lb 3.2 oz (117.1 kg)   11/09/22 260 lb (117.9 kg)       BP Readings from Last 3 Encounters:   02/02/23 123/75   01/24/23 138/68   11/09/22 121/78       Lab Results   Component Value Date    WBC 7.2 09/20/2022    HGB 11.9 (L) 09/20/2022    HCT 35.8 (L) 09/20/2022    MCV 78.7 (L) 09/20/2022     09/20/2022     Lab Results   Component Value Date     10/26/2022    K 3.8 10/26/2022    CL 99 10/26/2022    CO2 24 10/26/2022    BUN 16 10/26/2022    CREATININE 0.8 10/26/2022    GLUCOSE 116 (H) 10/26/2022    CALCIUM 9.3 10/26/2022    PROT 7.2 10/26/2022    LABALBU 4.8 10/26/2022    BILITOT <0.2 10/26/2022    ALKPHOS 82 10/26/2022    AST 27 10/26/2022    ALT 25 10/26/2022    LABGLOM >60 10/26/2022    GFRAA >60 09/20/2022    AGRATIO 2.0 10/26/2022    GLOB 2.7 08/02/2021     Lab Results   Component Value Date    TSH 0.55 10/26/2022    FT3 2.9 10/26/2022    T4FREE 2.1 (H) 10/26/2022     Lab Results   Component Value Date    LABA1C 6.3 07/21/2022     Lab Results   Component Value Date    .1 07/21/2022     Lab Results   Component Value Date    CHOL 177 07/21/2022    CHOL 179 01/03/2022    CHOL 130 03/12/2021     Lab Results   Component Value Date    TRIG 239 (H) 07/21/2022    TRIG 290 (H) 01/03/2022    TRIG 152 (H) 03/12/2021     Lab Results   Component Value Date    HDL 24 (L) 07/21/2022    HDL 26 (L) 01/03/2022    HDL 24 (L) 03/12/2021     Lab Results   Component Value Date    LDLCALC 105 (H) 07/21/2022    [address] 01/03/2022    LDLCALC 76 03/12/2021     No results found for: PSA, PSADIA  Lab Results   Component Value Date    ORNDSWQT60 549 01/09/2015     Lab Results   Component Value Date/Time    VITD25 62.8 10/26/2022 03:21 PM          Lab Results   Component Value Date    LABMICR YES 06/05/2020       Review of Systems    Physical Exam  Constitutional:       Appearance: She is obese. HENT:      Head: Normocephalic and atraumatic. Pulmonary:      Effort: Pulmonary effort is normal.   Musculoskeletal:      Comments: Knees symmetric   No joint laxity  No effusion  Patellar reflexes even  Ecchymoses around left knee    Strength of hips 5/5  Tenderness with SLR posterior thigh  Log roll negative     Neurological:      Mental Status: She is alert.        Immunization History   Administered Date(s) Administered    COVID-19, PFIZER PURPLE top, DILUTE for use, (age 15 y+), 30mcg/0.3mL 02/19/2021, 03/11/2021, 12/16/2021    Hepatitis B (Recombivax HB) 07/01/2015, 08/12/2015    INFLUENZA, INTRADERMAL, QUADRIVALENT, PRESERVATIVE FREE 10/27/2016, 11/09/2017    Influenza, FLUARIX, FLULAVAL, FLUZONE (age 10 mo+) AND AFLURIA, (age 1 y+), PF, 0.5mL 11/12/2018    Influenza, FLUCELVAX, (age 10 mo+), MDCK, PF, 0.5mL 09/20/2022    Influenza, Triv, inactivated, subunit, adjuvanted, IM (Fluad 65 yrs and older) 03/20/2020    Tdap (Boostrix, Adacel) 09/24/2012, 01/24/2023    Zoster Recombinant (Shingrix) 09/01/2020, 06/03/2021       Health Maintenance Due   Topic Date Due    HIV screen  Never done    COVID-19 Vaccine (4 - Booster for [employer] series) 02/10/2022       Food Insecurity: No Food Insecurity    Worried About Running Out of Food in the Last Year: Never true    Maria [place]upe of Food in the Last Year: Never true       Assessment / Plan:   1. Strain of left hamstring muscle, initial encounter  Acute, Gait and strength in tact. No imaging warranted at this time. exercises provided. Offered formal PT and pt declined. Continue ibuprofen. Recommended heat, ice, stretches. F/u in 1-2 weeks if not improving           Return if symptoms worsen or fail to improve. Future Appointments   Date Time Provider Dario Rodríguez   3/2/2023  3:30 PM Quoc Lara MD [facility]   3/8/2023  3:00 PM TROY Mosher  [facility]   7/24/2023 10:45 AM MD ALEXIS Casanova  [facility]       Patient given educational materials - see patient instructions. Discussed use, benefit, and sideeffects of prescribed medications. All patient questions answered. Pt voiced understanding. Reviewed health maintenance. Instructed to continue current medications, diet and exercise. Patient agreed with treatment plan. Follow up as directed.      Electronically signed by Alejandro Escamilla DO on 2/2/2023 at 3:27 PM

## 2023-02-15 PROBLEM — M79.605 PAIN OF LEFT LOWER EXTREMITY: Status: ACTIVE | Noted: 2023-02-15

## 2023-02-19 DIAGNOSIS — M79.605 PAIN OF LEFT LOWER EXTREMITY: ICD-10-CM

## 2023-02-20 RX ORDER — IBUPROFEN 600 MG/1
TABLET ORAL
Qty: 90 TABLET | Refills: 0 | Status: SHIPPED | OUTPATIENT
Start: 2023-02-20

## 2023-02-20 NOTE — TELEPHONE ENCOUNTER
Medication:   Requested Prescriptions     Pending Prescriptions Disp Refills    ibuprofen (ADVIL;MOTRIN) 600 MG tablet [Pharmacy Med Name: IBUPROFEN 600 MG TABLET] 90 tablet 0     Sig: TAKE ONE TABLET BY MOUTH THREE TIMES A DAY AS NEEDED FOR PAIN     Last Filled:  01/24/2024    Last appt: 2/2/2023   Next appt: 7/24/2023    Last OARRS: No flowsheet data found.

## 2023-02-25 DIAGNOSIS — E28.319 PREMATURE MENOPAUSE: ICD-10-CM

## 2023-02-25 DIAGNOSIS — E66.01 CLASS 3 SEVERE OBESITY WITH SERIOUS COMORBIDITY AND BODY MASS INDEX (BMI) OF 45.0 TO 49.9 IN ADULT, UNSPECIFIED OBESITY TYPE (HCC): ICD-10-CM

## 2023-02-25 DIAGNOSIS — R73.03 PREDIABETES: ICD-10-CM

## 2023-02-25 DIAGNOSIS — E89.2 POSTSURGICAL HYPOPARATHYROIDISM (HCC): ICD-10-CM

## 2023-02-25 DIAGNOSIS — E83.42 HYPOMAGNESEMIA: ICD-10-CM

## 2023-02-25 DIAGNOSIS — C73 THYROID CANCER (HCC): ICD-10-CM

## 2023-02-25 DIAGNOSIS — E78.2 MIXED HYPERLIPIDEMIA: ICD-10-CM

## 2023-02-25 DIAGNOSIS — E21.0 HYPERPARATHYROIDISM, PRIMARY (HCC): ICD-10-CM

## 2023-02-25 DIAGNOSIS — E89.0 HYPOTHYROIDISM, POSTSURGICAL: ICD-10-CM

## 2023-02-25 DIAGNOSIS — Z00.00 ENCOUNTER FOR WELL ADULT EXAM WITHOUT ABNORMAL FINDINGS: ICD-10-CM

## 2023-02-25 LAB
A/G RATIO: 1.7 (ref 1.1–2.2)
ALBUMIN SERPL-MCNC: 4.5 G/DL (ref 3.4–5)
ALP BLD-CCNC: 72 U/L (ref 40–129)
ALT SERPL-CCNC: 24 U/L (ref 10–40)
ANION GAP SERPL CALCULATED.3IONS-SCNC: 15 MMOL/L (ref 3–16)
ANTI-THYROGLOB ABS: 11 IU/ML
AST SERPL-CCNC: 36 U/L (ref 15–37)
BASOPHILS ABSOLUTE: 0.1 K/UL (ref 0–0.2)
BASOPHILS RELATIVE PERCENT: 0.9 %
BILIRUB SERPL-MCNC: 0.3 MG/DL (ref 0–1)
BUN BLDV-MCNC: 13 MG/DL (ref 7–20)
CALCIUM SERPL-MCNC: 8.7 MG/DL (ref 8.3–10.6)
CHLORIDE BLD-SCNC: 97 MMOL/L (ref 99–110)
CHOLESTEROL, TOTAL: 164 MG/DL (ref 0–199)
CO2: 25 MMOL/L (ref 21–32)
CREAT SERPL-MCNC: 0.6 MG/DL (ref 0.6–1.1)
EOSINOPHILS ABSOLUTE: 0.2 K/UL (ref 0–0.6)
EOSINOPHILS RELATIVE PERCENT: 2.6 %
GFR SERPL CREATININE-BSD FRML MDRD: >60 ML/MIN/{1.73_M2}
GLUCOSE FASTING: 89 MG/DL (ref 70–99)
HCT VFR BLD CALC: 37 % (ref 36–48)
HDLC SERPL-MCNC: 21 MG/DL (ref 40–60)
HEMOGLOBIN: 11.9 G/DL (ref 12–16)
LDL CHOLESTEROL CALCULATED: 91 MG/DL
LYMPHOCYTES ABSOLUTE: 1.8 K/UL (ref 1–5.1)
LYMPHOCYTES RELATIVE PERCENT: 26 %
MAGNESIUM: 1.8 MG/DL (ref 1.8–2.4)
MCH RBC QN AUTO: 25.1 PG (ref 26–34)
MCHC RBC AUTO-ENTMCNC: 32.3 G/DL (ref 31–36)
MCV RBC AUTO: 77.7 FL (ref 80–100)
MONOCYTES ABSOLUTE: 0.5 K/UL (ref 0–1.3)
MONOCYTES RELATIVE PERCENT: 6.9 %
NEUTROPHILS ABSOLUTE: 4.5 K/UL (ref 1.7–7.7)
NEUTROPHILS RELATIVE PERCENT: 63.6 %
PARATHYROID HORMONE INTACT: 18.1 PG/ML (ref 14–72)
PDW BLD-RTO: 15.1 % (ref 12.4–15.4)
PHOSPHORUS: 3.2 MG/DL (ref 2.5–4.9)
PLATELET # BLD: 420 K/UL (ref 135–450)
PMV BLD AUTO: 7.6 FL (ref 5–10.5)
POTASSIUM SERPL-SCNC: 4.3 MMOL/L (ref 3.5–5.1)
RBC # BLD: 4.77 M/UL (ref 4–5.2)
SODIUM BLD-SCNC: 137 MMOL/L (ref 136–145)
T3 FREE: 2.7 PG/ML (ref 2.3–4.2)
T4 FREE: 1.8 NG/DL (ref 0.9–1.8)
TOTAL PROTEIN: 7.1 G/DL (ref 6.4–8.2)
TRIGL SERPL-MCNC: 259 MG/DL (ref 0–150)
TSH SERPL DL<=0.05 MIU/L-ACNC: 1.08 UIU/ML (ref 0.27–4.2)
VITAMIN D 25-HYDROXY: 52.6 NG/ML
VLDLC SERPL CALC-MCNC: 52 MG/DL
WBC # BLD: 7.1 K/UL (ref 4–11)

## 2023-02-26 LAB
ESTIMATED AVERAGE GLUCOSE: 128.4 MG/DL
HBA1C MFR BLD: 6.1 %

## 2023-02-27 ENCOUNTER — TELEPHONE (OUTPATIENT)
Dept: PRIMARY CARE CLINIC | Age: 53
End: 2023-02-27

## 2023-02-27 DIAGNOSIS — Z00.00 ENCOUNTER FOR WELL ADULT EXAM WITHOUT ABNORMAL FINDINGS: Primary | ICD-10-CM

## 2023-02-27 DIAGNOSIS — Z00.00 ENCOUNTER FOR WELL ADULT EXAM WITHOUT ABNORMAL FINDINGS: ICD-10-CM

## 2023-02-27 LAB
BACTERIA: NORMAL /HPF
BILIRUBIN URINE: NEGATIVE
BLOOD, URINE: NEGATIVE
CLARITY: CLEAR
COLOR: YELLOW
EPITHELIAL CELLS, UA: 1 /HPF (ref 0–5)
GLUCOSE URINE: NEGATIVE MG/DL
HYALINE CASTS: 0 /LPF (ref 0–8)
KETONES, URINE: NEGATIVE MG/DL
LEUKOCYTE ESTERASE, URINE: ABNORMAL
MICROSCOPIC EXAMINATION: YES
NITRITE, URINE: NEGATIVE
PH UA: 7 (ref 5–8)
PROTEIN UA: NEGATIVE MG/DL
RBC UA: 1 /HPF (ref 0–4)
SPECIFIC GRAVITY UA: 1.01 (ref 1–1.03)
URINE REFLEX TO CULTURE: ABNORMAL
URINE TYPE: ABNORMAL
UROBILINOGEN, URINE: 0.2 E.U./DL
WBC UA: 1 /HPF (ref 0–5)

## 2023-02-28 LAB
CALCIUM IONIZED, CALC AT PH 7.4: 1.11 MMOL/L (ref 1.09–1.3)
CALCIUM IONIZED: 1.11 MMOL/L (ref 1.09–1.3)

## 2023-03-02 ENCOUNTER — OFFICE VISIT (OUTPATIENT)
Dept: ENDOCRINOLOGY | Age: 53
End: 2023-03-02
Payer: COMMERCIAL

## 2023-03-02 VITALS
RESPIRATION RATE: 14 BRPM | HEIGHT: 62 IN | DIASTOLIC BLOOD PRESSURE: 69 MMHG | OXYGEN SATURATION: 99 % | TEMPERATURE: 98 F | HEART RATE: 92 BPM | WEIGHT: 258 LBS | SYSTOLIC BLOOD PRESSURE: 127 MMHG | BODY MASS INDEX: 47.48 KG/M2

## 2023-03-02 DIAGNOSIS — C73 THYROID CANCER (HCC): ICD-10-CM

## 2023-03-02 DIAGNOSIS — E66.01 CLASS 3 SEVERE OBESITY WITH SERIOUS COMORBIDITY AND BODY MASS INDEX (BMI) OF 45.0 TO 49.9 IN ADULT, UNSPECIFIED OBESITY TYPE (HCC): ICD-10-CM

## 2023-03-02 DIAGNOSIS — E89.2 POSTSURGICAL HYPOPARATHYROIDISM (HCC): ICD-10-CM

## 2023-03-02 DIAGNOSIS — E21.0 HYPERPARATHYROIDISM, PRIMARY (HCC): Primary | ICD-10-CM

## 2023-03-02 DIAGNOSIS — E28.319 PREMATURE MENOPAUSE: ICD-10-CM

## 2023-03-02 DIAGNOSIS — R73.03 PREDIABETES: ICD-10-CM

## 2023-03-02 DIAGNOSIS — E83.42 HYPOMAGNESEMIA: ICD-10-CM

## 2023-03-02 DIAGNOSIS — M85.80 OSTEOPENIA, UNSPECIFIED LOCATION: ICD-10-CM

## 2023-03-02 DIAGNOSIS — E06.3 HASHIMOTO'S THYROIDITIS: ICD-10-CM

## 2023-03-02 DIAGNOSIS — E78.2 MIXED HYPERLIPIDEMIA: ICD-10-CM

## 2023-03-02 DIAGNOSIS — E89.0 HYPOTHYROIDISM, POSTSURGICAL: ICD-10-CM

## 2023-03-02 PROBLEM — E04.2 MULTINODULAR GOITER: Status: ACTIVE | Noted: 2023-03-02

## 2023-03-02 PROCEDURE — 99214 OFFICE O/P EST MOD 30 MIN: CPT | Performed by: INTERNAL MEDICINE

## 2023-03-02 PROCEDURE — 3074F SYST BP LT 130 MM HG: CPT | Performed by: INTERNAL MEDICINE

## 2023-03-02 PROCEDURE — 3078F DIAST BP <80 MM HG: CPT | Performed by: INTERNAL MEDICINE

## 2023-03-02 RX ORDER — LEVOTHYROXINE SODIUM 0.2 MG/1
TABLET ORAL
Qty: 90 TABLET | Refills: 1 | Status: CANCELLED | OUTPATIENT
Start: 2023-03-02

## 2023-03-02 NOTE — PROGRESS NOTES
SUBJECTIVE:  Wiley Cabrera is a 47 y.o. female who is being evaluated for hyperparathyroidism. 1. Hyperparathyroidism, primary   This started in 2020. Patient was diagnosed with hypercalcemia. The problem has been gradually worsening. Patient started medication in 2020. Currently patient is on: N/A. Misses  N/A doses a month. Current complaints: fatigue, easy gets hot  No history of kidney stones  No persistent constipation, aches, depression  Subtotal parathyroidectomy 10/16/2020, right superior, left superior, left inferior    2. Thyroid cancer  EXAM: NM THYROID CARCINOMA ABLATION        INDICATION: Malignant neoplasm of thyroid gland (CMS Dx) 48-year-old female with a history of    papillary thyroid cancer status post thyroidectomy here for postthyroidectomy radioablation. RADIOPHARMACEUTICAL:  I-131 capsule 52.1 mCi PO          10/16/2020  Thyroid, total thyroidectomy:  -  Multifocal papillary thyroid carcinoma, classic type, involving the left  lobe (1.1 x 0.8 x 0.7 cm) and right lobe (3 mm). -  Negative for extrathyroidal extension.  -  Background thyroid with chronic lymphocytic thyroiditis and nodular  thyroid hyperplasia with adenomatoid nodules. -  Surgical resection margins are free of malignancy. History of obstructive symptoms: difficulty swallowing No, changes in voice/hoarseness No.  History of radiation to patient's neck: No  Resent iodine exposure: No  Family history includes no thyroid abnormalities. Family history of thyroid cancer: No    3. Premature menopause  Menopause since age 38  Was on Estrogen    4. Obesity  He is moderately healthy, active    5. Hashimoto's thyroiditis  Has fatigue    6. Osteopenia  Calcium in diet  No bone pain    7. Postsurgical hypoparathyroidism  Has numbness, tingling in hands. Not in face anymore, had after surgery    8. Hypomagnesemia  Has fatigue    9. Prediabetes  Mother had sugar problem    10.   Postsurgical hypothyroidism  Has fatigue    11. Hyperlipidemia  No muscle pain       FINDINGS:         Whole body images demonstrate the expected normal concentration of the radioiodine-131 in the    head, abdomen, pelvis and extremities. No abnormal areas of increased radioiodine are seen. Previously demonstrated focal radioiodine uptake at the anterior neck has resolved study. The 48-hour uptake at the level of the neck is 0.2%. Blood samples for thyroglobulin and TSH determination were also obtained. Norma Fitzgerald 2   IMPRESSION:   Normal whole body images without abnormal radioiodine concentrating tissue. EXAM: NM THYROID CARCINOMA ABLATION        INDICATION: Malignant neoplasm of thyroid gland (CMS Dx) 50-year-old female with a history of    papillary thyroid cancer status post thyroidectomy here for postthyroidectomy radioablation. RADIOPHARMACEUTICAL:  I-131 capsule 52.1 mCi PO            52.1   IMPRESSION:       Written home instructions, including radiation safety precautions, were provided and reviewed. Informed, written consent was obtained. All of her questions were answered to her satisfaction. She was treated with radioiodine-131 by mouth. She will return next week for a    post-treatment scan. Approved by Sudarshan Galvan MD on 1/29/2021 2:30 PM EST       I have personally reviewed the images and I agree with this report. Report Verified by: Gabriel Varghese MD at 1/29/2021 3:12 PM EST   52.1           10 year risk for major osteoporotic fracture is 3.8% and risk of a hip    fracture is 0.2%. EXAM: NM THYROID METASTASES WHOLE BODY       INDICATION: This patient with thyroid cancer is referred for imaging following an ablative dose    of iodine-131. TECHNICAL: Whole body thyroid carcinoma metastasis images with special attention to the neck    and chest were obtained 5 days following the administration of an ablative dose of iodine-131.        COMPARISON: Pretreatment study of 1/29/2021. FINDINGS:    Whole body images again demonstrate a focus of uptake in the anterior neck. No additional areas    of radioiodine accumulation are identified outside of the neck. IMPRESSION:   1. Radioactive iodine uptake in the anterior neck. No abnormal uptake outside the neck. Report Verified by: Elijah Shea MD at 2/3/2021 12:04 PM EST                 EXAMINATION:    BONE DENSITOMETRY         7/31/2020 8:26 am         TECHNIQUE:    A bone density DEXA scan was performed of the lumbar spine, bilateral hips    and left forearm on a Rapid7 system. COMPARISON:    None. HISTORY:    ORDERING SYSTEM PROVIDED HISTORY: Hyperparathyroidism, primary [facility])    TECHNOLOGIST PROVIDED HISTORY:    Reason for exam:->Hyperparathyroidism. Please include non-dominant forearm. Is the patient pregnant?->No         FINDINGS:    LEFT FOREARM:         The BMD of the middle third of the radius of the distal forearm equals 0.64    g/cm2. The T- and Z-scores are -0.9 and -0.2 standard deviations from the    mean. This is within the normal range by WHO criteria. LUMBAR SPINE:         The bone mineral density in the lumbar spine including the L1-L4 levels is    measured at 0.89 g/cm2, which corresponds to a T-score of -1.4 and a Z-score    of -0.6. This is within the osteopenia range by WHO criteria. LEFT HIP:         The bone mineral density in the total hip is measured at 0.92 g/cm2    corresponding to a T-score of -0.2 and a Z-score of 0.3. This is within the    normal range by WHO criteria. The bone mineral density of the femoral neck is measured at 0.75 g/cm2    corresponding to a T-score of -0.9 and a Z-score of -0.1. This is within the    normal range by WHO criteria. RIGHT HIP:         The bone mineral density in the total hip is measured at 0.90 g/cm2    corresponding to a T-score of -0.3 and a Z-score of 0.2.   This is within the    normal range by WHO criteria. The bone mineral density of the femoral neck is measured at 0.71 g/cm2    corresponding to a T-score of -1.2 and a Z-score of -0.5. This is within the    osteopenia range by WHO criteria. 10 year risk for major osteoporotic fracture is 3.8% and risk of a hip    fracture is 0.2%. Impression    Osteopenia by WHO criteria. EXAM: NM THYROID METASTASES WHOLE BODY   EXAM: NM THYROID METAS UPTAKE ADDL     INDICATION: Thyroid cancer (CMS Dx)     COMPARISON: Posttreatment I-131 scan dated 2/3/2021. RADIOPHARMACEUTICAL: Recombinant TSH 0.9 mg IM x 2, I-131 capsule 1.96 mCi PO.     TECHNICAL: A whole body thyroid cancer workup was performed using the recombinant TSH protocol. The patient was pretreated with recombinant TSH 2 days prior to the administration of the radiopharmaceutical Whole body thyroid carcinoma metastases imaging was performed several days thereafter. A quantitative thyroid carcinoma uptake was also performed. FINDINGS:      Whole body images demonstrate the expected normal concentration of the radioiodine-131 in the head, abdomen, pelvis and extremities. No abnormal areas of increased radioiodine are seen. Previously demonstrated focal radioiodine uptake at the anterior neck has resolved study. The 48-hour uptake at the level of the neck is 0.2%     Ref Range & Units 3 mo ago Comments   Thyroglobulin (Rhoda) 1.6 - 50.0 ng/mL <0.1 Low   Biotin megadosing (consumption >300 mcg/day) may falsely decrease thyroglobulin. When indicated, discontinue megadosing for 1 week and repeat testing.    Thyroglobulin Antibody (Rhoda) 0.0 - 4.0 IU/mL 1.2           Past Medical History:   Diagnosis Date    Anxiety     Endometriosis     Hyperlipidemia     Hypertension      Patient Active Problem List    Diagnosis Date Noted    Multinodular goiter 03/02/2023    Pain of left lower extremity 02/15/2023    Syncope 10/04/2022    Essential hypertension 10/04/2022    Urinary tract infection without hematuria 08/23/2022    Palpitations 12/07/2021    Class 3 severe obesity with body mass index (BMI) of 45.0 to 49.9 in adult [facility]) 09/26/2021    Prediabetes 03/09/2021    Mixed hyperlipidemia 03/09/2021    Snoring 03/09/2021    Hypothyroidism, postsurgical 12/08/2020    Postsurgical hypoparathyroidism ([facility] 12/08/2020    Hypomagnesemia 12/08/2020    [facility] 12/08/2020    Hashimoto's thyroiditis 08/06/2020    Osteopenia 08/01/2020    Class 3 severe obesity due to excess calories with serious comorbidity and body mass index (BMI) of 45.0 to 49.9 in adult [facility]) 07/07/2020    Hyperparathyroidism, primary ([facility] 07/07/2020    Premature menopause 07/07/2020    Epigastric abdominal pain 06/12/2020    Nausea 06/12/2020    Low back pain without sciatica 06/12/2020    Diarrhea 06/12/2020    Allergic rhinitis 03/20/2020    Pain of right middle finger 03/20/2020    Anxiety 05/10/2018    Gastroesophageal reflux disease 10/30/2016    Surgical menopause on hormone replacement therapy 12/17/2015    Hypertriglyceridemia 12/17/2015    HTN (hypertension) 07/01/2015    Colon polyp 03/18/2015     Past Surgical History:   Procedure Laterality Date    ACHILLES TENDON SURGERY Right 6/24/2019    RIGHT ACHILLES TENDON RELEASE AND HEEL EXOSTECTOMY performed by Eboni Waddell DPM at [facility]  2004    [id number]    COLONOSCOPY  03/18/2015    Brien GOMEZ    FOOT SURGERY Left 2013    left foot recontstructon    HYSTERECTOMY (CERVIX STATUS UNKNOWN)  2013    complete - vaginal hyst    THYROIDECTOMY      TUBAL LIGATION  2013    with ablation    US THYROID BIOPSY  7/31/2020    US THYROID BIOPSY [facility] ULTRASOUND     Family History   Problem Relation Age of Onset    Diabetes Mother     High Blood Pressure Mother     Stroke Mother     High Blood Pressure Father     Prostate Cancer Father     Colon Cancer Maternal Grandmother     Cancer Maternal Grandmother 72        colon cancer    Cancer Paternal Grandmother         lung    Breast Cancer Maternal Aunt     Cancer Maternal Aunt 58        breast     Social History     Socioeconomic History    Marital status:      Spouse name: None    Number of children: None    Years of education: None    Highest education level: None   Tobacco Use    Smoking status: Never    Smokeless tobacco: Never   Vaping Use    Vaping Use: Never used   Substance and Sexual Activity    Alcohol use:  Yes     Alcohol/week: 2.0 standard drinks     Types: 2 Cans of beer per week     Comment: social    Drug use: No    Sexual activity: Yes     Partners: Male     Social Determinants of Health     Financial Resource Strain: Low Risk     Difficulty of Paying Living Expenses: Not hard at all   Food Insecurity: No Food Insecurity    Worried About Running Out of Food in the Last Year: Never true    Ran Out of Food in the Last Year: Never true     Current Outpatient Medications   Medication Sig Dispense Refill    ibuprofen (ADVIL;MOTRIN) 600 MG tablet TAKE ONE TABLET BY MOUTH THREE TIMES A DAY AS NEEDED FOR PAIN 90 tablet 0    lisinopril-hydroCHLOROthiazide (PRINZIDE;ZESTORETIC) 10-12.5 MG per tablet TAKE ONE TABLET BY MOUTH DAILY 90 tablet 0    fenofibrate (TRICOR) 145 MG tablet TAKE ONE TABLET BY MOUTH DAILY 90 tablet 1    sertraline (ZOLOFT) 50 MG tablet TAKE ONE TABLET BY MOUTH DAILY 90 tablet 1    levothyroxine (SYNTHROID) 200 MCG tablet TAKE ONE TABLET BY MOUTH DAILY 90 tablet 1    promethazine (PHENERGAN) 25 MG tablet Take 1 tablet by mouth every 6 hours as needed for Nausea 12 tablet 0    magnesium (MAGNESIUM-OXIDE) 250 MG TABS tablet Take 1 tablet by mouth 2 times daily 60 tablet 1    Vitamin D, Ergocalciferol, 50 MCG (2000 UT) CAPS 2 caps daily 30 capsule 0    Calcium Carbonate Antacid (TUMS ULTRA 1000) 1000 MG CHEW 1 tablet twice daily 60 tablet 0    Acetaminophen (TYLENOL ARTHRITIS PAIN PO) Take by mouth PRN      Multiple Vitamins-Minerals (THERAPEUTIC MULTIVITAMIN-MINERALS) tablet Take 1 tablet by mouth daily      fluticasone (FLONASE) 50 MCG/ACT nasal spray 2 sprays by Nasal route daily. 1 Bottle 3     No current facility-administered medications for this visit.     Allergies   Allergen Reactions    Sulfa Antibiotics Rash     Family Status   Relation Name Status    Mother Marian Alive    Father Cherokee Alive    Sister  Alive    Sister   at age 27        Down Syndrome    Brother  Alive    MGTREMAINE Shay (Not Specified)    PGTREMAINE Ibarra (Not Specified)    Ta James (Not Specified)       Review of Systems:  Constitutional: has fatigue, no fever, no recent weight gain, no recent weight loss, no changes in appetite  Eyes: no eye pain, no change in vision, no eye redness, no eye irritation, no double vision  Ears, nose, throat: no nasal congestion, no sore throat, no earache, no decrease in hearing, no hoarseness, no dry mouth, no sinus problems, no difficulty swallowing, no neck lumps, no dental problems, no mouth sores, no ringing in ears  Pulmonary: no shortness of breath, no wheezing, no dyspnea on exertion, no cough  Cardiovascular: no chest pain, no lower extremity edema, no orthopnea, no intermittent leg claudication, no palpitations  Gastrointestinal: has abdominal pain, no nausea, no vomiting, has diarrhea, has constipation, no dysphagia, has heartburn, no bloating  Genitourinary: no dysuria, no urinary incontinence, no urinary hesitancy, no urinary frequency, no feelings of urinary urgency, no nocturia  Musculoskeletal: no joint swelling, no joint stiffness, has joint pain, has muscle cramps, no muscle pain, no bone pain  Integument/Breast: no hair loss, no skin rashes, no skin lesions, no itching, no dry skin  Neurological: no numbness, no tingling, no weakness, no confusion, no headaches, no dizziness, no fainting, no tremors, no decrease in memory, no balance problems  Psychiatric: no anxiety, no depression, no  insomnia  Hematologic/Lymphatic: no tendency for easy bleeding, no swollen lymph nodes, no tendency for easy bruising  Immunology: has seasonal allergies, no frequent infections, no frequent illnesses  Endocrine: has temperature intolerance    /69   Pulse 92   Temp 98 °F (36.7 °C)   Resp 14   Ht 5' 2\" (1.575 m)   Wt 258 lb (117 kg)   SpO2 99%   BMI 47.19 kg/m²    Wt Readings from Last 3 Encounters:   03/02/23 258 lb (117 kg)   02/02/23 260 lb 9.6 oz (118.2 kg)   01/24/23 258 lb 3.2 oz (117.1 kg)     Body mass index is 47.19 kg/m².     OBJECTIVE:  Constitutional: no acute distress, well appearing and well nourished  Psychiatric: oriented to person, place and time, judgement and insight and normal, recent and remote memory and intact and mood and affect are normal  Skin: skin and subcutaneous tissue is normal without mass, normal turgor  Head and Face: examination of head and face revealed no abnormalities  Eyes: no lid or conjunctival swelling, erythema or discharge, pupils are normal, equal, round, reactive to light  Ears/Nose: external inspection of ears and nose revealed no abnormalities, hearing is grossly normal  Oropharynx/Mouth/Face: lips, tongue and gums are normal with no lesions, the voice quality was normal  Neck: neck is supple and symmetric, with midline trachea and no masses, thyroid is absent, no palpable masses  Lymphatics: normal cervical lymph nodes, normal supraclavicular nodes  Pulmonary: no increased work of breathing or signs of respiratory distress, lungs are clear to auscultation  Cardiovascular: normal heart rate and rhythm, normal S1 and S2, no murmurs and pedal pulses and 2+ bilaterally, No edema  Abdomen: abdomen is soft, non-tender with no masses  Musculoskeletal: normal gait and station and exam of the digits and nails are normal  Neurological: normal coordination and normal general cortical function      Lab Review:    Lab Results   Component Value Date/Time    WBC 7.1 02/25/2023 10:59 AM    HGB 11.9 02/25/2023 10:59 AM    HCT 37.0 02/25/2023 10:59 AM    MCV 77.7 02/25/2023 10:59 AM     02/25/2023 10:59 AM     Lab Results   Component Value Date/Time     02/25/2023 10:59 AM    K 4.3 02/25/2023 10:59 AM    CL 97 02/25/2023 10:59 AM    CO2 25 02/25/2023 10:59 AM    BUN 13 02/25/2023 10:59 AM    CREATININE 0.6 02/25/2023 10:59 AM    GLUCOSE 116 10/26/2022 03:21 PM    CALCIUM 8.7 02/25/2023 10:59 AM    PROT 7.1 02/25/2023 10:59 AM    LABALBU 4.5 02/25/2023 10:59 AM    BILITOT 0.3 02/25/2023 10:59 AM    ALKPHOS 72 02/25/2023 10:59 AM    AST 36 02/25/2023 10:59 AM    ALT 24 02/25/2023 10:59 AM    LABGLOM >60 02/25/2023 10:59 AM    GFRAA >60 09/20/2022 04:01 PM    AGRATIO 1.7 02/25/2023 10:59 AM    GLOB 2.7 08/02/2021 02:07 PM     Lab Results   Component Value Date/Time    TSH 1.08 02/25/2023 10:59 AM    FT3 2.7 02/25/2023 10:59 AM     Lab Results   Component Value Date/Time    LABA1C 6.1 02/25/2023 10:59 AM     Lab Results   Component Value Date/Time    .4 02/25/2023 10:59 AM     Lab Results   Component Value Date/Time    CHOL 164 02/25/2023 10:59 AM     Lab Results   Component Value Date/Time    TRIG 259 02/25/2023 10:59 AM     Lab Results   Component Value Date/Time    HDL 21 02/25/2023 10:59 AM     Lab Results   Component Value Date/Time    LDLCALC 91 02/25/2023 10:59 AM     Lab Results   Component Value Date/Time    LABVLDL 52 02/25/2023 10:59 AM     No results found for: [facility]  Lab Results   Component Value Date/Time    LABMICR YES 02/25/2023 09:16 AM     Lab Results   Component Value Date/Time    VITD25 52.6 02/25/2023 10:59 AM        ASSESSMENT/PLAN:  1.  Hyperparathyroidism, primary   Next denis decrease Calcitriol  Calcitriol 0.25 mcg every other day  Calcium - Tums 1000 mg 0-4 a day, not every day  Vitamin D 2000 IU daily  Magnesium 250 mg daily  Calcium 8.7-8.6-9.0-8.5-8.4-8.8-8.3-8.7  Albumin 4.5-4.4-4.3  PTH 8. 5-11.4-14.1-15.5-16.4-14.8-18.1  Magnesium 1.7-1.7-1.7-1.5-1.7-1.8  25 hydroxy vitamin D 41.5-47.0-44.2-37.6-46.3-60.4-52.6  TSH 2.1-1.8  Subtotal parathyroidectomy-right superior, left superior, left inferior 10/16/2020   Total thyroidectomy    - Vitamin D 25 Hydroxy; Future  - Calcium Ionized Serum; Future  - PTH, Intact; Future    2. Thyroid cancer  Follow with Dr. Master Hall  Call for Tg results  Thyroglobulin less than 0.5-<0.1-<0.5-<0.5  Thyroglobulin antibody 13  TSH 0.55-1.08    1/2021  RADIOPHARMACEUTICAL:  I-131 capsule 52.1 mCi PO      Total thyroidectomy 10/16/2020  [id number]  Pre-Operative Diagnosis: Multinodular goiter, hypercalcemia  Post-Operative Diagnosis:     Same  Specimen(s) Submitted:   A. Rt. superior parathyroid; B. Lt. superior  parathyroid; C. Total thyroid; D. Lt. inferior parathyroid  A. Right superior parathyroid, parathyroidectomy:  -  Hypercellular parathyroid gland (35 mg). B.  Left superior parathryoid, parathyroidectomy:  -  Enlarged, hypercellular parathyroid (190 mg). C.  Thyroid, total thyroidectomy:  -  Multifocal papillary thyroid carcinoma, classic type, involving the left  lobe (1.1 x 0.8 x 0.7 cm) and right lobe (3 mm). -  Negative for extrathyroidal extension.  -  Background thyroid with chronic lymphocytic thyroiditis and nodular  thyroid hyperplasia with adenomatoid nodules. -  Surgical resection margins are free of malignancy. -  See synoptic report for details. D.  Left inferior parathyroid, parathyroidectomy:  -  Benign parathyroid tissue (20 mg). SYNOPTIC REPORT:  Report Name: Thyroid Gland - Resection,  Version: [de-identified],   Inst#: 1  Specimen    Procedure:  Total thyroidectomy  Tumor    Tumor Focality: Multifocal    Tumor Characteristics      Tumor Site: Left lobe      Histologic Type: Papillary carcinoma, classic (usual,                       conventional)      Tumor Size: 1.1 x 0.8 x 0.7 Centimeters (cm)      Extrathyroidal Extension: Not identified Angioinvasion (vascular invasion): Not identified      Lymphatic Invasion: Not identified      Margins: Uninvolved by carcinoma  Lymph Nodes    Regional Lymph Nodes: No lymph nodes submitted or found  Pathologic Stage Classification (pTNM, AJCC 8th Edition)    TNM Descriptors: m (multiple primary tumors)    Primary Tumor (pT): pT1b    Regional Lymph Nodes (pN): pNX  Comments      Second small focus of papillary thyroid carcinoma is present in the  right lobe measuring 3 mm. - US Head Neck Soft Tissue Thyroid; Future  -Thyroglobulin  -Thyroglobulin antibody  EXAM:  NM THYROID METASTASES LIMITED   EXAM: NM THYROID METAS UPTAKE ADDL       INDICATION: Malignant neoplasm of thyroid gland (CMS Dx); This patient is referred for the    evaluation of residual thyroid tissue post thyroidectomy. RADIOPHARMACEUTICAL:  Recombinant TSH 0.9 mg IM x 2, I-123 capsule 335 uCi PO       TECHNICAL:  A limited thyroid cancer workup was performed using the recombinant TSH protocol. Recombinant TSH was administered prior to the administration of iodine-123. Imaging of the    neck and chest was performed 24 hours following radioiodine-123 administration. A quantitative    uptake was also performed at the level of the neck. COMPARISON:  None           FINDINGS:        Images demonstrate a focal area of uptake in the anterior neck. The 24 hour radioiodine uptake at the level of the neck is 0.8%. 4/15/2022  Tg <0.1, antibodies negative  Whole body images demonstrate the expected normal concentration of the radioiodine-131 in the head, abdomen, pelvis and extremities. No abnormal areas of increased radioiodine are seen. Previously demonstrated focal radioiodine uptake at the anterior neck has resolved study. The 48-hour uptake at the level of the neck is 0.2%. 3. Obesity  Diet and exercise    4. Premature menopause  Calcium and vitamin D supplementation  DEXA scan    5.   Hashimoto's thyroiditis  TSH 2.34    6. Osteopenia  Calcium, vitamin D supplementation was discussed, written instructions were given how to calculate calcium in diet  Calcium, vitamin D  Counseled patient on risk fracture  10 year risk for major osteoporotic fracture is 3.8% and risk of a hip    fracture is 0.2%. 7.  Postsurgical hypothyroidism  Has more palpitations  Continue levothyroxine 0.200 mg daily  Total thyroidectomy 10/16/2020  TSH 2.1-2.4-2.34-6.84-0.97-1.27-0.63  -TSH  -Free T4  -Free T3    8. Postsurgical hypoparathyroidism  1000 mg Tums bid once in a while  Stopped Calcitriol, no symptoms  Vitamin D3 4000 IU   Magnesium citrate 1 caps daily  PTH 5.9-11.4  -CMP  -PTH  -Ionized calcium  -Phosphorus  -Magnesium  -25 hydroxy vitamin D    9. Hypomagnesemia  Magnesium 250 mg only taking one because of diarrhea  Magnesium 1.8-1.7-1.5-1.7-1.8  -Magnesium    10. Prediabetes  Glucose 114-104-116  HbA1C 6.2-6.3-6.1    11.   Hyperlipidemia  LDL -91  Triglycerides 290-259  HDL 26-21    Reviewed and/or ordered clinical lab results Yes  Reviewed and/or ordered radiology tests Yes   Reviewed and/or ordered other diagnostic tests No  Discussed test results with performing physician No  Independently reviewed image, tracing, or specimen No  Made a decision to obtain old records No  Reviewed and summarized old records Yes   Calcium elevated since 2015, later upper limit of normal, in 2020 elevated again  PTH 38  Calcium 11.1-10.5-10.2-10.1-10.8-11.3, upper limit normal 10.6  TSH 1.4  RADIOPHARMACEUTICAL:  I-131 capsule 52.1 mCi PO            Obtained history from other than patient No    Mackenzie Aragon was counseled regarding symptoms of primary hyperparathyroidism, thyroid cancer, postsurgical hypothyroidism, postsurgical hypoparathyroidism, hypomagnesemia, premature menopause diagnosis, course and complications of disease if inadequately treated, side effects of medications, diagnosis, treatment options, and prognosis, risks, benefits, complications, and alternatives of treatment, labs, imaging and other studies and treatment targets and goals, thyroid cancer management, surveillance, recurrence prevention, hypoparathyroidism management, follow-up and monitoring, medication adjustments    She understands instructions and counseling.    Return in about 4 months (around 7/2/2023) for thyroid problems.    Electronically signed by Teresita Hernandez MD on 3/2/2023 at 4:05 PM

## 2023-03-03 LAB — THYROGLOBULIN BY LC-MS/MS, SERUM/PLASMA: <0.5 NG/ML (ref 1.3–31.8)

## 2023-03-08 PROBLEM — G47.33 OSA (OBSTRUCTIVE SLEEP APNEA): Status: ACTIVE | Noted: 2023-03-08

## 2023-03-30 DIAGNOSIS — I10 ESSENTIAL HYPERTENSION: ICD-10-CM

## 2023-03-30 RX ORDER — LISINOPRIL AND HYDROCHLOROTHIAZIDE 12.5; 1 MG/1; MG/1
TABLET ORAL
Qty: 90 TABLET | Refills: 1 | Status: SHIPPED | OUTPATIENT
Start: 2023-03-30

## 2023-03-30 NOTE — TELEPHONE ENCOUNTER
Pete Mccall [address]  [address]  Phone: [phone] Fax: [phone]:   Requested Prescriptions     Pending Prescriptions Disp Refills    lisinopril-hydroCHLOROthiazide (PRINZIDE;ZESTORETIC) 10-12.5 MG per tablet [Pharmacy Med Name: LISINOPRIL-HCTZ 10-12.5 MG TAB] 90 tablet 0     Sig: TAKE ONE TABLET BY MOUTH DAILY       Last Filled:      Patient Phone Number: [phone] (home)     Last appt: 2/2/2023   Next appt: 7/24/2023    Last BMP:   Lab Results   Component Value Date/Time     02/25/2023 10:59 AM    K 4.3 02/25/2023 10:59 AM    CL 97 02/25/2023 10:59 AM    CO2 25 02/25/2023 10:59 AM    ANIONGAP 15 02/25/2023 10:59 AM    GLUCOSE 116 10/26/2022 03:21 PM    BUN 13 02/25/2023 10:59 AM    CREATININE 0.6 02/25/2023 10:59 AM    LABGLOM >60 02/25/2023 10:59 AM    GFRAA >60 09/20/2022 04:01 PM    CALCIUM 8.7 02/25/2023 10:59 AM      Last CMP:   Lab Results   Component Value Date/Time     02/25/2023 10:59 AM    K 4.3 02/25/2023 10:59 AM    CL 97 02/25/2023 10:59 AM    CO2 25 02/25/2023 10:59 AM    ANIONGAP 15 02/25/2023 10:59 AM    GLUCOSE 116 10/26/2022 03:21 PM    BUN 13 02/25/2023 10:59 AM    CREATININE 0.6 02/25/2023 10:59 AM    LABGLOM >60 02/25/2023 10:59 AM    GFRAA >60 09/20/2022 04:01 PM    PROT 7.1 02/25/2023 10:59 AM    LABALBU 4.5 02/25/2023 10:59 AM    AGRATIO 1.7 02/25/2023 10:59 AM    BILITOT 0.3 02/25/2023 10:59 AM    ALKPHOS 72 02/25/2023 10:59 AM    ALT 24 02/25/2023 10:59 AM    AST 36 02/25/2023 10:59 AM    GLOB 2.7 08/02/2021 02:07 PM     Last Renal Function:   Lab Results   Component Value Date/Time     02/25/2023 10:59 AM    K 4.3 02/25/2023 10:59 AM    CL 97 02/25/2023 10:59 AM    CO2 25 02/25/2023 10:59 AM    GLUCOSE 116 10/26/2022 03:21 PM    BUN 13 02/25/2023 10:59 AM    CREATININE 0.6 02/25/2023 10:59 AM    PHOS 3.2 02/25/2023 10:59 AM    LABALBU 4.5 02/25/2023 10:59 AM    CALCIUM 8.7 02/25/2023 10:59

## 2023-04-18 NOTE — TELEPHONE ENCOUNTER
Please inform patient calcium came normal 8.7, very similar to before when it was 8.8. Let me know if she is taking 0.25 mcg calcitriol every other day as we discussed. Methotrexate Counseling:  Patient counseled regarding adverse effects of methotrexate including but not limited to nausea, vomiting, abnormalities in liver function tests. Patients may develop mouth sores, rash, diarrhea, and abnormalities in blood counts. The patient understands that monitoring is required including LFT's and blood counts.  There is a rare possibility of scarring of the liver and lung problems that can occur when taking methotrexate. Persistent nausea, loss of appetite, pale stools, dark urine, cough, and shortness of breath should be reported immediately. Patient advised to discontinue methotrexate treatment at least three months before attempting to become pregnant.  I discussed the need for folate supplements while taking methotrexate.  These supplements can decrease side effects during methotrexate treatment. The patient verbalized understanding of the proper use and possible adverse effects of methotrexate.  All of the patient's questions and concerns were addressed.

## 2023-06-13 ENCOUNTER — OFFICE VISIT (OUTPATIENT)
Dept: PRIMARY CARE CLINIC | Age: 53
End: 2023-06-13
Payer: COMMERCIAL

## 2023-06-13 VITALS
HEART RATE: 80 BPM | SYSTOLIC BLOOD PRESSURE: 120 MMHG | TEMPERATURE: 98.2 F | BODY MASS INDEX: 47.84 KG/M2 | RESPIRATION RATE: 16 BRPM | HEIGHT: 62 IN | OXYGEN SATURATION: 98 % | WEIGHT: 260 LBS | DIASTOLIC BLOOD PRESSURE: 78 MMHG

## 2023-06-13 DIAGNOSIS — R10.33 PERIUMBILICAL PAIN: ICD-10-CM

## 2023-06-13 DIAGNOSIS — L08.9 LOCAL SKIN INFECTION: Primary | ICD-10-CM

## 2023-06-13 PROCEDURE — 3074F SYST BP LT 130 MM HG: CPT | Performed by: INTERNAL MEDICINE

## 2023-06-13 PROCEDURE — 3078F DIAST BP <80 MM HG: CPT | Performed by: INTERNAL MEDICINE

## 2023-06-13 PROCEDURE — 99213 OFFICE O/P EST LOW 20 MIN: CPT | Performed by: INTERNAL MEDICINE

## 2023-06-17 PROBLEM — R19.8 UMBILICAL DISCHARGE: Status: ACTIVE | Noted: 2023-06-17

## 2023-06-17 PROBLEM — L08.9 LOCAL SKIN INFECTION: Status: ACTIVE | Noted: 2023-06-17

## 2023-06-17 PROBLEM — R10.815 PERIUMBILICAL ABDOMINAL TENDERNESS WITHOUT REBOUND TENDERNESS: Status: ACTIVE | Noted: 2023-06-17

## 2023-06-19 DIAGNOSIS — E66.01 CLASS 3 SEVERE OBESITY WITH SERIOUS COMORBIDITY AND BODY MASS INDEX (BMI) OF 45.0 TO 49.9 IN ADULT, UNSPECIFIED OBESITY TYPE (HCC): ICD-10-CM

## 2023-06-19 DIAGNOSIS — E83.42 HYPOMAGNESEMIA: ICD-10-CM

## 2023-06-19 DIAGNOSIS — E89.2 POSTSURGICAL HYPOPARATHYROIDISM (HCC): ICD-10-CM

## 2023-06-19 DIAGNOSIS — E78.2 MIXED HYPERLIPIDEMIA: ICD-10-CM

## 2023-06-19 DIAGNOSIS — E89.0 HYPOTHYROIDISM, POSTSURGICAL: ICD-10-CM

## 2023-06-19 DIAGNOSIS — C73 THYROID CANCER (HCC): ICD-10-CM

## 2023-06-19 DIAGNOSIS — E28.319 PREMATURE MENOPAUSE: ICD-10-CM

## 2023-06-19 DIAGNOSIS — E21.0 HYPERPARATHYROIDISM, PRIMARY (HCC): ICD-10-CM

## 2023-06-19 DIAGNOSIS — R73.03 PREDIABETES: ICD-10-CM

## 2023-06-20 RX ORDER — LEVOTHYROXINE SODIUM 0.2 MG/1
TABLET ORAL
Qty: 90 TABLET | Refills: 0 | Status: SHIPPED | OUTPATIENT
Start: 2023-06-20 | End: 2023-07-18 | Stop reason: SDUPTHER

## 2023-06-22 PROBLEM — R10.33 PERIUMBILICAL PAIN: Status: ACTIVE | Noted: 2023-06-22

## 2023-06-22 ASSESSMENT — ENCOUNTER SYMPTOMS
ABDOMINAL PAIN: 1
SHORTNESS OF BREATH: 0
COLOR CHANGE: 1

## 2023-07-11 DIAGNOSIS — R10.33 PERIUMBILICAL PAIN: ICD-10-CM

## 2023-07-11 DIAGNOSIS — E89.2 POSTSURGICAL HYPOPARATHYROIDISM (HCC): ICD-10-CM

## 2023-07-11 DIAGNOSIS — L08.9 LOCAL SKIN INFECTION: ICD-10-CM

## 2023-07-11 DIAGNOSIS — E21.0 HYPERPARATHYROIDISM, PRIMARY (HCC): ICD-10-CM

## 2023-07-11 DIAGNOSIS — E78.2 MIXED HYPERLIPIDEMIA: ICD-10-CM

## 2023-07-11 DIAGNOSIS — R73.03 PREDIABETES: ICD-10-CM

## 2023-07-11 DIAGNOSIS — E83.42 HYPOMAGNESEMIA: ICD-10-CM

## 2023-07-11 DIAGNOSIS — E06.3 HASHIMOTO'S THYROIDITIS: ICD-10-CM

## 2023-07-11 DIAGNOSIS — C73 THYROID CANCER (HCC): ICD-10-CM

## 2023-07-11 DIAGNOSIS — E89.0 HYPOTHYROIDISM, POSTSURGICAL: ICD-10-CM

## 2023-07-11 LAB
25(OH)D3 SERPL-MCNC: 46.9 NG/ML
ANTI-THYROGLOB ABS: 11 IU/ML
BASOPHILS # BLD: 0.1 K/UL (ref 0–0.2)
BASOPHILS NFR BLD: 1 %
DEPRECATED RDW RBC AUTO: 15.8 % (ref 12.4–15.4)
EOSINOPHIL # BLD: 0.1 K/UL (ref 0–0.6)
EOSINOPHIL NFR BLD: 2.2 %
HCT VFR BLD AUTO: 34.6 % (ref 36–48)
HGB BLD-MCNC: 11.4 G/DL (ref 12–16)
LYMPHOCYTES # BLD: 1.5 K/UL (ref 1–5.1)
LYMPHOCYTES NFR BLD: 24 %
MCH RBC QN AUTO: 25.5 PG (ref 26–34)
MCHC RBC AUTO-ENTMCNC: 32.9 G/DL (ref 31–36)
MCV RBC AUTO: 77.5 FL (ref 80–100)
MONOCYTES # BLD: 0.4 K/UL (ref 0–1.3)
MONOCYTES NFR BLD: 6.3 %
NEUTROPHILS # BLD: 4.1 K/UL (ref 1.7–7.7)
NEUTROPHILS NFR BLD: 66.5 %
PLATELET # BLD AUTO: 345 K/UL (ref 135–450)
PMV BLD AUTO: 7.8 FL (ref 5–10.5)
RBC # BLD AUTO: 4.46 M/UL (ref 4–5.2)
T3FREE SERPL-MCNC: 2.5 PG/ML (ref 2.3–4.2)
T4 FREE SERPL-MCNC: 2.3 NG/DL (ref 0.9–1.8)
TSH SERPL DL<=0.005 MIU/L-ACNC: 0.62 UIU/ML (ref 0.27–4.2)
WBC # BLD AUTO: 6.2 K/UL (ref 4–11)

## 2023-07-12 LAB
ALBUMIN SERPL-MCNC: 4.2 G/DL (ref 3.4–5)
ALBUMIN/GLOB SERPL: 1.6 {RATIO} (ref 1.1–2.2)
ALP SERPL-CCNC: 71 U/L (ref 40–129)
ALT SERPL-CCNC: 28 U/L (ref 10–40)
ANION GAP SERPL CALCULATED.3IONS-SCNC: 13 MMOL/L (ref 3–16)
AST SERPL-CCNC: 26 U/L (ref 15–37)
BILIRUB SERPL-MCNC: <0.2 MG/DL (ref 0–1)
BUN SERPL-MCNC: 14 MG/DL (ref 7–20)
CALCIUM SERPL-MCNC: 8.6 MG/DL (ref 8.3–10.6)
CHLORIDE SERPL-SCNC: 101 MMOL/L (ref 99–110)
CO2 SERPL-SCNC: 25 MMOL/L (ref 21–32)
CREAT SERPL-MCNC: 0.7 MG/DL (ref 0.6–1.1)
GFR SERPLBLD CREATININE-BSD FMLA CKD-EPI: >60 ML/MIN/{1.73_M2}
GLUCOSE SERPL-MCNC: 106 MG/DL (ref 70–99)
MAGNESIUM SERPL-MCNC: 1.7 MG/DL (ref 1.8–2.4)
PHOSPHATE SERPL-MCNC: 3.1 MG/DL (ref 2.5–4.9)
POTASSIUM SERPL-SCNC: 4.2 MMOL/L (ref 3.5–5.1)
PROT SERPL-MCNC: 6.8 G/DL (ref 6.4–8.2)
PTH-INTACT SERPL-MCNC: 16.7 PG/ML (ref 14–72)
SODIUM SERPL-SCNC: 139 MMOL/L (ref 136–145)

## 2023-07-13 LAB
CA-I ADJ PH7.4 SERPL-SCNC: 1.13 MMOL/L (ref 1.09–1.3)
CA-I SERPL ISE-SCNC: 1.12 MMOL/L (ref 1.09–1.3)

## 2023-07-15 LAB — THYROGLOB SERPL-MCNC: <0.5 NG/ML (ref 1.3–31.8)

## 2023-07-18 ENCOUNTER — OFFICE VISIT (OUTPATIENT)
Dept: ENDOCRINOLOGY | Age: 53
End: 2023-07-18
Payer: COMMERCIAL

## 2023-07-18 VITALS
HEIGHT: 62 IN | RESPIRATION RATE: 14 BRPM | OXYGEN SATURATION: 99 % | BODY MASS INDEX: 48.03 KG/M2 | TEMPERATURE: 98 F | WEIGHT: 261 LBS | DIASTOLIC BLOOD PRESSURE: 97 MMHG | HEART RATE: 76 BPM | SYSTOLIC BLOOD PRESSURE: 143 MMHG

## 2023-07-18 DIAGNOSIS — E28.319 PREMATURE MENOPAUSE: ICD-10-CM

## 2023-07-18 DIAGNOSIS — E06.3 HASHIMOTO'S THYROIDITIS: ICD-10-CM

## 2023-07-18 DIAGNOSIS — E66.01 CLASS 3 SEVERE OBESITY WITH SERIOUS COMORBIDITY AND BODY MASS INDEX (BMI) OF 45.0 TO 49.9 IN ADULT, UNSPECIFIED OBESITY TYPE (HCC): ICD-10-CM

## 2023-07-18 DIAGNOSIS — E89.0 HYPOTHYROIDISM, POSTSURGICAL: ICD-10-CM

## 2023-07-18 DIAGNOSIS — E89.2 POSTSURGICAL HYPOPARATHYROIDISM (HCC): ICD-10-CM

## 2023-07-18 DIAGNOSIS — E78.2 MIXED HYPERLIPIDEMIA: ICD-10-CM

## 2023-07-18 DIAGNOSIS — R73.03 PREDIABETES: ICD-10-CM

## 2023-07-18 DIAGNOSIS — E21.0 HYPERPARATHYROIDISM, PRIMARY (HCC): Primary | ICD-10-CM

## 2023-07-18 DIAGNOSIS — E83.42 HYPOMAGNESEMIA: ICD-10-CM

## 2023-07-18 DIAGNOSIS — M85.80 OSTEOPENIA, UNSPECIFIED LOCATION: ICD-10-CM

## 2023-07-18 DIAGNOSIS — C73 THYROID CANCER (HCC): ICD-10-CM

## 2023-07-18 PROBLEM — E66.813 CLASS 3 SEVERE OBESITY WITH SERIOUS COMORBIDITY AND BODY MASS INDEX (BMI) OF 45.0 TO 49.9 IN ADULT: Status: ACTIVE | Noted: 2023-07-18

## 2023-07-18 PROCEDURE — 99214 OFFICE O/P EST MOD 30 MIN: CPT | Performed by: INTERNAL MEDICINE

## 2023-07-18 PROCEDURE — 3077F SYST BP >= 140 MM HG: CPT | Performed by: INTERNAL MEDICINE

## 2023-07-18 PROCEDURE — 3080F DIAST BP >= 90 MM HG: CPT | Performed by: INTERNAL MEDICINE

## 2023-07-18 RX ORDER — LEVOTHYROXINE SODIUM 0.2 MG/1
200 TABLET ORAL DAILY
Qty: 90 TABLET | Refills: 1 | Status: SHIPPED | OUTPATIENT
Start: 2023-07-18

## 2023-07-18 RX ORDER — FLUOROURACIL 50 MG/G
CREAM TOPICAL 2 TIMES DAILY
COMMUNITY

## 2023-07-18 NOTE — PROGRESS NOTES
SUBJECTIVE:  Corazon Bocanegra is a 47 y.o. female who is being evaluated for hyperparathyroidism. 1. Hyperparathyroidism, primary   This started in 2020. Patient was diagnosed with hypercalcemia. The problem has been gradually worsening. Patient started medication in 2020. Currently patient is on: N/A. Misses  N/A doses a month. Current complaints: fatigue, easy gets hot  No history of kidney stones  No persistent constipation, aches, depression  Subtotal parathyroidectomy 10/16/2020, right superior, left superior, left inferior    2. Thyroid cancer  EXAM: NM THYROID CARCINOMA ABLATION        INDICATION: Malignant neoplasm of thyroid gland (CMS Dx) 39-year-old female with a history of    papillary thyroid cancer status post thyroidectomy here for postthyroidectomy radioablation. RADIOPHARMACEUTICAL:  I-131 capsule 52.1 mCi PO          10/16/2020  Thyroid, total thyroidectomy:  -  Multifocal papillary thyroid carcinoma, classic type, involving the left  lobe (1.1 x 0.8 x 0.7 cm) and right lobe (3 mm). -  Negative for extrathyroidal extension.  -  Background thyroid with chronic lymphocytic thyroiditis and nodular  thyroid hyperplasia with adenomatoid nodules. -  Surgical resection margins are free of malignancy. History of obstructive symptoms: difficulty swallowing No, changes in voice/hoarseness No.  History of radiation to patient's neck: No  Resent iodine exposure: No  Family history includes no thyroid abnormalities. Family history of thyroid cancer: No    3. Premature menopause  Menopause since age 38  Was on Estrogen    4. Obesity  He is moderately healthy, active    5. Hashimoto's thyroiditis  Has fatigue    6. Osteopenia  Calcium in diet  No bone pain    7. Postsurgical hypoparathyroidism  Has numbness, tingling in hands. Not in face anymore, had after surgery    8. Hypomagnesemia  Has fatigue    9. Prediabetes  Mother had sugar problem    10.   Postsurgical hypothyroidism  Has

## 2023-07-24 ENCOUNTER — OFFICE VISIT (OUTPATIENT)
Dept: PRIMARY CARE CLINIC | Age: 53
End: 2023-07-24
Payer: COMMERCIAL

## 2023-07-24 VITALS
DIASTOLIC BLOOD PRESSURE: 72 MMHG | BODY MASS INDEX: 47.74 KG/M2 | WEIGHT: 261 LBS | SYSTOLIC BLOOD PRESSURE: 136 MMHG | OXYGEN SATURATION: 98 % | HEART RATE: 76 BPM

## 2023-07-24 DIAGNOSIS — I10 ESSENTIAL HYPERTENSION: Primary | ICD-10-CM

## 2023-07-24 DIAGNOSIS — K62.89 ANAL BURNING: ICD-10-CM

## 2023-07-24 DIAGNOSIS — R73.03 PREDIABETES: ICD-10-CM

## 2023-07-24 DIAGNOSIS — E78.2 MIXED HYPERLIPIDEMIA: ICD-10-CM

## 2023-07-24 DIAGNOSIS — K21.9 GASTROESOPHAGEAL REFLUX DISEASE, UNSPECIFIED WHETHER ESOPHAGITIS PRESENT: ICD-10-CM

## 2023-07-24 DIAGNOSIS — J30.9 ALLERGIC RHINITIS, UNSPECIFIED SEASONALITY, UNSPECIFIED TRIGGER: ICD-10-CM

## 2023-07-24 DIAGNOSIS — F41.9 ANXIETY: ICD-10-CM

## 2023-07-24 DIAGNOSIS — D64.9 ANEMIA, UNSPECIFIED TYPE: ICD-10-CM

## 2023-07-24 DIAGNOSIS — L29.0 ANAL ITCHING: ICD-10-CM

## 2023-07-24 DIAGNOSIS — K62.5 RECTAL BLEEDING: ICD-10-CM

## 2023-07-24 PROCEDURE — 3078F DIAST BP <80 MM HG: CPT | Performed by: INTERNAL MEDICINE

## 2023-07-24 PROCEDURE — 99214 OFFICE O/P EST MOD 30 MIN: CPT | Performed by: INTERNAL MEDICINE

## 2023-07-24 PROCEDURE — 3074F SYST BP LT 130 MM HG: CPT | Performed by: INTERNAL MEDICINE

## 2023-07-24 RX ORDER — FENOFIBRATE 145 MG/1
145 TABLET, COATED ORAL DAILY
Qty: 90 TABLET | Refills: 1 | Status: SHIPPED | OUTPATIENT
Start: 2023-07-24

## 2023-07-24 ASSESSMENT — ENCOUNTER SYMPTOMS
NAUSEA: 0
SORE THROAT: 0
CHEST TIGHTNESS: 0
ABDOMINAL PAIN: 0
VOMITING: 0
SINUS PAIN: 0
COUGH: 0
SINUS PRESSURE: 0
CONSTIPATION: 0
WHEEZING: 0
SHORTNESS OF BREATH: 0
RHINORRHEA: 0

## 2023-07-24 NOTE — PROGRESS NOTES
Mood and Affect: Mood normal.       No results found for this visit on 07/24/23.   Lab Review   Orders Only on 07/11/2023   Component Date Value    Magnesium 07/11/2023 1.70 (L)     Phosphorus 07/11/2023 3.1     PTH 07/11/2023 16.7     Calcium, Ionized 07/11/2023 1.12     Calcium,Ion ph 7.4 07/11/2023 1.13     Vit D, 25-Hydroxy 07/11/2023 46.9     Anti-Thyroglob Abs 07/11/2023 11     Thyroglobulin by LC-MS/M* 07/11/2023 <0.5 (L)     TSH 07/11/2023 0.62     T4 Free 07/11/2023 2.3 (H)     T3, Free 07/11/2023 2.5     Sodium 07/11/2023 139     Potassium 07/11/2023 4.2     Chloride 07/11/2023 101     CO2 07/11/2023 25     Anion Gap 07/11/2023 13     Glucose 07/11/2023 106 (H)     BUN 07/11/2023 14     Creatinine 07/11/2023 0.7     Est, Glom Filt Rate 07/11/2023 >60     Calcium 07/11/2023 8.6     Total Protein 07/11/2023 6.8     Albumin 07/11/2023 4.2     Albumin/Globulin Ratio 07/11/2023 1.6     Total Bilirubin 07/11/2023 <0.2     Alkaline Phosphatase 07/11/2023 71     ALT 07/11/2023 28     AST 07/11/2023 26     WBC 07/11/2023 6.2     RBC 07/11/2023 4.46     Hemoglobin 07/11/2023 11.4 (L)     Hematocrit 07/11/2023 34.6 (L)     MCV 07/11/2023 77.5 (L)     MCH 07/11/2023 25.5 (L)     MCHC 07/11/2023 32.9     RDW 07/11/2023 15.8 (H)     Platelets 94/83/9950 345     MPV 07/11/2023 7.8     Neutrophils % 07/11/2023 66.5     Lymphocytes % 07/11/2023 24.0     Monocytes % 07/11/2023 6.3     Eosinophils % 07/11/2023 2.2     Basophils % 07/11/2023 1.0     Neutrophils Absolute 07/11/2023 4.1     Lymphocytes Absolute 07/11/2023 1.5     Monocytes Absolute 07/11/2023 0.4     Eosinophils Absolute 07/11/2023 0.1     Basophils Absolute 07/11/2023 0.1    Orders Only on 02/27/2023   Component Date Value    Color, UA 02/25/2023 Yellow     Clarity, UA 02/25/2023 Clear     Glucose, Ur 02/25/2023 Negative     Bilirubin Urine 02/25/2023 Negative     Ketones, Urine 02/25/2023 Negative     Specific Gravity, UA 02/25/2023 1.011     Blood, Urine

## 2023-07-31 DIAGNOSIS — E78.2 MIXED HYPERLIPIDEMIA: ICD-10-CM

## 2023-07-31 DIAGNOSIS — R73.03 PREDIABETES: ICD-10-CM

## 2023-07-31 DIAGNOSIS — D64.9 ANEMIA, UNSPECIFIED TYPE: ICD-10-CM

## 2023-07-31 PROBLEM — L29.0 ANAL ITCHING: Status: ACTIVE | Noted: 2023-07-31

## 2023-07-31 PROBLEM — K62.5 RECTAL BLEEDING: Status: ACTIVE | Noted: 2023-07-31

## 2023-07-31 PROBLEM — K62.89 ANAL BURNING: Status: ACTIVE | Noted: 2023-07-31

## 2023-07-31 LAB
CHOLEST SERPL-MCNC: 143 MG/DL (ref 0–199)
EST. AVERAGE GLUCOSE BLD GHB EST-MCNC: 128.4 MG/DL
HBA1C MFR BLD: 6.1 %
HDLC SERPL-MCNC: 28 MG/DL (ref 40–60)
IRON SATN MFR SERPL: 11 % (ref 15–50)
IRON SERPL-MCNC: 43 UG/DL (ref 37–145)
LDLC SERPL CALC-MCNC: 73 MG/DL
TIBC SERPL-MCNC: 402 UG/DL (ref 260–445)
TRIGL SERPL-MCNC: 209 MG/DL (ref 0–150)
VLDLC SERPL CALC-MCNC: 42 MG/DL

## 2023-07-31 ASSESSMENT — ENCOUNTER SYMPTOMS
BLOOD IN STOOL: 1
DIARRHEA: 0

## 2023-08-01 ENCOUNTER — OFFICE VISIT (OUTPATIENT)
Dept: PRIMARY CARE CLINIC | Age: 53
End: 2023-08-01
Payer: COMMERCIAL

## 2023-08-01 VITALS
SYSTOLIC BLOOD PRESSURE: 128 MMHG | BODY MASS INDEX: 48.49 KG/M2 | HEART RATE: 90 BPM | OXYGEN SATURATION: 98 % | TEMPERATURE: 98.6 F | HEIGHT: 62 IN | WEIGHT: 263.5 LBS | DIASTOLIC BLOOD PRESSURE: 82 MMHG

## 2023-08-01 DIAGNOSIS — N39.0 URINARY TRACT INFECTION WITH HEMATURIA, SITE UNSPECIFIED: Primary | ICD-10-CM

## 2023-08-01 DIAGNOSIS — R31.9 URINARY TRACT INFECTION WITH HEMATURIA, SITE UNSPECIFIED: Primary | ICD-10-CM

## 2023-08-01 DIAGNOSIS — R30.0 DYSURIA: ICD-10-CM

## 2023-08-01 LAB
BILIRUBIN, POC: ABNORMAL
BLOOD URINE, POC: ABNORMAL
CLARITY, POC: ABNORMAL
COLOR, POC: YELLOW
GLUCOSE URINE, POC: ABNORMAL
KETONES, POC: ABNORMAL
LEUKOCYTE EST, POC: ABNORMAL
NITRITE, POC: ABNORMAL
PH, POC: 7.5
PROTEIN, POC: ABNORMAL
SPECIFIC GRAVITY, POC: 1.01
UROBILINOGEN, POC: 0.2

## 2023-08-01 PROCEDURE — 3074F SYST BP LT 130 MM HG: CPT | Performed by: NURSE PRACTITIONER

## 2023-08-01 PROCEDURE — 99213 OFFICE O/P EST LOW 20 MIN: CPT | Performed by: NURSE PRACTITIONER

## 2023-08-01 PROCEDURE — 81002 URINALYSIS NONAUTO W/O SCOPE: CPT | Performed by: NURSE PRACTITIONER

## 2023-08-01 PROCEDURE — 3079F DIAST BP 80-89 MM HG: CPT | Performed by: NURSE PRACTITIONER

## 2023-08-01 RX ORDER — PHENAZOPYRIDINE HYDROCHLORIDE 200 MG/1
200 TABLET, FILM COATED ORAL 3 TIMES DAILY PRN
Qty: 9 TABLET | Refills: 0 | Status: SHIPPED | OUTPATIENT
Start: 2023-08-01 | End: 2023-08-04

## 2023-08-01 RX ORDER — CIPROFLOXACIN 250 MG/1
250 TABLET, FILM COATED ORAL 2 TIMES DAILY
Qty: 14 TABLET | Refills: 0 | Status: SHIPPED | OUTPATIENT
Start: 2023-08-01 | End: 2023-08-08

## 2023-08-01 ASSESSMENT — ENCOUNTER SYMPTOMS
DIARRHEA: 0
BACK PAIN: 1
VOMITING: 0
WHEEZING: 0
NAUSEA: 0
SHORTNESS OF BREATH: 0
COUGH: 0

## 2023-08-04 LAB
BACTERIA UR CULT: ABNORMAL
ORGANISM: ABNORMAL

## 2023-09-26 DIAGNOSIS — I10 ESSENTIAL HYPERTENSION: ICD-10-CM

## 2023-09-26 RX ORDER — LISINOPRIL AND HYDROCHLOROTHIAZIDE 12.5; 1 MG/1; MG/1
TABLET ORAL
Qty: 90 TABLET | Refills: 1 | Status: SHIPPED | OUTPATIENT
Start: 2023-09-26

## 2023-09-26 NOTE — TELEPHONE ENCOUNTER
Medication:   Requested Prescriptions     Pending Prescriptions Disp Refills    lisinopril-hydroCHLOROthiazide (PRINZIDE;ZESTORETIC) 10-12.5 MG per tablet [Pharmacy Med Name: LISINOPRIL-HCTZ 10-12.5 MG TAB] 90 tablet 1     Sig: TAKE ONE TABLET BY MOUTH DAILY     Last Filled:  3/30/2023    Last appt: 8/1/2023   Next appt: Visit date not found    Last OARRS:        No data to display

## 2024-01-15 DIAGNOSIS — E21.0 HYPERPARATHYROIDISM, PRIMARY (HCC): ICD-10-CM

## 2024-01-15 DIAGNOSIS — E89.2 POSTSURGICAL HYPOPARATHYROIDISM (HCC): ICD-10-CM

## 2024-01-15 DIAGNOSIS — E28.319 PREMATURE MENOPAUSE: ICD-10-CM

## 2024-01-15 DIAGNOSIS — R73.03 PREDIABETES: ICD-10-CM

## 2024-01-15 DIAGNOSIS — E83.42 HYPOMAGNESEMIA: ICD-10-CM

## 2024-01-15 DIAGNOSIS — E06.3 HASHIMOTO'S THYROIDITIS: ICD-10-CM

## 2024-01-15 DIAGNOSIS — M85.80 OSTEOPENIA, UNSPECIFIED LOCATION: ICD-10-CM

## 2024-01-15 DIAGNOSIS — E89.0 HYPOTHYROIDISM, POSTSURGICAL: ICD-10-CM

## 2024-01-15 DIAGNOSIS — E78.2 MIXED HYPERLIPIDEMIA: ICD-10-CM

## 2024-01-15 DIAGNOSIS — C73 THYROID CANCER (HCC): ICD-10-CM

## 2024-01-15 LAB
25(OH)D3 SERPL-MCNC: 58.9 NG/ML
ALBUMIN SERPL-MCNC: 4.7 G/DL (ref 3.4–5)
ALBUMIN/GLOB SERPL: 2 {RATIO} (ref 1.1–2.2)
ALP SERPL-CCNC: 77 U/L (ref 40–129)
ALT SERPL-CCNC: 27 U/L (ref 10–40)
ANION GAP SERPL CALCULATED.3IONS-SCNC: 10 MMOL/L (ref 3–16)
ANTI-THYROGLOB ABS: 11 IU/ML
AST SERPL-CCNC: 30 U/L (ref 15–37)
BILIRUB SERPL-MCNC: 0.3 MG/DL (ref 0–1)
BUN SERPL-MCNC: 13 MG/DL (ref 7–20)
CALCIUM SERPL-MCNC: 8.6 MG/DL (ref 8.3–10.6)
CHLORIDE SERPL-SCNC: 103 MMOL/L (ref 99–110)
CO2 SERPL-SCNC: 27 MMOL/L (ref 21–32)
CREAT SERPL-MCNC: 0.6 MG/DL (ref 0.6–1.1)
GFR SERPLBLD CREATININE-BSD FMLA CKD-EPI: >60 ML/MIN/{1.73_M2}
GLUCOSE SERPL-MCNC: 89 MG/DL (ref 70–99)
MAGNESIUM SERPL-MCNC: 1.7 MG/DL (ref 1.8–2.4)
PHOSPHATE SERPL-MCNC: 3.2 MG/DL (ref 2.5–4.9)
POTASSIUM SERPL-SCNC: 4.1 MMOL/L (ref 3.5–5.1)
PROT SERPL-MCNC: 7.1 G/DL (ref 6.4–8.2)
PTH-INTACT SERPL-MCNC: 18.4 PG/ML (ref 14–72)
SODIUM SERPL-SCNC: 140 MMOL/L (ref 136–145)
T3FREE SERPL-MCNC: 2.3 PG/ML (ref 2.3–4.2)
T4 FREE SERPL-MCNC: 1.9 NG/DL (ref 0.9–1.8)
TSH SERPL DL<=0.005 MIU/L-ACNC: 1.66 UIU/ML (ref 0.27–4.2)

## 2024-01-16 ENCOUNTER — TELEPHONE (OUTPATIENT)
Dept: ENDOCRINOLOGY | Age: 54
End: 2024-01-16

## 2024-01-16 DIAGNOSIS — C73 THYROID CANCER (HCC): ICD-10-CM

## 2024-01-16 DIAGNOSIS — E89.2 POSTSURGICAL HYPOPARATHYROIDISM (HCC): ICD-10-CM

## 2024-01-16 DIAGNOSIS — M85.80 OSTEOPENIA, UNSPECIFIED LOCATION: ICD-10-CM

## 2024-01-16 DIAGNOSIS — E21.0 HYPERPARATHYROIDISM, PRIMARY (HCC): ICD-10-CM

## 2024-01-16 DIAGNOSIS — E66.01 CLASS 3 SEVERE OBESITY WITH SERIOUS COMORBIDITY AND BODY MASS INDEX (BMI) OF 45.0 TO 49.9 IN ADULT, UNSPECIFIED OBESITY TYPE (HCC): ICD-10-CM

## 2024-01-16 DIAGNOSIS — R73.03 PREDIABETES: ICD-10-CM

## 2024-01-16 DIAGNOSIS — E28.319 PREMATURE MENOPAUSE: ICD-10-CM

## 2024-01-16 DIAGNOSIS — E06.3 HASHIMOTO'S THYROIDITIS: ICD-10-CM

## 2024-01-16 DIAGNOSIS — E78.2 MIXED HYPERLIPIDEMIA: ICD-10-CM

## 2024-01-16 DIAGNOSIS — E83.42 HYPOMAGNESEMIA: ICD-10-CM

## 2024-01-16 DIAGNOSIS — E89.0 HYPOTHYROIDISM, POSTSURGICAL: ICD-10-CM

## 2024-01-16 NOTE — TELEPHONE ENCOUNTER
Ellyn andres had 3:30 appt today. She arrived at 3:18 PM and saw that MD was about an hr behind. She stated she was unable to wait to be seen. Cx her appt. Please advise on labs.

## 2024-01-16 NOTE — TELEPHONE ENCOUNTER
Please inform patient:  Calcium and vitamin D are okay.  TSH because of cancer should be a little lower.  Ask if she is not missing any pills.  If not, needs to increase levothyroxine to 0.2 mg 6 days a week and 1 and half tablet 1 day a week.  If missed any, then continue the same dose.  Magnesium remains 1.7, slightly below.  If she can tolerate higher dose of magnesium, take slightly higher dose like 2 tablets daily, if cannot tolerate because of diarrhea, keep with the same magnesium and increase magnesium in diet.    Please update levothyroxine prescription if she agrees to take higher dose and sent prescription to pharmacy.    Thyroid cancer test thyroglobulin pending.

## 2024-01-18 DIAGNOSIS — F41.9 ANXIETY: ICD-10-CM

## 2024-01-18 LAB
CA-I ADJ PH7.4 SERPL-SCNC: 1.16 MMOL/L (ref 1.09–1.3)
CA-I SERPL ISE-SCNC: 1.14 MMOL/L (ref 1.09–1.3)

## 2024-01-18 RX ORDER — LEVOTHYROXINE SODIUM 0.2 MG/1
TABLET ORAL
Qty: 95 TABLET | Refills: 1 | Status: SHIPPED | OUTPATIENT
Start: 2024-01-18

## 2024-01-18 NOTE — TELEPHONE ENCOUNTER
Medication:   Requested Prescriptions     Pending Prescriptions Disp Refills    sertraline (ZOLOFT) 50 MG tablet [Pharmacy Med Name: SERTRALINE HCL 50 MG TABLET] 90 tablet 1     Sig: TAKE 1 TABLET BY MOUTH DAILY     Last Filled:  7.24.23    Last appt: 8/1/2023   Next appt: Voicemail left for patient to call and schedule OV. Along with Physical for later date.     Last OARRS:        No data to display

## 2024-01-22 DIAGNOSIS — E78.2 MIXED HYPERLIPIDEMIA: ICD-10-CM

## 2024-01-22 NOTE — TELEPHONE ENCOUNTER
Medication:   Requested Prescriptions     Pending Prescriptions Disp Refills    fenofibrate (TRICOR) 145 MG tablet [Pharmacy Med Name: FENOFIBRATE 145 MG TABLET] 90 tablet 1     Sig: TAKE 1 TABLET BY MOUTH DAILY     Last Filled:  7.24.23    Last appt: 8/1/2023   Next appt: 3/5/2024    Last Lipid:   Lab Results   Component Value Date/Time    CHOL 143 07/31/2023 09:28 AM    TRIG 209 07/31/2023 09:28 AM    HDL 28 07/31/2023 09:28 AM    LDLCALC 73 07/31/2023 09:28 AM

## 2024-01-23 RX ORDER — FENOFIBRATE 145 MG/1
145 TABLET, COATED ORAL DAILY
Qty: 90 TABLET | Refills: 0 | Status: SHIPPED | OUTPATIENT
Start: 2024-01-23

## 2024-03-05 ENCOUNTER — OFFICE VISIT (OUTPATIENT)
Dept: PRIMARY CARE CLINIC | Age: 54
End: 2024-03-05
Payer: COMMERCIAL

## 2024-03-05 VITALS
OXYGEN SATURATION: 97 % | RESPIRATION RATE: 16 BRPM | SYSTOLIC BLOOD PRESSURE: 130 MMHG | BODY MASS INDEX: 47.29 KG/M2 | HEART RATE: 87 BPM | DIASTOLIC BLOOD PRESSURE: 70 MMHG | HEIGHT: 62 IN | TEMPERATURE: 97.3 F | WEIGHT: 257 LBS

## 2024-03-05 DIAGNOSIS — E21.0 HYPERPARATHYROIDISM, PRIMARY (HCC): ICD-10-CM

## 2024-03-05 DIAGNOSIS — R73.03 PREDIABETES: ICD-10-CM

## 2024-03-05 DIAGNOSIS — I10 ESSENTIAL HYPERTENSION: Primary | ICD-10-CM

## 2024-03-05 DIAGNOSIS — J30.9 ALLERGIC RHINITIS, UNSPECIFIED SEASONALITY, UNSPECIFIED TRIGGER: ICD-10-CM

## 2024-03-05 DIAGNOSIS — E78.2 MIXED HYPERLIPIDEMIA: ICD-10-CM

## 2024-03-05 DIAGNOSIS — C73 THYROID CANCER (HCC): ICD-10-CM

## 2024-03-05 DIAGNOSIS — Z23 NEED FOR INFLUENZA VACCINATION: ICD-10-CM

## 2024-03-05 DIAGNOSIS — F41.9 ANXIETY: ICD-10-CM

## 2024-03-05 PROCEDURE — 90471 IMMUNIZATION ADMIN: CPT | Performed by: INTERNAL MEDICINE

## 2024-03-05 PROCEDURE — 3078F DIAST BP <80 MM HG: CPT | Performed by: INTERNAL MEDICINE

## 2024-03-05 PROCEDURE — 90674 CCIIV4 VAC NO PRSV 0.5 ML IM: CPT | Performed by: INTERNAL MEDICINE

## 2024-03-05 PROCEDURE — 99214 OFFICE O/P EST MOD 30 MIN: CPT | Performed by: INTERNAL MEDICINE

## 2024-03-05 PROCEDURE — 3075F SYST BP GE 130 - 139MM HG: CPT | Performed by: INTERNAL MEDICINE

## 2024-03-05 RX ORDER — LISINOPRIL AND HYDROCHLOROTHIAZIDE 12.5; 1 MG/1; MG/1
1 TABLET ORAL DAILY
Qty: 90 TABLET | Refills: 1 | Status: SHIPPED | OUTPATIENT
Start: 2024-03-05

## 2024-03-05 RX ORDER — FENOFIBRATE 145 MG/1
145 TABLET, COATED ORAL DAILY
Qty: 90 TABLET | Refills: 1 | Status: SHIPPED | OUTPATIENT
Start: 2024-03-05

## 2024-03-05 RX ORDER — NAPROXEN 500 MG/1
TABLET ORAL
COMMUNITY
Start: 2023-02-14

## 2024-03-05 RX ORDER — NICOTINE POLACRILEX 4 MG/1
20 GUM, CHEWING ORAL DAILY
COMMUNITY
Start: 2015-03-18

## 2024-03-05 ASSESSMENT — PATIENT HEALTH QUESTIONNAIRE - PHQ9
SUM OF ALL RESPONSES TO PHQ9 QUESTIONS 1 & 2: 0
1. LITTLE INTEREST OR PLEASURE IN DOING THINGS: 0
SUM OF ALL RESPONSES TO PHQ QUESTIONS 1-9: 0
2. FEELING DOWN, DEPRESSED OR HOPELESS: 0
SUM OF ALL RESPONSES TO PHQ QUESTIONS 1-9: 0

## 2024-03-05 NOTE — PROGRESS NOTES
Date of Visit: 3/5/2024    Mackenzie Salmon (:  1970) is a 53 y.o. female,  Established patient here for evaluation of the following chief complaint(s):  Hypertension (Patient is here for a f/u )      ASSESSMENT/PLAN:    1. Essential hypertension  -stable  -Continue lisinopril-hydroCHLOROthiazide (PRINZIDE;ZESTORETIC) 10-12.5 MG per tablet; Take 1 tablet by mouth daily  Dispense: 90 tablet; Refill: 1  -Low sodium diet  -Regular aerobic exercise    2. Anxiety  -stable  -Continue sertraline (ZOLOFT) 50 MG tablet; Take 1 tablet by mouth daily  Dispense: 90 tablet; Refill: 1    3. Prediabetes  -stable  -Low carbohydrate diet  -Regular aerobic exercise  -Hemoglobin A1C; Future    4. Mixed hyperlipidemia  -stable  -Continue fenofibrate (TRICOR) 145 MG tablet; Take 1 tablet by mouth daily  Dispense: 90 tablet; Refill: 1  -Low fat, low cholesterol diet  -Regular aerobic exercise  -Lipid Panel; Future    5. Allergic rhinitis, unspecified seasonality, unspecified trigger  -stable  -Continue Flonase nasal spray 2 sprays each nostril once daily  -Continue Claritin 10mg once daily    6. Body mass index (BMI) 45.0-49.9, adult ([facility])  -BMI 47.01  -patient is working on diet  -patient declines intervention    7. Hyperparathyroidism, primary ([facility])  -stable  -s/p parathyroidectomy 10/16/20  -Continue care per Endocrinology, Dr. Teresita Hernandez    8. Thyroid cancer ([facility])  -s/p total thyroidectomy 10/16/20  -Continue Levothyroxine for postoperative hypothyroidism prescribed by Endocrinology, Dr. Teresita Hernandez    9. Need for influenza vaccination  - Influenza, FLUCELVAX, (age 6 mo+), IM, Preservative Free, 0.5 mL given          Return in about 5 months (around 2024) for annual physical exam.    SUBJECTIVE:    Patient has Hypertension. Patient takes Lisinopril-HCTZ 10-12.5mg once daily.  Patient decreases salt.  Patient does not exercise. Patient states she does a lot of running at work.     Patient has anxiety. Patient

## 2024-03-11 DIAGNOSIS — E83.42 HYPOMAGNESEMIA: ICD-10-CM

## 2024-03-11 DIAGNOSIS — E21.0 HYPERPARATHYROIDISM, PRIMARY (HCC): ICD-10-CM

## 2024-03-11 DIAGNOSIS — C73 THYROID CANCER (HCC): ICD-10-CM

## 2024-03-11 DIAGNOSIS — E66.01 CLASS 3 SEVERE OBESITY WITH SERIOUS COMORBIDITY AND BODY MASS INDEX (BMI) OF 45.0 TO 49.9 IN ADULT, UNSPECIFIED OBESITY TYPE (HCC): ICD-10-CM

## 2024-03-11 DIAGNOSIS — E28.319 PREMATURE MENOPAUSE: ICD-10-CM

## 2024-03-11 DIAGNOSIS — E78.2 MIXED HYPERLIPIDEMIA: ICD-10-CM

## 2024-03-11 DIAGNOSIS — E89.2 POSTSURGICAL HYPOPARATHYROIDISM (HCC): ICD-10-CM

## 2024-03-11 DIAGNOSIS — R73.03 PREDIABETES: ICD-10-CM

## 2024-03-11 DIAGNOSIS — E89.0 HYPOTHYROIDISM, POSTSURGICAL: ICD-10-CM

## 2024-03-11 DIAGNOSIS — E06.3 HASHIMOTO'S THYROIDITIS: ICD-10-CM

## 2024-03-11 DIAGNOSIS — M85.80 OSTEOPENIA, UNSPECIFIED LOCATION: ICD-10-CM

## 2024-03-11 RX ORDER — LEVOTHYROXINE SODIUM 0.2 MG/1
TABLET ORAL
Qty: 90 TABLET | Refills: 0 | Status: SHIPPED | OUTPATIENT
Start: 2024-03-11

## 2024-03-14 ASSESSMENT — ENCOUNTER SYMPTOMS
WHEEZING: 0
SORE THROAT: 0
BLOOD IN STOOL: 0
SHORTNESS OF BREATH: 0
NAUSEA: 0
DIARRHEA: 0
CHEST TIGHTNESS: 0
ABDOMINAL PAIN: 0
COUGH: 0
RHINORRHEA: 0
SINUS PRESSURE: 0
CONSTIPATION: 0
SINUS PAIN: 0
VOMITING: 0

## 2024-05-13 DIAGNOSIS — C73 THYROID CANCER (HCC): ICD-10-CM

## 2024-05-13 DIAGNOSIS — E06.3 HASHIMOTO'S THYROIDITIS: ICD-10-CM

## 2024-05-13 DIAGNOSIS — E78.2 MIXED HYPERLIPIDEMIA: ICD-10-CM

## 2024-05-13 DIAGNOSIS — E21.0 HYPERPARATHYROIDISM, PRIMARY (HCC): ICD-10-CM

## 2024-05-13 DIAGNOSIS — R73.03 PREDIABETES: ICD-10-CM

## 2024-05-13 DIAGNOSIS — E83.42 HYPOMAGNESEMIA: ICD-10-CM

## 2024-05-13 LAB
REASON FOR REJECTION: NORMAL
REJECTED TEST: NORMAL

## 2024-05-14 ENCOUNTER — TELEPHONE (OUTPATIENT)
Dept: ENDOCRINOLOGY | Age: 54
End: 2024-05-14

## 2024-05-14 LAB
25(OH)D3 SERPL-MCNC: 75.6 NG/ML
ALBUMIN SERPL-MCNC: 4.4 G/DL (ref 3.4–5)
ALBUMIN/GLOB SERPL: 1.6 {RATIO} (ref 1.1–2.2)
ALP SERPL-CCNC: 86 U/L (ref 40–129)
ALT SERPL-CCNC: 23 U/L (ref 10–40)
ANION GAP SERPL CALCULATED.3IONS-SCNC: 19 MMOL/L (ref 3–16)
ANTI-THYROGLOB ABS: 13 IU/ML
AST SERPL-CCNC: 26 U/L (ref 15–37)
BILIRUB SERPL-MCNC: <0.2 MG/DL (ref 0–1)
BUN SERPL-MCNC: 18 MG/DL (ref 7–20)
CALCIUM SERPL-MCNC: 9.1 MG/DL (ref 8.3–10.6)
CHLORIDE SERPL-SCNC: 96 MMOL/L (ref 99–110)
CO2 SERPL-SCNC: 22 MMOL/L (ref 21–32)
CREAT SERPL-MCNC: 0.7 MG/DL (ref 0.6–1.1)
GFR SERPLBLD CREATININE-BSD FMLA CKD-EPI: >90 ML/MIN/{1.73_M2}
GLUCOSE SERPL-MCNC: 125 MG/DL (ref 70–99)
MAGNESIUM SERPL-MCNC: 1.6 MG/DL (ref 1.8–2.4)
PHOSPHATE SERPL-MCNC: 3.8 MG/DL (ref 2.5–4.9)
POTASSIUM SERPL-SCNC: 3.9 MMOL/L (ref 3.5–5.1)
PROT SERPL-MCNC: 7.1 G/DL (ref 6.4–8.2)
PTH-INTACT SERPL-MCNC: 13.7 PG/ML (ref 14–72)
SODIUM SERPL-SCNC: 137 MMOL/L (ref 136–145)
T3FREE SERPL-MCNC: 2.3 PG/ML (ref 2.3–4.2)
T4 FREE SERPL-MCNC: 1.8 NG/DL (ref 0.9–1.8)
TSH SERPL DL<=0.005 MIU/L-ACNC: 2.54 UIU/ML (ref 0.27–4.2)

## 2024-05-14 NOTE — TELEPHONE ENCOUNTER
Call from [facility] lab stating the patient has a rejected lab     Rejected Lab:  Calcium - was contaminated     Kaleigh stated that they will call the pt

## 2024-05-16 ENCOUNTER — OFFICE VISIT (OUTPATIENT)
Dept: ENDOCRINOLOGY | Age: 54
End: 2024-05-16
Payer: COMMERCIAL

## 2024-05-16 VITALS
BODY MASS INDEX: 47.11 KG/M2 | HEART RATE: 87 BPM | SYSTOLIC BLOOD PRESSURE: 131 MMHG | TEMPERATURE: 98 F | DIASTOLIC BLOOD PRESSURE: 64 MMHG | HEIGHT: 62 IN | OXYGEN SATURATION: 98 % | RESPIRATION RATE: 14 BRPM | WEIGHT: 256 LBS

## 2024-05-16 DIAGNOSIS — E28.319 PREMATURE MENOPAUSE: ICD-10-CM

## 2024-05-16 DIAGNOSIS — E83.42 HYPOMAGNESEMIA: ICD-10-CM

## 2024-05-16 DIAGNOSIS — E78.2 MIXED HYPERLIPIDEMIA: ICD-10-CM

## 2024-05-16 DIAGNOSIS — M85.80 OSTEOPENIA, UNSPECIFIED LOCATION: ICD-10-CM

## 2024-05-16 DIAGNOSIS — R73.03 PREDIABETES: ICD-10-CM

## 2024-05-16 DIAGNOSIS — E66.01 CLASS 3 SEVERE OBESITY WITH SERIOUS COMORBIDITY AND BODY MASS INDEX (BMI) OF 45.0 TO 49.9 IN ADULT, UNSPECIFIED OBESITY TYPE (HCC): ICD-10-CM

## 2024-05-16 DIAGNOSIS — C73 THYROID CANCER (HCC): ICD-10-CM

## 2024-05-16 DIAGNOSIS — E89.2 POSTSURGICAL HYPOPARATHYROIDISM (HCC): ICD-10-CM

## 2024-05-16 DIAGNOSIS — E06.3 HASHIMOTO'S THYROIDITIS: ICD-10-CM

## 2024-05-16 DIAGNOSIS — E89.0 HYPOTHYROIDISM, POSTSURGICAL: ICD-10-CM

## 2024-05-16 DIAGNOSIS — E21.0 HYPERPARATHYROIDISM, PRIMARY (HCC): Primary | ICD-10-CM

## 2024-05-16 PROCEDURE — 3078F DIAST BP <80 MM HG: CPT | Performed by: INTERNAL MEDICINE

## 2024-05-16 PROCEDURE — 3075F SYST BP GE 130 - 139MM HG: CPT | Performed by: INTERNAL MEDICINE

## 2024-05-16 PROCEDURE — 99214 OFFICE O/P EST MOD 30 MIN: CPT | Performed by: INTERNAL MEDICINE

## 2024-05-16 RX ORDER — ERGOCALCIFEROL (VITAMIN D2) 50 MCG
CAPSULE ORAL
Qty: 30 CAPSULE | Refills: 0
Start: 2024-05-16

## 2024-05-16 RX ORDER — LEVOTHYROXINE SODIUM 0.03 MG/1
25 TABLET ORAL DAILY
Qty: 90 TABLET | Refills: 1 | Status: SHIPPED | OUTPATIENT
Start: 2024-05-16

## 2024-05-16 NOTE — PROGRESS NOTES
SUBJECTIVE:  Mackenzie Salmon is a 53 y.o. female who is being evaluated for hyperparathyroidism.    1. Hyperparathyroidism, primary   This started in 2020. Patient was diagnosed with hypercalcemia. The problem has been gradually worsening.  Patient started medication in 2020. Currently patient is on: N/A. Misses  N/A doses a month.    Current complaints: fatigue, hot flashes  No history of kidney stones  No persistent constipation, aches, depression  Subtotal parathyroidectomy 10/16/2020, right superior, left superior, left inferior    2.  Thyroid cancer  EXAM: NM THYROID CARCINOMA ABLATION        INDICATION: Malignant neoplasm of thyroid gland (CMS Dx) 50-year-old female with a history of    papillary thyroid cancer status post thyroidectomy here for postthyroidectomy radioablation.        RADIOPHARMACEUTICAL:  I-131 capsule 52.1 mCi PO          10/16/2020  Thyroid, total thyroidectomy:  -  Multifocal papillary thyroid carcinoma, classic type, involving the left  lobe (1.1 x 0.8 x 0.7 cm) and right lobe (3 mm).  -  Negative for extrathyroidal extension.  -  Background thyroid with chronic lymphocytic thyroiditis and nodular  thyroid hyperplasia with adenomatoid nodules.  -  Surgical resection margins are free of malignancy.    History of obstructive symptoms: difficulty swallowing No, changes in voice/hoarseness No.  History of radiation to patient's neck: No  Resent iodine exposure: No  Family history includes no thyroid abnormalities.  Family history of thyroid cancer: No    3.  Premature menopause  Menopause since age 44  Was on Estrogen    4. Obesity  He is moderately healthy, active    5.  Hashimoto's thyroiditis  Has fatigue    6.  Osteopenia  Calcium in diet  No bone pain    7.  Postsurgical hypoparathyroidism  Has numbness, tingling in hands.  Not in face anymore, had after surgery    8. Hypomagnesemia  Has fatigue    9. Prediabetes  Mother had sugar problem    10.  Postsurgical hypothyroidism  Has

## 2024-05-17 LAB — THYROGLOB SERPL-MCNC: <0.5 NG/ML (ref 1.3–31.8)

## 2024-06-09 DIAGNOSIS — E78.2 MIXED HYPERLIPIDEMIA: ICD-10-CM

## 2024-06-09 DIAGNOSIS — E28.319 PREMATURE MENOPAUSE: ICD-10-CM

## 2024-06-09 DIAGNOSIS — E83.42 HYPOMAGNESEMIA: ICD-10-CM

## 2024-06-09 DIAGNOSIS — E21.0 HYPERPARATHYROIDISM, PRIMARY (HCC): ICD-10-CM

## 2024-06-09 DIAGNOSIS — E89.0 HYPOTHYROIDISM, POSTSURGICAL: ICD-10-CM

## 2024-06-09 DIAGNOSIS — M85.80 OSTEOPENIA, UNSPECIFIED LOCATION: ICD-10-CM

## 2024-06-09 DIAGNOSIS — R73.03 PREDIABETES: ICD-10-CM

## 2024-06-09 DIAGNOSIS — E66.01 CLASS 3 SEVERE OBESITY WITH SERIOUS COMORBIDITY AND BODY MASS INDEX (BMI) OF 45.0 TO 49.9 IN ADULT, UNSPECIFIED OBESITY TYPE (HCC): ICD-10-CM

## 2024-06-09 DIAGNOSIS — E06.3 HASHIMOTO'S THYROIDITIS: ICD-10-CM

## 2024-06-09 DIAGNOSIS — E89.2 POSTSURGICAL HYPOPARATHYROIDISM (HCC): ICD-10-CM

## 2024-06-09 DIAGNOSIS — C73 THYROID CANCER (HCC): ICD-10-CM

## 2024-06-10 RX ORDER — LEVOTHYROXINE SODIUM 0.2 MG/1
TABLET ORAL
Qty: 90 TABLET | Refills: 1 | Status: SHIPPED | OUTPATIENT
Start: 2024-06-10

## 2024-08-06 DIAGNOSIS — E78.2 MIXED HYPERLIPIDEMIA: ICD-10-CM

## 2024-08-06 DIAGNOSIS — E21.0 HYPERPARATHYROIDISM, PRIMARY (HCC): ICD-10-CM

## 2024-08-06 DIAGNOSIS — R73.03 PREDIABETES: ICD-10-CM

## 2024-08-06 DIAGNOSIS — E06.3 HASHIMOTO'S THYROIDITIS: ICD-10-CM

## 2024-08-06 DIAGNOSIS — E83.42 HYPOMAGNESEMIA: ICD-10-CM

## 2024-08-06 DIAGNOSIS — C73 THYROID CANCER (HCC): ICD-10-CM

## 2024-08-06 DIAGNOSIS — E89.0 HYPOTHYROIDISM, POSTSURGICAL: ICD-10-CM

## 2024-08-06 LAB
25(OH)D3 SERPL-MCNC: 57.1 NG/ML
ANTI-THYROGLOB ABS: 16 IU/ML
CHOLEST SERPL-MCNC: 153 MG/DL (ref 0–199)
EST. AVERAGE GLUCOSE BLD GHB EST-MCNC: 131.2 MG/DL
HBA1C MFR BLD: 6.2 %
HDLC SERPL-MCNC: 33 MG/DL (ref 40–60)
LDLC SERPL CALC-MCNC: 89 MG/DL
PTH-INTACT SERPL-MCNC: 10.5 PG/ML (ref 14–72)
TRIGL SERPL-MCNC: 156 MG/DL (ref 0–150)
VLDLC SERPL CALC-MCNC: 31 MG/DL

## 2024-08-07 LAB
ALBUMIN SERPL-MCNC: 4.2 G/DL (ref 3.4–5)
ALBUMIN/GLOB SERPL: 1.5 {RATIO} (ref 1.1–2.2)
ALP SERPL-CCNC: 87 U/L (ref 40–129)
ALT SERPL-CCNC: 23 U/L (ref 10–40)
ANION GAP SERPL CALCULATED.3IONS-SCNC: 19 MMOL/L (ref 3–16)
AST SERPL-CCNC: 29 U/L (ref 15–37)
BILIRUB SERPL-MCNC: <0.2 MG/DL (ref 0–1)
BUN SERPL-MCNC: 15 MG/DL (ref 7–20)
CALCIUM SERPL-MCNC: 9.8 MG/DL (ref 8.3–10.6)
CHLORIDE SERPL-SCNC: 100 MMOL/L (ref 99–110)
CO2 SERPL-SCNC: 19 MMOL/L (ref 21–32)
CREAT SERPL-MCNC: 0.8 MG/DL (ref 0.6–1.1)
GFR SERPLBLD CREATININE-BSD FMLA CKD-EPI: 87 ML/MIN/{1.73_M2}
GLUCOSE SERPL-MCNC: 85 MG/DL (ref 70–99)
MAGNESIUM SERPL-MCNC: 1.6 MG/DL (ref 1.8–2.4)
PHOSPHATE SERPL-MCNC: 3.6 MG/DL (ref 2.5–4.9)
POTASSIUM SERPL-SCNC: 4.5 MMOL/L (ref 3.5–5.1)
PROT SERPL-MCNC: 7 G/DL (ref 6.4–8.2)
SODIUM SERPL-SCNC: 138 MMOL/L (ref 136–145)
T4 FREE SERPL-MCNC: 1.9 NG/DL (ref 0.9–1.8)
TSH SERPL DL<=0.005 MIU/L-ACNC: 0.28 UIU/ML (ref 0.27–4.2)

## 2024-08-08 LAB
CA-I ADJ PH7.4 SERPL-SCNC: 1.23 MMOL/L (ref 1.09–1.3)
CA-I SERPL ISE-SCNC: 1.21 MMOL/L (ref 1.09–1.3)

## 2024-08-11 LAB — THYROGLOB SERPL-MCNC: <0.5 NG/ML (ref 1.3–31.8)

## 2024-08-22 ENCOUNTER — OFFICE VISIT (OUTPATIENT)
Dept: ENDOCRINOLOGY | Age: 54
End: 2024-08-22
Payer: COMMERCIAL

## 2024-08-22 VITALS
RESPIRATION RATE: 14 BRPM | SYSTOLIC BLOOD PRESSURE: 134 MMHG | DIASTOLIC BLOOD PRESSURE: 85 MMHG | BODY MASS INDEX: 48.4 KG/M2 | WEIGHT: 263 LBS | HEART RATE: 86 BPM | TEMPERATURE: 98 F | HEIGHT: 62 IN | OXYGEN SATURATION: 99 %

## 2024-08-22 DIAGNOSIS — E21.0 HYPERPARATHYROIDISM, PRIMARY (HCC): Primary | ICD-10-CM

## 2024-08-22 DIAGNOSIS — C73 THYROID CANCER (HCC): ICD-10-CM

## 2024-08-22 DIAGNOSIS — M85.80 OSTEOPENIA, UNSPECIFIED LOCATION: ICD-10-CM

## 2024-08-22 DIAGNOSIS — E89.0 HYPOTHYROIDISM, POSTSURGICAL: ICD-10-CM

## 2024-08-22 DIAGNOSIS — E06.3 HASHIMOTO'S THYROIDITIS: ICD-10-CM

## 2024-08-22 DIAGNOSIS — E78.2 MIXED HYPERLIPIDEMIA: ICD-10-CM

## 2024-08-22 DIAGNOSIS — R73.03 PREDIABETES: ICD-10-CM

## 2024-08-22 DIAGNOSIS — E66.01 CLASS 3 SEVERE OBESITY WITH SERIOUS COMORBIDITY AND BODY MASS INDEX (BMI) OF 45.0 TO 49.9 IN ADULT, UNSPECIFIED OBESITY TYPE (HCC): ICD-10-CM

## 2024-08-22 DIAGNOSIS — E28.319 PREMATURE MENOPAUSE: ICD-10-CM

## 2024-08-22 DIAGNOSIS — E83.42 HYPOMAGNESEMIA: ICD-10-CM

## 2024-08-22 DIAGNOSIS — E89.2 POSTSURGICAL HYPOPARATHYROIDISM (HCC): ICD-10-CM

## 2024-08-22 PROCEDURE — 3079F DIAST BP 80-89 MM HG: CPT | Performed by: INTERNAL MEDICINE

## 2024-08-22 PROCEDURE — 3075F SYST BP GE 130 - 139MM HG: CPT | Performed by: INTERNAL MEDICINE

## 2024-08-22 PROCEDURE — 99214 OFFICE O/P EST MOD 30 MIN: CPT | Performed by: INTERNAL MEDICINE

## 2024-08-22 RX ORDER — LEVOTHYROXINE SODIUM 0.03 MG/1
25 TABLET ORAL DAILY
Qty: 90 TABLET | Refills: 1 | Status: SHIPPED | OUTPATIENT
Start: 2024-08-22

## 2024-08-22 RX ORDER — LEVOTHYROXINE SODIUM 0.2 MG/1
TABLET ORAL
Qty: 90 TABLET | Refills: 1 | Status: SHIPPED | OUTPATIENT
Start: 2024-08-22

## 2024-08-22 NOTE — PROGRESS NOTES
Characteristics      Tumor Site: Left lobe      Histologic Type: Papillary carcinoma, classic (usual,                       conventional)      Tumor Size: 1.1 x 0.8 x 0.7 Centimeters (cm)      Extrathyroidal Extension: Not identified      Angioinvasion (vascular invasion): Not identified      Lymphatic Invasion: Not identified      Margins: Uninvolved by carcinoma  Lymph Nodes    Regional Lymph Nodes: No lymph nodes submitted or found  Pathologic Stage Classification (pTNM, AJCC 8th Edition)    TNM Descriptors: m (multiple primary tumors)    Primary Tumor (pT): pT1b    Regional Lymph Nodes (pN): pNX  Comments      Second small focus of papillary thyroid carcinoma is present in the  right lobe measuring 3 mm.    - US Head Neck Soft Tissue Thyroid; Future  -Thyroglobulin  -Thyroglobulin antibody  EXAM:  NM THYROID METASTASES LIMITED   EXAM: NM THYROID METAS UPTAKE ADDL       INDICATION: Malignant neoplasm of thyroid gland (CMS Dx); This patient is referred for the    evaluation of residual thyroid tissue post thyroidectomy.       RADIOPHARMACEUTICAL:  Recombinant TSH 0.9 mg IM x 2, I-123 capsule 335 uCi PO       TECHNICAL:  A limited thyroid cancer workup was performed using the recombinant TSH protocol.     Recombinant TSH was administered prior to the administration of iodine-123.  Imaging of the    neck and chest was performed 24 hours following radioiodine-123 administration. A quantitative    uptake was also performed at the level of the neck.            COMPARISON:  None           FINDINGS:        Images demonstrate a focal area of uptake in the anterior neck.        The 24 hour radioiodine uptake at the level of the neck is 0.8%.         4/15/2022  Tg <0.1, antibodies negative  Whole body images demonstrate the expected normal concentration of the radioiodine-131 in the head, abdomen, pelvis and extremities.  No abnormal areas of increased radioiodine are seen. Previously demonstrated focal radioiodine uptake

## 2024-09-19 ENCOUNTER — OFFICE VISIT (OUTPATIENT)
Age: 54
End: 2024-09-19
Payer: COMMERCIAL

## 2024-09-19 VITALS
OXYGEN SATURATION: 97 % | SYSTOLIC BLOOD PRESSURE: 128 MMHG | HEIGHT: 62 IN | DIASTOLIC BLOOD PRESSURE: 80 MMHG | WEIGHT: 258.6 LBS | BODY MASS INDEX: 47.59 KG/M2 | HEART RATE: 84 BPM

## 2024-09-19 DIAGNOSIS — I10 PRIMARY HYPERTENSION: ICD-10-CM

## 2024-09-19 DIAGNOSIS — G47.33 OSA (OBSTRUCTIVE SLEEP APNEA): Primary | ICD-10-CM

## 2024-09-19 DIAGNOSIS — E66.01 CLASS 3 SEVERE OBESITY WITH SERIOUS COMORBIDITY AND BODY MASS INDEX (BMI) OF 45.0 TO 49.9 IN ADULT, UNSPECIFIED OBESITY TYPE (HCC): ICD-10-CM

## 2024-09-19 PROCEDURE — G2211 COMPLEX E/M VISIT ADD ON: HCPCS | Performed by: NURSE PRACTITIONER

## 2024-09-19 PROCEDURE — 3074F SYST BP LT 130 MM HG: CPT | Performed by: NURSE PRACTITIONER

## 2024-09-19 PROCEDURE — 99214 OFFICE O/P EST MOD 30 MIN: CPT | Performed by: NURSE PRACTITIONER

## 2024-09-19 PROCEDURE — 3079F DIAST BP 80-89 MM HG: CPT | Performed by: NURSE PRACTITIONER

## 2024-09-19 ASSESSMENT — SLEEP AND FATIGUE QUESTIONNAIRES
HOW LIKELY ARE YOU TO NOD OFF OR FALL ASLEEP WHILE SITTING AND READING: MODERATE CHANCE OF DOZING
HOW LIKELY ARE YOU TO NOD OFF OR FALL ASLEEP WHILE SITTING QUIETLY AFTER LUNCH WITHOUT ALCOHOL: WOULD NEVER DOZE
HOW LIKELY ARE YOU TO NOD OFF OR FALL ASLEEP IN A CAR, WHILE STOPPED FOR A FEW MINUTES IN TRAFFIC: WOULD NEVER DOZE
ESS TOTAL SCORE: 9
HOW LIKELY ARE YOU TO NOD OFF OR FALL ASLEEP WHILE SITTING INACTIVE IN A PUBLIC PLACE: SLIGHT CHANCE OF DOZING
HOW LIKELY ARE YOU TO NOD OFF OR FALL ASLEEP WHEN YOU ARE A PASSENGER IN A CAR FOR AN HOUR WITHOUT A BREAK: HIGH CHANCE OF DOZING
HOW LIKELY ARE YOU TO NOD OFF OR FALL ASLEEP WHILE SITTING AND TALKING TO SOMEONE: WOULD NEVER DOZE
HOW LIKELY ARE YOU TO NOD OFF OR FALL ASLEEP WHILE WATCHING TV: MODERATE CHANCE OF DOZING
HOW LIKELY ARE YOU TO NOD OFF OR FALL ASLEEP WHILE LYING DOWN TO REST IN THE AFTERNOON WHEN CIRCUMSTANCES PERMIT: SLIGHT CHANCE OF DOZING

## 2024-09-20 DIAGNOSIS — I10 ESSENTIAL HYPERTENSION: ICD-10-CM

## 2024-09-20 RX ORDER — LISINOPRIL/HYDROCHLOROTHIAZIDE 10-12.5 MG
1 TABLET ORAL DAILY
Qty: 90 TABLET | Refills: 0 | Status: SHIPPED | OUTPATIENT
Start: 2024-09-20

## 2024-09-24 ENCOUNTER — OFFICE VISIT (OUTPATIENT)
Dept: SURGERY | Age: 54
End: 2024-09-24
Payer: COMMERCIAL

## 2024-09-24 VITALS
OXYGEN SATURATION: 100 % | DIASTOLIC BLOOD PRESSURE: 68 MMHG | TEMPERATURE: 97.2 F | HEART RATE: 82 BPM | SYSTOLIC BLOOD PRESSURE: 122 MMHG | BODY MASS INDEX: 47.81 KG/M2 | WEIGHT: 261.4 LBS

## 2024-09-24 DIAGNOSIS — L29.0 ANAL PRURITUS: Primary | ICD-10-CM

## 2024-09-24 PROCEDURE — 99203 OFFICE O/P NEW LOW 30 MIN: CPT | Performed by: SURGERY

## 2024-09-24 PROCEDURE — 3078F DIAST BP <80 MM HG: CPT | Performed by: SURGERY

## 2024-09-24 PROCEDURE — 3074F SYST BP LT 130 MM HG: CPT | Performed by: SURGERY

## 2024-10-11 DIAGNOSIS — F41.9 ANXIETY: ICD-10-CM

## 2024-10-11 NOTE — TELEPHONE ENCOUNTER
Medication:   Requested Prescriptions     Pending Prescriptions Disp Refills    sertraline (ZOLOFT) 50 MG tablet [Pharmacy Med Name: SERTRALINE HCL 50 MG TABLET] 90 tablet 1     Sig: TAKE 1 TABLET BY MOUTH DAILY     Last Filled:  03/16/2024    Last appt: 3/5/2024   Next appt: 10/31/2024    Last OARRS:        No data to display

## 2024-10-14 DIAGNOSIS — E78.2 MIXED HYPERLIPIDEMIA: ICD-10-CM

## 2024-10-14 RX ORDER — FENOFIBRATE 145 MG/1
145 TABLET, COATED ORAL DAILY
Qty: 30 TABLET | Refills: 0 | Status: SHIPPED | OUTPATIENT
Start: 2024-10-14

## 2024-10-14 NOTE — TELEPHONE ENCOUNTER
Medication:   Requested Prescriptions     Pending Prescriptions Disp Refills    fenofibrate (TRICOR) 145 MG tablet [Pharmacy Med Name: FENOFIBRATE 145 MG TABLET] 90 tablet 1     Sig: TAKE 1 TABLET BY MOUTH DAILY     Last Filled:  03/05/2024    Last appt: 3/5/2024   Next appt: 10/31/2024    Last OARRS:        No data to display

## 2024-10-31 ENCOUNTER — OFFICE VISIT (OUTPATIENT)
Dept: PRIMARY CARE CLINIC | Age: 54
End: 2024-10-31
Payer: COMMERCIAL

## 2024-10-31 VITALS
OXYGEN SATURATION: 97 % | SYSTOLIC BLOOD PRESSURE: 120 MMHG | TEMPERATURE: 97.9 F | DIASTOLIC BLOOD PRESSURE: 68 MMHG | BODY MASS INDEX: 47.29 KG/M2 | HEART RATE: 87 BPM | WEIGHT: 257 LBS | HEIGHT: 62 IN

## 2024-10-31 DIAGNOSIS — E78.2 MIXED HYPERLIPIDEMIA: ICD-10-CM

## 2024-10-31 DIAGNOSIS — Z23 NEED FOR HEPATITIS B VACCINATION: ICD-10-CM

## 2024-10-31 DIAGNOSIS — Z12.31 ENCOUNTER FOR SCREENING MAMMOGRAM FOR BREAST CANCER: ICD-10-CM

## 2024-10-31 DIAGNOSIS — I10 ESSENTIAL HYPERTENSION: ICD-10-CM

## 2024-10-31 DIAGNOSIS — R73.03 PREDIABETES: ICD-10-CM

## 2024-10-31 DIAGNOSIS — Z12.11 COLON CANCER SCREENING: ICD-10-CM

## 2024-10-31 DIAGNOSIS — F41.9 ANXIETY: ICD-10-CM

## 2024-10-31 DIAGNOSIS — Z00.00 ENCOUNTER FOR WELL ADULT EXAM WITHOUT ABNORMAL FINDINGS: Primary | ICD-10-CM

## 2024-10-31 DIAGNOSIS — J30.9 ALLERGIC RHINITIS, UNSPECIFIED SEASONALITY, UNSPECIFIED TRIGGER: ICD-10-CM

## 2024-10-31 PROCEDURE — 99396 PREV VISIT EST AGE 40-64: CPT | Performed by: INTERNAL MEDICINE

## 2024-10-31 PROCEDURE — 3074F SYST BP LT 130 MM HG: CPT | Performed by: INTERNAL MEDICINE

## 2024-10-31 PROCEDURE — 3078F DIAST BP <80 MM HG: CPT | Performed by: INTERNAL MEDICINE

## 2024-10-31 RX ORDER — FENOFIBRATE 145 MG/1
145 TABLET, COATED ORAL DAILY
Qty: 90 TABLET | Refills: 1 | Status: SHIPPED | OUTPATIENT
Start: 2024-10-31

## 2024-10-31 RX ORDER — LISINOPRIL AND HYDROCHLOROTHIAZIDE 10; 12.5 MG/1; MG/1
1 TABLET ORAL DAILY
Qty: 90 TABLET | Refills: 1 | Status: SHIPPED | OUTPATIENT
Start: 2024-10-31

## 2024-10-31 SDOH — ECONOMIC STABILITY: FOOD INSECURITY: WITHIN THE PAST 12 MONTHS, YOU WORRIED THAT YOUR FOOD WOULD RUN OUT BEFORE YOU GOT MONEY TO BUY MORE.: NEVER TRUE

## 2024-10-31 SDOH — ECONOMIC STABILITY: INCOME INSECURITY: HOW HARD IS IT FOR YOU TO PAY FOR THE VERY BASICS LIKE FOOD, HOUSING, MEDICAL CARE, AND HEATING?: NOT HARD AT ALL

## 2024-10-31 SDOH — ECONOMIC STABILITY: FOOD INSECURITY: WITHIN THE PAST 12 MONTHS, THE FOOD YOU BOUGHT JUST DIDN'T LAST AND YOU DIDN'T HAVE MONEY TO GET MORE.: NEVER TRUE

## 2024-10-31 NOTE — PROGRESS NOTES
diet  -Regular aerobic exercise   Orders:  -     fenofibrate (TRICOR) 145 MG tablet; Take 1 tablet by mouth daily, Disp-90 tablet, R-1Normal  4. Prediabetes  Assessment & Plan:  -Stable  -Low carbohydrate diet  -Regular aerobic exercise  Orders:  -     Hemoglobin A1C; Future  5. Anxiety  Assessment & Plan:  -stable  -Continue sertraline 50 mg once daily   Orders:  -     sertraline (ZOLOFT) 50 MG tablet; Take 1 tablet by mouth daily, Disp-90 tablet, R-1Normal  6. Allergic rhinitis, unspecified seasonality, unspecified trigger  Assessment & Plan:  -Stable  -Continue Flonase nasal spray 2 sprays each nostril once daily  -Continue loratadine 10 mg once daily as needed   7. Colon cancer screening  Assessment & Plan:  -Referral to GI for screening colonoscopy  Orders:  -     TELLO - Kendrick Houston MD, Gastroenterology, [place]  8. Encounter for screening mammogram for breast cancer  Assessment & Plan:  -Mammogram ordered   Orders:  -     FRITZ DIGITAL SCREEN W OR WO CAD BILATERAL; Future  9. Need for hepatitis B vaccination  Assessment & Plan:  -Heplisav-B given   Orders:  -     Hep B, HEPLISAV-B, (age 18 yrs+), IM, 0.5mL, 2-Dose; Future          Return in about 6 months (around 4/30/2025) for hypertension, prediabetes, anxiety, GERD, and allergic rhinitis.     Personalized Preventive Plan  Current Health Maintenance Status  Immunization History   Administered Date(s) Administered    COVID-19, PFIZER PURPLE top, DILUTE for use, (age 12 y+), 30mcg/0.3mL 02/19/2021, 03/11/2021, 12/16/2021    Hepatitis B (Recombivax HB) 07/01/2015, 08/12/2015    INFLUENZA, INTRADERMAL, QUADRIVALENT, PRESERVATIVE FREE 10/27/2016, 11/09/2017    Influenza, FLUAD, (age 65 y+), IM, Trivalent PF, 0.5mL 03/20/2020    Influenza, FLUARIX, FLULAVAL, FLUZONE (age 6 mo+) and AFLURIA, (age 3 y+), Quadv PF, 0.5mL 11/12/2018    Influenza, FLUCELVAX, (age 6 mo+), MDCK, Quadv PF, 0.5mL 09/20/2022, 03/05/2024    TDaP, ADACEL (age 10y-64y), BOOSTRIX (age

## 2024-11-10 DIAGNOSIS — F41.9 ANXIETY: ICD-10-CM

## 2024-11-10 PROBLEM — Z12.31 ENCOUNTER FOR SCREENING MAMMOGRAM FOR BREAST CANCER: Status: ACTIVE | Noted: 2024-11-10

## 2024-11-10 PROBLEM — Z12.11 COLON CANCER SCREENING: Status: ACTIVE | Noted: 2024-11-10

## 2024-11-10 PROBLEM — Z23 NEED FOR HEPATITIS B VACCINATION: Status: ACTIVE | Noted: 2024-11-10

## 2024-11-10 PROBLEM — Z00.00 ENCOUNTER FOR WELL ADULT EXAM WITHOUT ABNORMAL FINDINGS: Status: ACTIVE | Noted: 2024-11-10

## 2024-11-12 DIAGNOSIS — M85.80 OSTEOPENIA, UNSPECIFIED LOCATION: ICD-10-CM

## 2024-11-12 DIAGNOSIS — E21.0 HYPERPARATHYROIDISM, PRIMARY (HCC): ICD-10-CM

## 2024-11-12 DIAGNOSIS — E89.0 HYPOTHYROIDISM, POSTSURGICAL: ICD-10-CM

## 2024-11-12 DIAGNOSIS — E78.2 MIXED HYPERLIPIDEMIA: ICD-10-CM

## 2024-11-12 DIAGNOSIS — E06.3 HASHIMOTO'S THYROIDITIS: ICD-10-CM

## 2024-11-12 DIAGNOSIS — C73 THYROID CANCER (HCC): ICD-10-CM

## 2024-11-12 DIAGNOSIS — E28.319 PREMATURE MENOPAUSE: ICD-10-CM

## 2024-11-12 DIAGNOSIS — R73.03 PREDIABETES: ICD-10-CM

## 2024-11-12 DIAGNOSIS — E89.2 POSTSURGICAL HYPOPARATHYROIDISM (HCC): ICD-10-CM

## 2024-11-12 DIAGNOSIS — E83.42 HYPOMAGNESEMIA: ICD-10-CM

## 2024-11-12 RX ORDER — LEVOTHYROXINE SODIUM 25 UG/1
25 TABLET ORAL DAILY
Qty: 90 TABLET | Refills: 0 | Status: SHIPPED | OUTPATIENT
Start: 2024-11-12

## 2024-11-18 ENCOUNTER — NURSE ONLY (OUTPATIENT)
Dept: PRIMARY CARE CLINIC | Age: 54
End: 2024-11-18
Payer: COMMERCIAL

## 2024-11-18 DIAGNOSIS — Z23 NEED FOR HEPATITIS B VACCINATION: Primary | ICD-10-CM

## 2024-11-18 PROCEDURE — 90746 HEPB VACCINE 3 DOSE ADULT IM: CPT | Performed by: INTERNAL MEDICINE

## 2024-11-18 PROCEDURE — 90471 IMMUNIZATION ADMIN: CPT | Performed by: INTERNAL MEDICINE

## 2024-12-02 DIAGNOSIS — R73.03 PREDIABETES: ICD-10-CM

## 2024-12-02 DIAGNOSIS — E28.319 PREMATURE MENOPAUSE: ICD-10-CM

## 2024-12-02 DIAGNOSIS — E06.3 HASHIMOTO'S THYROIDITIS: ICD-10-CM

## 2024-12-02 DIAGNOSIS — E89.0 HYPOTHYROIDISM, POSTSURGICAL: ICD-10-CM

## 2024-12-02 DIAGNOSIS — E21.0 HYPERPARATHYROIDISM, PRIMARY (HCC): ICD-10-CM

## 2024-12-02 DIAGNOSIS — M85.80 OSTEOPENIA, UNSPECIFIED LOCATION: ICD-10-CM

## 2024-12-02 DIAGNOSIS — Z00.00 ENCOUNTER FOR WELL ADULT EXAM WITHOUT ABNORMAL FINDINGS: ICD-10-CM

## 2024-12-02 DIAGNOSIS — E83.42 HYPOMAGNESEMIA: ICD-10-CM

## 2024-12-02 DIAGNOSIS — C73 THYROID CANCER (HCC): ICD-10-CM

## 2024-12-02 DIAGNOSIS — E89.2 POSTSURGICAL HYPOPARATHYROIDISM (HCC): ICD-10-CM

## 2024-12-02 DIAGNOSIS — E78.2 MIXED HYPERLIPIDEMIA: ICD-10-CM

## 2024-12-03 ENCOUNTER — OFFICE VISIT (OUTPATIENT)
Dept: ENDOCRINOLOGY | Age: 54
End: 2024-12-03
Payer: COMMERCIAL

## 2024-12-03 VITALS
TEMPERATURE: 98 F | RESPIRATION RATE: 14 BRPM | SYSTOLIC BLOOD PRESSURE: 150 MMHG | OXYGEN SATURATION: 99 % | BODY MASS INDEX: 47.48 KG/M2 | DIASTOLIC BLOOD PRESSURE: 77 MMHG | HEIGHT: 62 IN | HEART RATE: 79 BPM | WEIGHT: 258 LBS

## 2024-12-03 DIAGNOSIS — E21.0 HYPERPARATHYROIDISM, PRIMARY (HCC): Primary | ICD-10-CM

## 2024-12-03 DIAGNOSIS — E83.42 HYPOMAGNESEMIA: ICD-10-CM

## 2024-12-03 DIAGNOSIS — E06.3 HASHIMOTO'S THYROIDITIS: ICD-10-CM

## 2024-12-03 DIAGNOSIS — E89.2 POSTSURGICAL HYPOPARATHYROIDISM (HCC): ICD-10-CM

## 2024-12-03 DIAGNOSIS — E28.319 PREMATURE MENOPAUSE: ICD-10-CM

## 2024-12-03 DIAGNOSIS — E66.01 CLASS 3 SEVERE OBESITY WITH SERIOUS COMORBIDITY AND BODY MASS INDEX (BMI) OF 45.0 TO 49.9 IN ADULT, UNSPECIFIED OBESITY TYPE: ICD-10-CM

## 2024-12-03 DIAGNOSIS — E89.0 HYPOTHYROIDISM, POSTSURGICAL: ICD-10-CM

## 2024-12-03 DIAGNOSIS — M85.80 OSTEOPENIA, UNSPECIFIED LOCATION: ICD-10-CM

## 2024-12-03 DIAGNOSIS — E78.2 MIXED HYPERLIPIDEMIA: ICD-10-CM

## 2024-12-03 DIAGNOSIS — C73 THYROID CANCER (HCC): ICD-10-CM

## 2024-12-03 DIAGNOSIS — E66.813 CLASS 3 SEVERE OBESITY WITH SERIOUS COMORBIDITY AND BODY MASS INDEX (BMI) OF 45.0 TO 49.9 IN ADULT, UNSPECIFIED OBESITY TYPE: ICD-10-CM

## 2024-12-03 DIAGNOSIS — R73.03 PREDIABETES: ICD-10-CM

## 2024-12-03 LAB
25(OH)D3 SERPL-MCNC: 53.4 NG/ML
ALBUMIN SERPL-MCNC: 4.4 G/DL (ref 3.4–5)
ALBUMIN/GLOB SERPL: 1.8 {RATIO} (ref 1.1–2.2)
ALP SERPL-CCNC: 74 U/L (ref 40–129)
ALT SERPL-CCNC: 32 U/L (ref 10–40)
ANION GAP SERPL CALCULATED.3IONS-SCNC: 14 MMOL/L (ref 3–16)
ANTI-THYROGLOB ABS: <15 IU/ML
AST SERPL-CCNC: 35 U/L (ref 15–37)
BASOPHILS # BLD: 0 K/UL (ref 0–0.2)
BASOPHILS NFR BLD: 0.6 %
BILIRUB SERPL-MCNC: <0.2 MG/DL (ref 0–1)
BUN SERPL-MCNC: 13 MG/DL (ref 7–20)
CA-I ADJ PH7.4 SERPL-SCNC: 1.17 MMOL/L (ref 1.09–1.3)
CA-I SERPL ISE-SCNC: 1.15 MMOL/L (ref 1.09–1.3)
CALCIUM SERPL-MCNC: 9.1 MG/DL (ref 8.3–10.6)
CHLORIDE SERPL-SCNC: 98 MMOL/L (ref 99–110)
CHOLEST SERPL-MCNC: 154 MG/DL (ref 0–199)
CO2 SERPL-SCNC: 26 MMOL/L (ref 21–32)
CREAT SERPL-MCNC: 0.7 MG/DL (ref 0.6–1.1)
DEPRECATED RDW RBC AUTO: 14.6 % (ref 12.4–15.4)
EOSINOPHIL # BLD: 0.2 K/UL (ref 0–0.6)
EOSINOPHIL NFR BLD: 2.3 %
EST. AVERAGE GLUCOSE BLD GHB EST-MCNC: 134.1 MG/DL
GFR SERPLBLD CREATININE-BSD FMLA CKD-EPI: >90 ML/MIN/{1.73_M2}
GLUCOSE SERPL-MCNC: 80 MG/DL (ref 70–99)
HBA1C MFR BLD: 6.3 %
HCT VFR BLD AUTO: 35.2 % (ref 36–48)
HDLC SERPL-MCNC: 30 MG/DL (ref 40–60)
HGB BLD-MCNC: 11.5 G/DL (ref 12–16)
LDLC SERPL CALC-MCNC: 95 MG/DL
LYMPHOCYTES # BLD: 2.1 K/UL (ref 1–5.1)
LYMPHOCYTES NFR BLD: 26.5 %
MAGNESIUM SERPL-MCNC: 1.45 MG/DL (ref 1.8–2.4)
MCH RBC QN AUTO: 25.1 PG (ref 26–34)
MCHC RBC AUTO-ENTMCNC: 32.7 G/DL (ref 31–36)
MCV RBC AUTO: 76.8 FL (ref 80–100)
MONOCYTES # BLD: 0.5 K/UL (ref 0–1.3)
MONOCYTES NFR BLD: 6.2 %
NEUTROPHILS # BLD: 5 K/UL (ref 1.7–7.7)
NEUTROPHILS NFR BLD: 64.4 %
PHOSPHATE SERPL-MCNC: 3.3 MG/DL (ref 2.5–4.9)
PLATELET # BLD AUTO: 379 K/UL (ref 135–450)
PMV BLD AUTO: 7.9 FL (ref 5–10.5)
POTASSIUM SERPL-SCNC: 3.8 MMOL/L (ref 3.5–5.1)
PROT SERPL-MCNC: 6.9 G/DL (ref 6.4–8.2)
PTH-INTACT SERPL-MCNC: 12 PG/ML (ref 14–72)
RBC # BLD AUTO: 4.58 M/UL (ref 4–5.2)
SODIUM SERPL-SCNC: 138 MMOL/L (ref 136–145)
T4 FREE SERPL-MCNC: 2 NG/DL (ref 0.9–1.8)
TRIGL SERPL-MCNC: 144 MG/DL (ref 0–150)
TSH SERPL DL<=0.005 MIU/L-ACNC: 0.19 UIU/ML (ref 0.27–4.2)
VLDLC SERPL CALC-MCNC: 29 MG/DL
WBC # BLD AUTO: 7.8 K/UL (ref 4–11)

## 2024-12-03 PROCEDURE — 3078F DIAST BP <80 MM HG: CPT | Performed by: INTERNAL MEDICINE

## 2024-12-03 PROCEDURE — 99214 OFFICE O/P EST MOD 30 MIN: CPT | Performed by: INTERNAL MEDICINE

## 2024-12-03 PROCEDURE — 3077F SYST BP >= 140 MM HG: CPT | Performed by: INTERNAL MEDICINE

## 2024-12-03 RX ORDER — LEVOTHYROXINE SODIUM 200 UG/1
TABLET ORAL
Qty: 90 TABLET | Refills: 1 | Status: SHIPPED | OUTPATIENT
Start: 2024-12-03

## 2024-12-03 RX ORDER — LEVOTHYROXINE SODIUM 25 UG/1
25 TABLET ORAL DAILY
Qty: 90 TABLET | Refills: 1 | Status: SHIPPED | OUTPATIENT
Start: 2024-12-03

## 2024-12-03 NOTE — PROGRESS NOTES
SUBJECTIVE:  Mackenzie Salmon is a 54 y.o. female who is being evaluated for hyperparathyroidism.    1. Hyperparathyroidism, primary   This started in 2020. Patient was diagnosed with hypercalcemia. The problem has been gradually worsening.  Patient started medication in 2020. Currently patient is on: N/A. Misses  N/A doses a month.    Current complaints: fatigue.  Hot flashes better in cold weather.  No history of kidney stones  No persistent constipation, aches, depression  Subtotal parathyroidectomy 10/16/2020, right superior, left superior, left inferior    2.  Thyroid cancer  EXAM: NM THYROID CARCINOMA ABLATION        INDICATION: Malignant neoplasm of thyroid gland (CMS Dx) 50-year-old female with a history of    papillary thyroid cancer status post thyroidectomy here for postthyroidectomy radioablation.        RADIOPHARMACEUTICAL:  I-131 capsule 52.1 mCi PO          10/16/2020  Thyroid, total thyroidectomy:  -  Multifocal papillary thyroid carcinoma, classic type, involving the left  lobe (1.1 x 0.8 x 0.7 cm) and right lobe (3 mm).  -  Negative for extrathyroidal extension.  -  Background thyroid with chronic lymphocytic thyroiditis and nodular  thyroid hyperplasia with adenomatoid nodules.  -  Surgical resection margins are free of malignancy.    History of obstructive symptoms: difficulty swallowing No, changes in voice/hoarseness No.  History of radiation to patient's neck: No  Resent iodine exposure: No  Family history includes no thyroid abnormalities.  Family history of thyroid cancer: No    3.  Premature menopause  Menopause since age 44  Was on Estrogen    4. Obesity  He is moderately healthy, active    5.  Hashimoto's thyroiditis  Has fatigue    6.  Osteopenia  Calcium in diet  No bone pain    7.  Postsurgical hypoparathyroidism  Has numbness, tingling in hands.  Not in face anymore, had after surgery    8. Hypomagnesemia  Has fatigue    9. Prediabetes  Mother had sugar problem    10.  Postsurgical

## 2024-12-10 PROBLEM — Z12.31 ENCOUNTER FOR SCREENING MAMMOGRAM FOR BREAST CANCER: Status: RESOLVED | Noted: 2024-11-10 | Resolved: 2024-12-10

## 2024-12-10 PROBLEM — Z00.00 ENCOUNTER FOR WELL ADULT EXAM WITHOUT ABNORMAL FINDINGS: Status: RESOLVED | Noted: 2024-11-10 | Resolved: 2024-12-10

## 2024-12-10 PROBLEM — Z12.11 COLON CANCER SCREENING: Status: RESOLVED | Noted: 2024-11-10 | Resolved: 2024-12-10

## 2025-01-19 LAB — THYROGLOB SERPL-MCNC: <0.5 NG/ML (ref 1.3–31.8)

## 2025-02-11 NOTE — PROGRESS NOTES
ENCOUNTER DATE: 2/12/2025     NAME: Mackenzie Salmon   AGE: 54 y.o.   GENDER: female   YOB: 1970    Chief Complaint   Patient presents with    Skin Problem     Started 2/10/25 pt had discharge coming from belly button        ASSESSMENT/PLAN:  1. Local skin infection  Previous notes and CT reviewed  Not enough drainage for wound culture  Start on oral antibiotics  Advised to stop using peroxide.  May use antibacterial soap and water to clean area.  Try to keep area dry as possible while at work  Possible fungal origin given her work environment/sweating and appearance around the umbilicus.  Trial of topical antifungal/combo cream  Follow-up if no improvement  - cephALEXin (KEFLEX) 500 MG capsule; Take 1 capsule by mouth 3 times daily for 10 days  Dispense: 30 capsule; Refill: 0  - clotrimazole-betamethasone (LOTRISONE) 1-0.05 % cream; Apply topically 2 times daily.  Dispense: 15 g; Refill: 0      Return if symptoms worsen or fail to improve.     HPI:   Patient is here for problem visit    Complains of infection in her umbilicus  Noticed redness and drainage from umbilicus few days ago.   Itches. Draining white/clear fluid.   Is a little tender.   No fevers. Occasional nausea.     Tried peroxide and antibiotic cream. A little better today.     Had similar infection 2023.  Was treated with Keflex.    CT of abd completed at the time, which was negative.   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   Respiratory:  Negative for cough, shortness of breath and wheezing.    Cardiovascular:  Negative for chest pain.   Gastrointestinal:  Negative for abdominal distention, abdominal pain, blood in stool, constipation, diarrhea, nausea, rectal pain and vomiting.   Musculoskeletal:  Negative for myalgias.   Skin:  Positive for wound. Negative for rash.   Neurological:  Negative for dizziness,

## 2025-02-12 ENCOUNTER — OFFICE VISIT (OUTPATIENT)
Dept: PRIMARY CARE CLINIC | Age: 55
End: 2025-02-12

## 2025-02-12 VITALS
DIASTOLIC BLOOD PRESSURE: 68 MMHG | SYSTOLIC BLOOD PRESSURE: 126 MMHG | WEIGHT: 256 LBS | RESPIRATION RATE: 16 BRPM | BODY MASS INDEX: 46.81 KG/M2 | TEMPERATURE: 97.9 F | OXYGEN SATURATION: 99 % | HEART RATE: 76 BPM

## 2025-02-12 DIAGNOSIS — L08.9 LOCAL SKIN INFECTION: Primary | ICD-10-CM

## 2025-02-12 RX ORDER — CLOTRIMAZOLE AND BETAMETHASONE DIPROPIONATE 10; .64 MG/G; MG/G
CREAM TOPICAL
Qty: 15 G | Refills: 0 | Status: SHIPPED | OUTPATIENT
Start: 2025-02-12

## 2025-02-12 RX ORDER — CEPHALEXIN 500 MG/1
500 CAPSULE ORAL 3 TIMES DAILY
Qty: 30 CAPSULE | Refills: 0 | Status: SHIPPED | OUTPATIENT
Start: 2025-02-12 | End: 2025-02-22

## 2025-02-12 SDOH — ECONOMIC STABILITY: FOOD INSECURITY: WITHIN THE PAST 12 MONTHS, YOU WORRIED THAT YOUR FOOD WOULD RUN OUT BEFORE YOU GOT MONEY TO BUY MORE.: NEVER TRUE

## 2025-02-12 SDOH — ECONOMIC STABILITY: FOOD INSECURITY: WITHIN THE PAST 12 MONTHS, THE FOOD YOU BOUGHT JUST DIDN'T LAST AND YOU DIDN'T HAVE MONEY TO GET MORE.: NEVER TRUE

## 2025-02-12 ASSESSMENT — PATIENT HEALTH QUESTIONNAIRE - PHQ9
2. FEELING DOWN, DEPRESSED OR HOPELESS: NOT AT ALL
SUM OF ALL RESPONSES TO PHQ QUESTIONS 1-9: 0
SUM OF ALL RESPONSES TO PHQ QUESTIONS 1-9: 0
SUM OF ALL RESPONSES TO PHQ9 QUESTIONS 1 & 2: 0
1. LITTLE INTEREST OR PLEASURE IN DOING THINGS: NOT AT ALL
SUM OF ALL RESPONSES TO PHQ QUESTIONS 1-9: 0
SUM OF ALL RESPONSES TO PHQ QUESTIONS 1-9: 0

## 2025-02-12 ASSESSMENT — ENCOUNTER SYMPTOMS
SHORTNESS OF BREATH: 0
WHEEZING: 0
RECTAL PAIN: 0
ABDOMINAL DISTENTION: 0
DIARRHEA: 0
COUGH: 0
VOMITING: 0
ABDOMINAL PAIN: 0
CONSTIPATION: 0
NAUSEA: 0
BLOOD IN STOOL: 0

## 2025-03-07 DIAGNOSIS — C73 THYROID CANCER (HCC): ICD-10-CM

## 2025-03-07 DIAGNOSIS — E06.3 HASHIMOTO'S THYROIDITIS: ICD-10-CM

## 2025-03-07 DIAGNOSIS — E78.2 MIXED HYPERLIPIDEMIA: ICD-10-CM

## 2025-03-07 DIAGNOSIS — E89.2 POSTSURGICAL HYPOPARATHYROIDISM: ICD-10-CM

## 2025-03-07 DIAGNOSIS — E89.0 HYPOTHYROIDISM, POSTSURGICAL: ICD-10-CM

## 2025-03-07 DIAGNOSIS — E83.42 HYPOMAGNESEMIA: ICD-10-CM

## 2025-03-07 DIAGNOSIS — M85.80 OSTEOPENIA, UNSPECIFIED LOCATION: ICD-10-CM

## 2025-03-07 DIAGNOSIS — E21.0 HYPERPARATHYROIDISM, PRIMARY: ICD-10-CM

## 2025-03-07 DIAGNOSIS — R73.03 PREDIABETES: ICD-10-CM

## 2025-03-07 LAB
25(OH)D3 SERPL-MCNC: 55.8 NG/ML
ALBUMIN SERPL-MCNC: 4.3 G/DL (ref 3.4–5)
ALBUMIN/GLOB SERPL: 1.9 {RATIO} (ref 1.1–2.2)
ALP SERPL-CCNC: 73 U/L (ref 40–129)
ALT SERPL-CCNC: 41 U/L (ref 10–40)
ANION GAP SERPL CALCULATED.3IONS-SCNC: 10 MMOL/L (ref 3–16)
ANTI-THYROGLOB ABS: <15 IU/ML
AST SERPL-CCNC: 33 U/L (ref 15–37)
BILIRUB SERPL-MCNC: <0.2 MG/DL (ref 0–1)
BUN SERPL-MCNC: 9 MG/DL (ref 7–20)
CALCIUM SERPL-MCNC: 8.9 MG/DL (ref 8.3–10.6)
CHLORIDE SERPL-SCNC: 99 MMOL/L (ref 99–110)
CHOLEST SERPL-MCNC: 114 MG/DL (ref 0–199)
CO2 SERPL-SCNC: 27 MMOL/L (ref 21–32)
CREAT SERPL-MCNC: 0.7 MG/DL (ref 0.6–1.1)
EST. AVERAGE GLUCOSE BLD GHB EST-MCNC: 128.4 MG/DL
GFR SERPLBLD CREATININE-BSD FMLA CKD-EPI: >90 ML/MIN/{1.73_M2}
GLUCOSE SERPL-MCNC: 105 MG/DL (ref 70–99)
HBA1C MFR BLD: 6.1 %
HDLC SERPL-MCNC: 29 MG/DL (ref 40–60)
LDL CHOLESTEROL: 66 MG/DL
MAGNESIUM SERPL-MCNC: 1.61 MG/DL (ref 1.8–2.4)
PHOSPHATE SERPL-MCNC: 3.1 MG/DL (ref 2.5–4.9)
POTASSIUM SERPL-SCNC: 4.1 MMOL/L (ref 3.5–5.1)
PROT SERPL-MCNC: 6.6 G/DL (ref 6.4–8.2)
SODIUM SERPL-SCNC: 136 MMOL/L (ref 136–145)
T4 FREE SERPL-MCNC: 2.1 NG/DL (ref 0.9–1.8)
TRIGL SERPL-MCNC: 94 MG/DL (ref 0–150)
TSH SERPL DL<=0.005 MIU/L-ACNC: 0.12 UIU/ML (ref 0.27–4.2)
VLDLC SERPL CALC-MCNC: 19 MG/DL

## 2025-03-10 ENCOUNTER — OFFICE VISIT (OUTPATIENT)
Dept: ENDOCRINOLOGY | Age: 55
End: 2025-03-10
Payer: COMMERCIAL

## 2025-03-10 VITALS
SYSTOLIC BLOOD PRESSURE: 130 MMHG | BODY MASS INDEX: 47.29 KG/M2 | OXYGEN SATURATION: 98 % | HEART RATE: 88 BPM | WEIGHT: 257 LBS | HEIGHT: 62 IN | DIASTOLIC BLOOD PRESSURE: 92 MMHG | RESPIRATION RATE: 16 BRPM | TEMPERATURE: 98 F

## 2025-03-10 DIAGNOSIS — E66.01 CLASS 3 SEVERE OBESITY WITH SERIOUS COMORBIDITY AND BODY MASS INDEX (BMI) OF 45.0 TO 49.9 IN ADULT, UNSPECIFIED OBESITY TYPE: ICD-10-CM

## 2025-03-10 DIAGNOSIS — E66.813 CLASS 3 SEVERE OBESITY WITH SERIOUS COMORBIDITY AND BODY MASS INDEX (BMI) OF 45.0 TO 49.9 IN ADULT, UNSPECIFIED OBESITY TYPE: ICD-10-CM

## 2025-03-10 DIAGNOSIS — E28.319 PREMATURE MENOPAUSE: ICD-10-CM

## 2025-03-10 DIAGNOSIS — E06.3 HASHIMOTO'S THYROIDITIS: ICD-10-CM

## 2025-03-10 DIAGNOSIS — E89.2 POSTSURGICAL HYPOPARATHYROIDISM: ICD-10-CM

## 2025-03-10 DIAGNOSIS — E83.42 HYPOMAGNESEMIA: ICD-10-CM

## 2025-03-10 DIAGNOSIS — E78.2 MIXED HYPERLIPIDEMIA: ICD-10-CM

## 2025-03-10 DIAGNOSIS — E89.0 HYPOTHYROIDISM, POSTSURGICAL: ICD-10-CM

## 2025-03-10 DIAGNOSIS — C73 THYROID CANCER (HCC): ICD-10-CM

## 2025-03-10 DIAGNOSIS — M85.80 OSTEOPENIA, UNSPECIFIED LOCATION: ICD-10-CM

## 2025-03-10 DIAGNOSIS — R73.03 PREDIABETES: ICD-10-CM

## 2025-03-10 DIAGNOSIS — E21.0 HYPERPARATHYROIDISM, PRIMARY: Primary | ICD-10-CM

## 2025-03-10 PROCEDURE — 99214 OFFICE O/P EST MOD 30 MIN: CPT | Performed by: INTERNAL MEDICINE

## 2025-03-10 PROCEDURE — 3080F DIAST BP >= 90 MM HG: CPT | Performed by: INTERNAL MEDICINE

## 2025-03-10 PROCEDURE — 3075F SYST BP GE 130 - 139MM HG: CPT | Performed by: INTERNAL MEDICINE

## 2025-03-10 RX ORDER — LEVOTHYROXINE SODIUM 200 UG/1
TABLET ORAL
Qty: 90 TABLET | Refills: 1
Start: 2025-03-10

## 2025-03-10 RX ORDER — LEVOTHYROXINE SODIUM 25 UG/1
12.5 TABLET ORAL DAILY
Qty: 90 TABLET | Refills: 1
Start: 2025-03-10

## 2025-03-10 NOTE — PROGRESS NOTES
Levothyroxine to 0.200+0.0125 mg daily  Total thyroidectomy 10/16/2020  TSH 2.1-2.4-2.34-6.84-0.97-1.27-2.54-0.63-0.19-0.12  -TSH  -Free T4  -Free T3    8.  Postsurgical hypoparathyroidism  25-hydroxy vitamin D57.1  1000 mg Tums bid   Stopped Calcitriol, no symptoms  Vitamin D3 2000 IU   Magnesium 1.7,  low  Magnesium citrate 1 caps daily  PTH 5.9-11.4-10.5  Ionized calcium 1.21  Phosphorus 3.6  Calcium 9.8  -CMP  -PTH  -Ionized calcium  -Phosphorus  -Magnesium  -25 hydroxy vitamin D    9. Hypomagnesemia  Magnesium 250 mg only taking one because of diarrhea, will add another tablet   Magnesium in foods  Magnesium 1.8-1.7-1.5-1.7-1.8-1.6-1.6  -Magnesium    10.  Prediabetes  Glucose [phone]  HbA1C 6.2-6.3-6.1-6.2-6.3-6.1    11.  Hyperlipidemia  LDL -48-89-95-66  Triglycerides [phone]  HDL 47-61-46-30-29  PCP manages  On fenofibrate    See PCP for heart murmur    Reviewed and/or ordered clinical lab results Yes  Reviewed and/or ordered radiology tests Yes   Reviewed and/or ordered other diagnostic tests No  Discussed test results with performing physician No  Independently reviewed image, tracing, or specimen No  Made a decision to obtain old records No  Reviewed and summarized old records Yes   Calcium elevated since 2015, later upper limit of normal, in 2020 elevated again  PTH 38  Calcium 11.1-10.5-10.2-10.1-10.8-11.3, upper limit normal 10.6  TSH 1.4  RADIOPHARMACEUTICAL:  I-131 capsule 52.1 mCi PO            Obtained history from other than patient No    Mackenzie Salmon was counseled regarding symptoms of primary hyperparathyroidism, thyroid cancer, postsurgical hypothyroidism, postsurgical hypoparathyroidism, hypomagnesemia, premature menopause diagnosis, course and complications of disease if inadequately treated, side effects of medications, diagnosis, treatment options, and prognosis, risks, benefits, complications, and alternatives of treatment, labs, imaging and other studies

## 2025-03-19 LAB — THYROGLOB SERPL-MCNC: <0.5 NG/ML (ref 1.3–31.8)

## 2025-03-21 ENCOUNTER — TELEPHONE (OUTPATIENT)
Dept: ENDOCRINOLOGY | Age: 55
End: 2025-03-21

## 2025-04-15 DIAGNOSIS — F41.9 ANXIETY: ICD-10-CM

## 2025-04-15 NOTE — TELEPHONE ENCOUNTER
Medication:   Requested Prescriptions     Pending Prescriptions Disp Refills    sertraline (ZOLOFT) 50 MG tablet 90 tablet 1     Sig: Take 1 tablet by mouth daily     Last Filled:  10/31/2024    Last appt: 2/12/2025   Next appt: Visit date not found    Last OARRS:        No data to display

## 2025-04-15 NOTE — TELEPHONE ENCOUNTER
Call patient. I sent a prescription for a 30 day supply for sertraline to [facility] pharmacy. She needs to schedule appointment for hypertension, prediabetes, anxiety, GERD, and allergic rhinitis. I last saw her on 10/31/24.

## 2025-05-12 ENCOUNTER — TELEPHONE (OUTPATIENT)
Dept: PRIMARY CARE CLINIC | Age: 55
End: 2025-05-12

## 2025-05-12 NOTE — PROGRESS NOTES
ENCOUNTER DATE: 5/27/2025     NAME: Mackenzie Salmon   AGE: 54 y.o.   GENDER: female   YOB: 1970    Chief Complaint   Patient presents with    Follow-up Chronic Condition       ASSESSMENT/PLAN:  1. Essential hypertension  Stable on current regimen.  Refills sent  Recent labs reviewed  - lisinopril-hydroCHLOROthiazide (PRINZIDE;ZESTORETIC) 10-12.5 MG per tablet; Take 1 tablet by mouth daily  Dispense: 90 tablet; Refill: 1    2. Mixed hyperlipidemia  Recent labs reviewed.  Cholesterol at goal.  Continue current regimen  - fenofibrate (TRICOR) 145 MG tablet; Take 1 tablet by mouth daily  Dispense: 90 tablet; Refill: 1    3. Anxiety  Stable  - sertraline (ZOLOFT) 50 MG tablet; Take 1 tablet by mouth daily  Dispense: 90 tablet; Refill: 1    4.  GERD  Continue with omeprazole 20 mg daily OTC      Return in about 6 months (around 11/27/2025) for chronic visit.     HPI:   Patient is here for a chronic visit and med refill    HYPERTENSION  On lisinopril/HCTZ 10/12.5 mg daily  Stable.     ANXIETY  On sertraline 50 mg daily  Doing well on current dose    LIPIDS  On fenofibrate 145 mg daily  Last LDL 95  Cholesterol well controlled.     GERD  On omeprazole 20 mg daily  Takes otc    ENDO  Follows for thyroid management.   Labs completed 3/2025    ROS:  Review of Systems   Constitutional:  Negative for chills, diaphoresis, fatigue and fever.   Respiratory:  Negative for cough, shortness of breath and wheezing.    Cardiovascular:  Negative for chest pain.   Gastrointestinal:  Negative for abdominal distention, abdominal pain, blood in stool, constipation, diarrhea, nausea, rectal pain and vomiting.   Musculoskeletal:  Negative for myalgias.   Skin:  Negative for rash.   Neurological:  Negative for dizziness, light-headedness and headaches.   Hematological:  Negative for adenopathy.   Psychiatric/Behavioral:  Negative for dysphoric mood and sleep disturbance. The patient is not nervous/anxious.         VITALS:  BP

## 2025-05-12 NOTE — TELEPHONE ENCOUNTER
----- Message from Angel R sent at 5/12/2025  3:48 PM EDT -----  Regarding: [facility] Appointment Request  [facility] Appointment Request    Patient needs appointment for [facility] Appointment Type: Pre-Op Visit.    Patient Requested Dates(s): May 27, 2025  Patient Requested Time: 3:30 pm or 4:00 pm  Provider Name: Mark Hernandez MD or Pepper Haque, TROY - CNP    Reason for Appointment Request: Established Patient - Available appointments did not meet patient need    Patient is calling to schedule an appointment to be seen before her June 26, 2025 surgery. She mentioned that she has the appointment on May 27, 2025 for medication check and she was wondering if it's possible for her to have her Pre-Operational visit on that day as well. She'll be having a [facility] on thumb on the day of her surgery.  --------------------------------------------------------------------------------------------------------------------------    Relationship to Patient: Self     Call Back Information: OK to leave message on voicemail  Preferred Call Back Number: Phone [phone]

## 2025-05-27 ENCOUNTER — OFFICE VISIT (OUTPATIENT)
Dept: PRIMARY CARE CLINIC | Age: 55
End: 2025-05-27
Payer: COMMERCIAL

## 2025-05-27 VITALS
DIASTOLIC BLOOD PRESSURE: 80 MMHG | SYSTOLIC BLOOD PRESSURE: 130 MMHG | BODY MASS INDEX: 47.36 KG/M2 | OXYGEN SATURATION: 97 % | TEMPERATURE: 97.7 F | HEART RATE: 94 BPM | WEIGHT: 259 LBS

## 2025-05-27 DIAGNOSIS — F41.9 ANXIETY: ICD-10-CM

## 2025-05-27 DIAGNOSIS — I10 ESSENTIAL HYPERTENSION: Primary | ICD-10-CM

## 2025-05-27 DIAGNOSIS — K21.9 GASTROESOPHAGEAL REFLUX DISEASE, UNSPECIFIED WHETHER ESOPHAGITIS PRESENT: ICD-10-CM

## 2025-05-27 DIAGNOSIS — E78.2 MIXED HYPERLIPIDEMIA: ICD-10-CM

## 2025-05-27 PROCEDURE — 99214 OFFICE O/P EST MOD 30 MIN: CPT | Performed by: NURSE PRACTITIONER

## 2025-05-27 PROCEDURE — 3079F DIAST BP 80-89 MM HG: CPT | Performed by: NURSE PRACTITIONER

## 2025-05-27 PROCEDURE — 3075F SYST BP GE 130 - 139MM HG: CPT | Performed by: NURSE PRACTITIONER

## 2025-05-27 RX ORDER — LISINOPRIL AND HYDROCHLOROTHIAZIDE 10; 12.5 MG/1; MG/1
1 TABLET ORAL DAILY
Qty: 90 TABLET | Refills: 1 | Status: SHIPPED | OUTPATIENT
Start: 2025-05-27

## 2025-05-27 RX ORDER — FENOFIBRATE 145 MG/1
145 TABLET, FILM COATED ORAL DAILY
Qty: 90 TABLET | Refills: 1 | Status: SHIPPED | OUTPATIENT
Start: 2025-05-27 | End: 2025-05-27

## 2025-05-27 RX ORDER — FENOFIBRATE 145 MG/1
145 TABLET, FILM COATED ORAL DAILY
Qty: 90 TABLET | Refills: 1 | Status: SHIPPED | OUTPATIENT
Start: 2025-05-27

## 2025-05-27 SDOH — ECONOMIC STABILITY: FOOD INSECURITY: WITHIN THE PAST 12 MONTHS, YOU WORRIED THAT YOUR FOOD WOULD RUN OUT BEFORE YOU GOT MONEY TO BUY MORE.: NEVER TRUE

## 2025-05-27 SDOH — ECONOMIC STABILITY: FOOD INSECURITY: WITHIN THE PAST 12 MONTHS, THE FOOD YOU BOUGHT JUST DIDN'T LAST AND YOU DIDN'T HAVE MONEY TO GET MORE.: NEVER TRUE

## 2025-05-27 ASSESSMENT — PATIENT HEALTH QUESTIONNAIRE - PHQ9
SUM OF ALL RESPONSES TO PHQ QUESTIONS 1-9: 0
SUM OF ALL RESPONSES TO PHQ QUESTIONS 1-9: 0
1. LITTLE INTEREST OR PLEASURE IN DOING THINGS: NOT AT ALL
2. FEELING DOWN, DEPRESSED OR HOPELESS: NOT AT ALL
SUM OF ALL RESPONSES TO PHQ QUESTIONS 1-9: 0
SUM OF ALL RESPONSES TO PHQ QUESTIONS 1-9: 0

## 2025-05-27 ASSESSMENT — ENCOUNTER SYMPTOMS
CONSTIPATION: 0
BLOOD IN STOOL: 0
WHEEZING: 0
ABDOMINAL DISTENTION: 0
DIARRHEA: 0
COUGH: 0
SHORTNESS OF BREATH: 0
RECTAL PAIN: 0
NAUSEA: 0
ABDOMINAL PAIN: 0
VOMITING: 0

## 2025-06-18 DIAGNOSIS — E28.319 PREMATURE MENOPAUSE: ICD-10-CM

## 2025-06-18 DIAGNOSIS — E89.2 POSTSURGICAL HYPOPARATHYROIDISM: ICD-10-CM

## 2025-06-18 DIAGNOSIS — E83.42 HYPOMAGNESEMIA: ICD-10-CM

## 2025-06-18 DIAGNOSIS — C73 THYROID CANCER (HCC): ICD-10-CM

## 2025-06-18 DIAGNOSIS — E21.0 HYPERPARATHYROIDISM, PRIMARY: ICD-10-CM

## 2025-06-18 DIAGNOSIS — E78.2 MIXED HYPERLIPIDEMIA: ICD-10-CM

## 2025-06-18 DIAGNOSIS — M85.80 OSTEOPENIA, UNSPECIFIED LOCATION: ICD-10-CM

## 2025-06-18 DIAGNOSIS — R73.03 PREDIABETES: ICD-10-CM

## 2025-06-18 DIAGNOSIS — E06.3 HASHIMOTO'S THYROIDITIS: ICD-10-CM

## 2025-06-18 DIAGNOSIS — E89.0 HYPOTHYROIDISM, POSTSURGICAL: ICD-10-CM

## 2025-06-18 LAB
25(OH)D3 SERPL-MCNC: 46.5 NG/ML
ALBUMIN SERPL-MCNC: 4.3 G/DL (ref 3.4–5)
ALBUMIN/GLOB SERPL: 1.8 {RATIO} (ref 1.1–2.2)
ALP SERPL-CCNC: 78 U/L (ref 40–129)
ALT SERPL-CCNC: 36 U/L (ref 10–40)
ANION GAP SERPL CALCULATED.3IONS-SCNC: 10 MMOL/L (ref 3–16)
AST SERPL-CCNC: 38 U/L (ref 15–37)
BILIRUB SERPL-MCNC: <0.2 MG/DL (ref 0–1)
BUN SERPL-MCNC: 11 MG/DL (ref 7–20)
CALCIUM SERPL-MCNC: 9.4 MG/DL (ref 8.3–10.6)
CHLORIDE SERPL-SCNC: 104 MMOL/L (ref 99–110)
CO2 SERPL-SCNC: 26 MMOL/L (ref 21–32)
CREAT SERPL-MCNC: 0.7 MG/DL (ref 0.6–1.1)
GFR SERPLBLD CREATININE-BSD FMLA CKD-EPI: >90 ML/MIN/{1.73_M2}
GLUCOSE SERPL-MCNC: 99 MG/DL (ref 70–99)
MAGNESIUM SERPL-MCNC: 1.63 MG/DL (ref 1.8–2.4)
PHOSPHATE SERPL-MCNC: 3.2 MG/DL (ref 2.5–4.9)
POTASSIUM SERPL-SCNC: 4.1 MMOL/L (ref 3.5–5.1)
PROT SERPL-MCNC: 6.7 G/DL (ref 6.4–8.2)
PTH-INTACT SERPL-MCNC: 16.7 PG/ML (ref 14–72)
SODIUM SERPL-SCNC: 140 MMOL/L (ref 136–145)
T4 FREE SERPL-MCNC: 2.3 NG/DL (ref 0.9–1.8)
THYROGLOB AB SERPL IA-ACNC: <15 IU/ML
TSH SERPL DL<=0.005 MIU/L-ACNC: 0.17 UIU/ML (ref 0.27–4.2)

## 2025-06-20 ENCOUNTER — OFFICE VISIT (OUTPATIENT)
Dept: PRIMARY CARE CLINIC | Age: 55
End: 2025-06-20

## 2025-06-20 VITALS
OXYGEN SATURATION: 98 % | HEART RATE: 79 BPM | DIASTOLIC BLOOD PRESSURE: 80 MMHG | WEIGHT: 262 LBS | TEMPERATURE: 96.8 F | BODY MASS INDEX: 48.21 KG/M2 | RESPIRATION RATE: 16 BRPM | SYSTOLIC BLOOD PRESSURE: 110 MMHG | HEIGHT: 62 IN

## 2025-06-20 DIAGNOSIS — J30.9 ALLERGIC RHINITIS, UNSPECIFIED SEASONALITY, UNSPECIFIED TRIGGER: ICD-10-CM

## 2025-06-20 DIAGNOSIS — E89.2 POSTSURGICAL HYPOPARATHYROIDISM: ICD-10-CM

## 2025-06-20 DIAGNOSIS — I10 ESSENTIAL HYPERTENSION: ICD-10-CM

## 2025-06-20 DIAGNOSIS — R73.03 PREDIABETES: ICD-10-CM

## 2025-06-20 DIAGNOSIS — F41.9 ANXIETY: ICD-10-CM

## 2025-06-20 DIAGNOSIS — G47.33 OSA (OBSTRUCTIVE SLEEP APNEA): ICD-10-CM

## 2025-06-20 DIAGNOSIS — E78.2 MIXED HYPERLIPIDEMIA: ICD-10-CM

## 2025-06-20 DIAGNOSIS — M18.12 PRIMARY OSTEOARTHRITIS OF FIRST CARPOMETACARPAL JOINT OF LEFT HAND: Primary | ICD-10-CM

## 2025-06-20 DIAGNOSIS — Z01.818 PRE-OP EVALUATION: ICD-10-CM

## 2025-06-20 DIAGNOSIS — E83.42 HYPOMAGNESEMIA: ICD-10-CM

## 2025-06-20 DIAGNOSIS — K21.9 GASTROESOPHAGEAL REFLUX DISEASE, UNSPECIFIED WHETHER ESOPHAGITIS PRESENT: ICD-10-CM

## 2025-06-20 DIAGNOSIS — E89.0 POSTSURGICAL HYPOTHYROIDISM: ICD-10-CM

## 2025-06-20 LAB
CA-I ADJ PH7.4 SERPL-SCNC: 1.26 MMOL/L (ref 1.09–1.3)
CA-I SERPL ISE-SCNC: 1.25 MMOL/L (ref 1.09–1.3)

## 2025-06-20 SDOH — ECONOMIC STABILITY: FOOD INSECURITY: WITHIN THE PAST 12 MONTHS, THE FOOD YOU BOUGHT JUST DIDN'T LAST AND YOU DIDN'T HAVE MONEY TO GET MORE.: NEVER TRUE

## 2025-06-20 SDOH — ECONOMIC STABILITY: FOOD INSECURITY: WITHIN THE PAST 12 MONTHS, YOU WORRIED THAT YOUR FOOD WOULD RUN OUT BEFORE YOU GOT MONEY TO BUY MORE.: NEVER TRUE

## 2025-06-20 ASSESSMENT — ENCOUNTER SYMPTOMS
CHEST TIGHTNESS: 0
SORE THROAT: 0
SINUS PRESSURE: 0
RHINORRHEA: 0
ABDOMINAL PAIN: 0
NAUSEA: 0
SHORTNESS OF BREATH: 0
BLOOD IN STOOL: 0
VOMITING: 0
DIARRHEA: 0
TROUBLE SWALLOWING: 0
WHEEZING: 0
COUGH: 0

## 2025-06-20 ASSESSMENT — PATIENT HEALTH QUESTIONNAIRE - PHQ9
1. LITTLE INTEREST OR PLEASURE IN DOING THINGS: NOT AT ALL
SUM OF ALL RESPONSES TO PHQ QUESTIONS 1-9: 0
SUM OF ALL RESPONSES TO PHQ QUESTIONS 1-9: 0
2. FEELING DOWN, DEPRESSED OR HOPELESS: NOT AT ALL
SUM OF ALL RESPONSES TO PHQ QUESTIONS 1-9: 0
SUM OF ALL RESPONSES TO PHQ QUESTIONS 1-9: 0

## 2025-06-20 NOTE — PROGRESS NOTES
Date of Visit: 2025    Mackenzie Salmon (:  1970) is a 54 y.o. female,  Established patient here for evaluation of the following chief complaint(s):  Pre-op Exam (Left 1st CMC arthroplasty with tended tranfers/[facility] /Dr Cristobal Chen M.D./[phone]/2025)      ASSESSMENT/PLAN:    Assessment & Plan  1. Primary osteoarthritis of first carpometacarpal joint of left hand  -Preoperative history and physical done  -Patient is in satisfactory condition for a Left first CMC arthroplasty with tendon transfer to be done on 2025 by Dr. Cristobal Chen    2. Preoperative evaluation  - Preoperative history and physical done  - Patient is in satisfactory condition for a Left first CMC arthroplasty with tendon transfer to be done on 2025 by Dr. Cristobal Chen  - EKG performed today is normal  - Advised to avoid anti-inflammatori, aspirin, Advil, and fish oil for 7 days prior to surgery  - Tylenol 650 mg can be taken three times daily if needed for pain management  - Abstain from eating or drinking after midnight before the surgery    3. Essential Hypertension  - stable  - continue lisinopril-HCTZ 10-12.5 mg once daily  - low sodium diet  - regular aerobic exercise    4. Mixed Hyperlipidemia  - stable  - Continue fenofibrate 145 mg once daily  - low cholesterol, low fat diet  - regular aerobic exercise    5. Anxiety  - Stable  - Continue sertraline 50 mg once daily    6. Gastroesophageal reflux disease (GERD)  - Stable  - Continue omeprazole 20 mg once daily  - Decrease caffeine, avoid spicy foods, avoid tomato based foods  - Eat small meals instead of large meals  - Wait 2-3 hours after eating before lying down    7. Allergic rhinitis  - stable  - Continue Flonase nasal spray 2 sprays each nostril once daily as needed    8. Prediabetes  - stable  - low carbohydrate diet  - regular aerobic exercise    9. Postsurgical hypothyroidism  - stable  - continue levothyroxine 200 mcg once daily  - continue

## 2025-06-21 LAB — THYROGLOB SERPL-MCNC: <0.5 NG/ML (ref 1.3–31.8)

## 2025-06-22 PROBLEM — M18.12 PRIMARY OSTEOARTHRITIS OF FIRST CARPOMETACARPAL JOINT OF LEFT HAND: Status: ACTIVE | Noted: 2025-06-22

## 2025-06-22 PROBLEM — Z01.818 PRE-OP EVALUATION: Status: ACTIVE | Noted: 2025-06-22

## 2025-06-24 ENCOUNTER — OFFICE VISIT (OUTPATIENT)
Dept: ENDOCRINOLOGY | Age: 55
End: 2025-06-24
Payer: COMMERCIAL

## 2025-06-24 VITALS
WEIGHT: 258 LBS | RESPIRATION RATE: 14 BRPM | SYSTOLIC BLOOD PRESSURE: 117 MMHG | DIASTOLIC BLOOD PRESSURE: 82 MMHG | BODY MASS INDEX: 47.19 KG/M2 | TEMPERATURE: 98 F | HEART RATE: 81 BPM | OXYGEN SATURATION: 97 %

## 2025-06-24 DIAGNOSIS — E28.319 PREMATURE MENOPAUSE: ICD-10-CM

## 2025-06-24 DIAGNOSIS — E66.813 CLASS 3 SEVERE OBESITY WITH SERIOUS COMORBIDITY AND BODY MASS INDEX (BMI) OF 45.0 TO 49.9 IN ADULT, UNSPECIFIED OBESITY TYPE (HCC): ICD-10-CM

## 2025-06-24 DIAGNOSIS — E06.3 HASHIMOTO'S THYROIDITIS: ICD-10-CM

## 2025-06-24 DIAGNOSIS — M85.80 OSTEOPENIA, UNSPECIFIED LOCATION: ICD-10-CM

## 2025-06-24 DIAGNOSIS — E89.0 HYPOTHYROIDISM, POSTSURGICAL: ICD-10-CM

## 2025-06-24 DIAGNOSIS — C73 THYROID CANCER (HCC): ICD-10-CM

## 2025-06-24 DIAGNOSIS — E78.2 MIXED HYPERLIPIDEMIA: ICD-10-CM

## 2025-06-24 DIAGNOSIS — E83.42 HYPOMAGNESEMIA: ICD-10-CM

## 2025-06-24 DIAGNOSIS — E89.2 POSTSURGICAL HYPOPARATHYROIDISM: ICD-10-CM

## 2025-06-24 DIAGNOSIS — E21.0 HYPERPARATHYROIDISM, PRIMARY: Primary | ICD-10-CM

## 2025-06-24 DIAGNOSIS — R73.03 PREDIABETES: ICD-10-CM

## 2025-06-24 PROBLEM — E66.9 OBESITY, UNSPECIFIED: Status: ACTIVE | Noted: 2025-06-24

## 2025-06-24 PROCEDURE — 3079F DIAST BP 80-89 MM HG: CPT | Performed by: INTERNAL MEDICINE

## 2025-06-24 PROCEDURE — 3074F SYST BP LT 130 MM HG: CPT | Performed by: INTERNAL MEDICINE

## 2025-06-24 PROCEDURE — 99214 OFFICE O/P EST MOD 30 MIN: CPT | Performed by: INTERNAL MEDICINE

## 2025-06-24 RX ORDER — LEVOTHYROXINE SODIUM 200 UG/1
200 TABLET ORAL DAILY
Qty: 90 TABLET | Refills: 1 | Status: SHIPPED | OUTPATIENT
Start: 2025-06-24

## 2025-06-24 NOTE — PROGRESS NOTES
D  Counseled patient on risk fracture  7/31/2020  10 year risk for major osteoporotic fracture is 3.8% and risk of a hip    fracture is 0.2%.      7.  Postsurgical hypothyroidism  Consider to decrease the dose if TSH decreases further  Decrease Levothyroxine to 0.200 mg daily  Total thyroidectomy 10/16/2020  TSH 2.1-2.4-2.34-6.84-0.97-1.27-2.54-0.63-0.19-0.12-0.17  -TSH  -Free T4  -Free T3    8.  Postsurgical hypoparathyroidism  Improved.  25-hydroxy vitamin D57.1  1000 mg Tums bid   Stopped Calcitriol, no symptoms  Vitamin D3 2000 IU   Magnesium 1.7,  low  Magnesium citrate 1 caps daily  PTH 5.9-11.4-10.5  Ionized calcium 1.21  Phosphorus 3.6  Calcium 9.8  -CMP  -PTH  -Ionized calcium  -Phosphorus  -Magnesium  -25 hydroxy vitamin D    9. Hypomagnesemia  Magnesium 250 mg only taking one because of diarrhea, will add another tablet   Magnesium in foods  Magnesium 1.8-1.7-1.5-1.7-1.8-1.6-1.6-1.63  -Magnesium    10.  Prediabetes  Glucose [phone]  HbA1C 6.2-6.3-6.1-6.2-6.3-6.1    11.  Hyperlipidemia  LDL -19-89-95-66  Triglycerides [phone]  HDL 75-71-84-30-29  PCP manages  On fenofibrate    See PCP for heart murmur    Reviewed and/or ordered clinical lab results Yes  Reviewed and/or ordered radiology tests Yes   Reviewed and/or ordered other diagnostic tests No  Discussed test results with performing physician No  Independently reviewed image, tracing, or specimen No  Made a decision to obtain old records No  Reviewed and summarized old records Yes   Calcium elevated since 2015, later upper limit of normal, in 2020 elevated again  PTH 38  Calcium 11.1-10.5-10.2-10.1-10.8-11.3, upper limit normal 10.6  TSH 1.4  RADIOPHARMACEUTICAL:  I-131 capsule 52.1 mCi PO            Obtained history from other than patient No    Mackenzie Salmon was counseled regarding symptoms of primary hyperparathyroidism, thyroid cancer, postsurgical hypothyroidism, postsurgical hypoparathyroidism, hypomagnesemia,

## (undated) DEVICE — DRESSING PETRO W3XL3IN OIL EMUL N ADH GZ KNIT IMPREG CELOS

## (undated) DEVICE — MEDICINE CUP, GRADUATED, STER: Brand: MEDLINE

## (undated) DEVICE — TRAY, SKIN SCRUB,GEL,LATEX FRE: Brand: MEDLINE INDUSTRIES, INC.

## (undated) DEVICE — TRAY PREP DRY W/ PREM GLV 2 APPL 6 SPNG 2 UNDPD 1 OVERWRAP

## (undated) DEVICE — BANDAGE COMPR W6INXL10YD ST M E WHITE/BEIGE

## (undated) DEVICE — DRAPE,U/ SHT,SPLIT,PLAS,STERIL: Brand: MEDLINE

## (undated) DEVICE — GLOVE SURG SZ 75 L12IN FNGR THK134MIL BRN LTX BEAD CUF ORTH

## (undated) DEVICE — SOLUTION IV IRRIG 500ML 0.9% SODIUM CHL 2F7123

## (undated) DEVICE — ROOM TURNOVER KIT W/ ARM STRP

## (undated) DEVICE — SUTURE 2-0 VCRL CTD FS-1 J443H

## (undated) DEVICE — SUTURE NDL MAYO CATGUT 824S5

## (undated) DEVICE — SYRINGE, LUER LOCK, 10ML: Brand: MEDLINE

## (undated) DEVICE — Z CONVERTED USE 2273164 BANDAGE COMPR W4INXL4 1/2YD E EC SGL LAYERED CLP CLSR ECONO

## (undated) DEVICE — T-DRAPE,EXTREMITY,STERILE: Brand: MEDLINE

## (undated) DEVICE — SUTURE VCRL SZ 3-0 L27IN ABSRB UD L26MM SH 1/2 CIR J416H

## (undated) DEVICE — TOWEL,OR,DSP,ST,BLUE,STD,4/PK,20PK/CS: Brand: MEDLINE

## (undated) DEVICE — Z DISCONTINUED USE 2275686 GLOVE SURG SZ 8 L12IN FNGR THK13MIL WHT ISOLEX POLYISOPRENE

## (undated) DEVICE — PACK PROCEDURE SURG EXTREMITY MFFOP CUST

## (undated) DEVICE — BANDAGE,GAUZE,BULKEE II,4.5"X4.1YD,STRL: Brand: MEDLINE

## (undated) DEVICE — ELECTRODE PT RET AD L9FT HI MOIST COND ADH HYDRGEL CORDED

## (undated) DEVICE — STAPLER SKIN H3.9MM WIRE DIA0.58MM CRWN 6.9MM 35 STPL ROT

## (undated) DEVICE — ESMARK: Brand: DEROYAL

## (undated) DEVICE — STOCKINETTE,IMPERVIOUS,12X48,STERILE: Brand: MEDLINE

## (undated) DEVICE — PADDING CAST W3INXL4YD COT BLEND MIC PLEAT UNDERCAST SPEC

## (undated) DEVICE — PADDING CAST W4INXL4YD ST COT RAYON MICROPLEATED HIGHLY

## (undated) DEVICE — GAUZE,SPONGE,4"X4",8PLY,STRL,LF,10/TRAY: Brand: MEDLINE

## (undated) DEVICE — DRAPE CARM MINI FOR IMAG SYS INSIGHT FLROSCN

## (undated) DEVICE — HYPODERMIC SAFETY NEEDLE: Brand: MAGELLAN

## (undated) DEVICE — 2108 SERIES SAGITTAL BLADE FAN, OFFSET  (29.0 X 0.89 X 73.0MM)

## (undated) DEVICE — RASP SURG L W0.27XL0.55IN TEAR CRSS CUT MIC RECIP SAW

## (undated) DEVICE — GOWN,AURORA,NONREINF,RAGLAN,XXL,STERILE: Brand: MEDLINE

## (undated) DEVICE — INTENDED FOR TISSUE SEPARATION, AND OTHER PROCEDURES THAT REQUIRE A SHARP SURGICAL BLADE TO PUNCTURE OR CUT.: Brand: BARD-PARKER ® STAINLESS STEEL BLADES